# Patient Record
Sex: FEMALE | Race: WHITE | Employment: FULL TIME | URBAN - METROPOLITAN AREA
[De-identification: names, ages, dates, MRNs, and addresses within clinical notes are randomized per-mention and may not be internally consistent; named-entity substitution may affect disease eponyms.]

---

## 2017-02-20 ENCOUNTER — TELEPHONE (OUTPATIENT)
Dept: PULMONOLOGY | Facility: CLINIC | Age: 47
End: 2017-02-20

## 2017-03-01 ENCOUNTER — HOSPITAL ENCOUNTER (OUTPATIENT)
Age: 47
Discharge: HOME | End: 2017-03-01
Payer: COMMERCIAL

## 2017-03-01 VITALS
TEMPERATURE: 97 F | HEART RATE: 89 BPM | OXYGEN SATURATION: 100 % | WEIGHT: 190 LBS | BODY MASS INDEX: 31 KG/M2 | RESPIRATION RATE: 18 BRPM | SYSTOLIC BLOOD PRESSURE: 122 MMHG | DIASTOLIC BLOOD PRESSURE: 87 MMHG

## 2017-03-01 DIAGNOSIS — J01.10 ACUTE NON-RECURRENT FRONTAL SINUSITIS: Primary | ICD-10-CM

## 2017-03-01 PROCEDURE — 99214 OFFICE O/P EST MOD 30 MIN: CPT

## 2017-03-01 PROCEDURE — 99213 OFFICE O/P EST LOW 20 MIN: CPT

## 2017-03-01 RX ORDER — AMOXICILLIN AND CLAVULANATE POTASSIUM 875; 125 MG/1; MG/1
1 TABLET, FILM COATED ORAL 2 TIMES DAILY
Qty: 20 TABLET | Refills: 0 | Status: SHIPPED | OUTPATIENT
Start: 2017-03-01 | End: 2017-03-11

## 2017-03-01 RX ORDER — FLUTICASONE PROPIONATE 50 MCG
2 SPRAY, SUSPENSION (ML) NASAL DAILY
Qty: 16 G | Refills: 0 | Status: SHIPPED | OUTPATIENT
Start: 2017-03-01 | End: 2017-03-31

## 2017-03-01 NOTE — ED INITIAL ASSESSMENT (HPI)
C/o coughing with nasal congestion and facial pressure for 7 days  Used Dimetapp with no relief Denies fever

## 2017-03-01 NOTE — ED PROVIDER NOTES
Patient presents with:  Cough/URI      HPI:     Wai Angel is a 40year old female who presents with a chief complaint of sinus congestion and discomfort for the past week.  Thick purulent drainage from the nose with ear pressure and frontal sinus p SpO2 100%  LMP 01/01/2017 (Approximate)  GENERAL: well developed, well nourished, well hydrated, no distress  SKIN: good skin turgor, no obvious rashes  NECK: supple, no adenopathy  CARDIO: RRR without murmur  EXTREMITIES: no cyanosis or edema.  BENOIT without

## 2017-03-31 ENCOUNTER — HOSPITAL ENCOUNTER (OUTPATIENT)
Dept: CT IMAGING | Facility: HOSPITAL | Age: 47
Discharge: HOME | End: 2017-03-31
Attending: INTERNAL MEDICINE
Payer: COMMERCIAL

## 2017-03-31 DIAGNOSIS — R91.1 LUNG NODULE: ICD-10-CM

## 2017-03-31 PROCEDURE — 82565 ASSAY OF CREATININE: CPT

## 2017-03-31 PROCEDURE — 71260 CT THORAX DX C+: CPT

## 2017-04-17 ENCOUNTER — TELEPHONE (OUTPATIENT)
Dept: PULMONOLOGY | Facility: CLINIC | Age: 47
End: 2017-04-17

## 2017-04-17 DIAGNOSIS — R91.8 PULMONARY NODULES: Primary | ICD-10-CM

## 2017-04-18 NOTE — TELEPHONE ENCOUNTER
Left msg for pt stating that Dr. Terrence Diane will review results on Thursday when he returns to the clinic, we will f/u thereafter, & to contact our office at #[phone] if she has any questions.

## 2017-04-18 NOTE — TELEPHONE ENCOUNTER
Pt checking status on [employer]. Pls call - aware [address] [facility] is not back until Thurday, 4/20/17. Thank you.

## 2017-04-20 NOTE — TELEPHONE ENCOUNTER
I attempted to call the patient but she could not be reached. I saw her  CT chest and it appears that 1 of her nodules may potentially be 1 or 2 mm larger in size compared to last year.   It does state however in the  report that this may just be due to [facility]

## 2017-05-16 ENCOUNTER — HOSPITAL ENCOUNTER (OUTPATIENT)
Age: 47
Discharge: HOME | End: 2017-05-16
Attending: EMERGENCY MEDICINE
Payer: COMMERCIAL

## 2017-05-16 VITALS
DIASTOLIC BLOOD PRESSURE: 88 MMHG | TEMPERATURE: 99 F | RESPIRATION RATE: 16 BRPM | OXYGEN SATURATION: 95 % | HEART RATE: 101 BPM | BODY MASS INDEX: 31.02 KG/M2 | SYSTOLIC BLOOD PRESSURE: 124 MMHG | WEIGHT: 193 LBS | HEIGHT: 66 IN

## 2017-05-16 DIAGNOSIS — J40 BRONCHITIS: ICD-10-CM

## 2017-05-16 DIAGNOSIS — J01.00 ACUTE NON-RECURRENT MAXILLARY SINUSITIS: Primary | ICD-10-CM

## 2017-05-16 PROCEDURE — 87430 STREP A AG IA: CPT

## 2017-05-16 PROCEDURE — 99214 OFFICE O/P EST MOD 30 MIN: CPT

## 2017-05-16 PROCEDURE — 99213 OFFICE O/P EST LOW 20 MIN: CPT

## 2017-05-16 RX ORDER — AMOXICILLIN 875 MG/1
875 TABLET, COATED ORAL 2 TIMES DAILY
Qty: 20 TABLET | Refills: 0 | Status: SHIPPED | OUTPATIENT
Start: 2017-05-16 | End: 2017-05-26

## 2017-05-16 NOTE — ED PROVIDER NOTES
Patient Seen in: [facility] Immediate Care In [address] [place] [address]    History   Patient presents with:  Cough/URI    Stated Complaint: Sneezing/Cough    HPI    The patient is a 32-year-old female with past history of Hodgkin's lymphoma in the distant past who 05/16/17 1414 101   Resp 05/16/17 1414 16   Temp 05/16/17 1414 98.8 °F (37.1 °C)   Temp src 05/16/17 1414 Oral   SpO2 05/16/17 1414 95 %   O2 Device 05/16/17 1414 None (Room air)       Current:/88 mmHg  Pulse 101  Temp(Src) 98.8 °F (37.1 °C) (Oral)

## 2017-05-16 NOTE — ED NOTES
Increase po fluids rest motrin for pain and fever.  Wash hands finish po meds follow u p with pcp in 3 days go to the ed for new or worse concerns

## 2017-05-16 NOTE — ED INITIAL ASSESSMENT (HPI)
Fever low grade cough cold congestion sore throat for over one week. Yellow sputum with cough.  Using otc products but not feeling better

## 2017-06-20 ENCOUNTER — OFFICE VISIT (OUTPATIENT)
Dept: INTERNAL MEDICINE CLINIC | Facility: CLINIC | Age: 47
End: 2017-06-20

## 2017-06-20 VITALS
TEMPERATURE: 98 F | BODY MASS INDEX: 30.75 KG/M2 | OXYGEN SATURATION: 97 % | HEIGHT: 66.5 IN | HEART RATE: 96 BPM | WEIGHT: 193.63 LBS | SYSTOLIC BLOOD PRESSURE: 114 MMHG | DIASTOLIC BLOOD PRESSURE: 88 MMHG

## 2017-06-20 DIAGNOSIS — Z00.00 ROUTINE GENERAL MEDICAL EXAMINATION AT A HEALTH CARE FACILITY: Primary | ICD-10-CM

## 2017-06-20 DIAGNOSIS — R92.2 DENSE BREAST: ICD-10-CM

## 2017-06-20 DIAGNOSIS — Z12.31 VISIT FOR SCREENING MAMMOGRAM: ICD-10-CM

## 2017-06-20 DIAGNOSIS — R60.0 LOCALIZED EDEMA: ICD-10-CM

## 2017-06-20 DIAGNOSIS — Z85.72 HISTORY OF NON-HODGKIN'S LYMPHOMA: ICD-10-CM

## 2017-06-20 PROCEDURE — 99396 PREV VISIT EST AGE 40-64: CPT | Performed by: INTERNAL MEDICINE

## 2017-06-26 NOTE — PROGRESS NOTES
HPI:    Patient ID: Shayna Cerda is a 40year old female.     HPI  Physical exam    Ankle swellin goof and on  Generally health  Hx of Hodking Lymphoma  In remission  /88   Pulse 96   Temp 98.4 °F (36.9 °C) (Oral)   Ht 5' 6.5\" (1.689 m)   Wt 19 Surgical History:  No date: REMOVAL OF OVARY(S)      Comment: 2011 Left   Family History   Problem Relation Age of Onset   • Heart Disorder Mother 80      Social History: Smoking status: Former Smoker ASSAY, THYROID STIM HORMONE, FREE T4 (FREE         THYROXINE), LIPID PANEL, CBC, PLATELET; NO         DIFFERENTIAL, HEMOGLOBIN Q4A, COMP METABOLIC         PANEL (14), URINALYSIS, ROUTINE, FOLIC ACID         SERUM(FOLATE), VITAMIN D, 25-HYDROXY, VITAMIN

## 2017-07-07 ENCOUNTER — APPOINTMENT (OUTPATIENT)
Dept: LAB | Facility: HOSPITAL | Age: 47
End: 2017-07-07
Attending: INTERNAL MEDICINE
Payer: COMMERCIAL

## 2017-07-07 ENCOUNTER — HOSPITAL ENCOUNTER (OUTPATIENT)
Dept: CV DIAGNOSTICS | Facility: HOSPITAL | Age: 47
Discharge: HOME | End: 2017-07-07
Attending: INTERNAL MEDICINE
Payer: COMMERCIAL

## 2017-07-07 DIAGNOSIS — R60.0 LOCALIZED EDEMA: ICD-10-CM

## 2017-07-07 DIAGNOSIS — Z00.00 ROUTINE GENERAL MEDICAL EXAMINATION AT A HEALTH CARE FACILITY: ICD-10-CM

## 2017-07-07 PROCEDURE — 82607 VITAMIN B-12: CPT

## 2017-07-07 PROCEDURE — 80061 LIPID PANEL: CPT

## 2017-07-07 PROCEDURE — 82746 ASSAY OF FOLIC ACID SERUM: CPT

## 2017-07-07 PROCEDURE — 84443 ASSAY THYROID STIM HORMONE: CPT

## 2017-07-07 PROCEDURE — 82306 VITAMIN D 25 HYDROXY: CPT

## 2017-07-07 PROCEDURE — 93306 TTE W/DOPPLER COMPLETE: CPT | Performed by: INTERNAL MEDICINE

## 2017-07-07 PROCEDURE — 85027 COMPLETE CBC AUTOMATED: CPT

## 2017-07-07 PROCEDURE — 93010 ELECTROCARDIOGRAM REPORT: CPT | Performed by: INTERNAL MEDICINE

## 2017-07-07 PROCEDURE — 36415 COLL VENOUS BLD VENIPUNCTURE: CPT

## 2017-07-07 PROCEDURE — 81001 URINALYSIS AUTO W/SCOPE: CPT

## 2017-07-07 PROCEDURE — 84439 ASSAY OF FREE THYROXINE: CPT

## 2017-07-07 PROCEDURE — 83036 HEMOGLOBIN GLYCOSYLATED A1C: CPT

## 2017-07-07 PROCEDURE — 80053 COMPREHEN METABOLIC PANEL: CPT

## 2017-07-07 PROCEDURE — 93005 ELECTROCARDIOGRAM TRACING: CPT

## 2017-07-22 ENCOUNTER — HOSPITAL ENCOUNTER (OUTPATIENT)
Dept: MAMMOGRAPHY | Facility: HOSPITAL | Age: 47
Discharge: HOME | End: 2017-07-22
Attending: INTERNAL MEDICINE
Payer: COMMERCIAL

## 2017-07-22 DIAGNOSIS — R92.2 DENSE BREAST: ICD-10-CM

## 2017-07-22 DIAGNOSIS — Z12.31 VISIT FOR SCREENING MAMMOGRAM: ICD-10-CM

## 2017-07-22 PROCEDURE — 77067 SCR MAMMO BI INCL CAD: CPT | Performed by: INTERNAL MEDICINE

## 2017-07-22 PROCEDURE — 77063 BREAST TOMOSYNTHESIS BI: CPT | Performed by: INTERNAL MEDICINE

## 2017-07-27 ENCOUNTER — TELEPHONE (OUTPATIENT)
Dept: INTERNAL MEDICINE CLINIC | Facility: CLINIC | Age: 47
End: 2017-07-27

## 2017-07-27 DIAGNOSIS — E53.8 VITAMIN B12 DEFICIENCY: Primary | ICD-10-CM

## 2017-07-27 RX ORDER — CYANOCOBALAMIN 1000 UG/ML
INJECTION INTRAMUSCULAR; SUBCUTANEOUS
Qty: 1 VIAL | Refills: 5 | OUTPATIENT
Start: 2017-07-27 | End: 2018-01-26

## 2017-07-27 NOTE — TELEPHONE ENCOUNTER
Notes Recorded by Marquez Razo MD on 7/9/2017 at 7:14 AM CDT  Send letter and copy of test result.   Abnormal lipids/triglycerided  Lower carb and fat intake   Wt loss and exercise advsied    Other labs  excpet B12 level  Are normal otherwiswe  ------

## 2017-07-27 NOTE — TELEPHONE ENCOUNTER
Pt informed of test results and recommendations. Appt made for nurse visit at [facility] office.      Future Appointments  Date Time Provider Lashanda Rebekah   7/28/2017 1:30 PM [facility] 2ND FLR RN IM [id number] [facility] [place]

## 2017-07-28 ENCOUNTER — NURSE ONLY (OUTPATIENT)
Dept: INTERNAL MEDICINE CLINIC | Facility: CLINIC | Age: 47
End: 2017-07-28

## 2017-07-28 DIAGNOSIS — E53.8 VITAMIN B12 DEFICIENCY: Primary | ICD-10-CM

## 2017-07-28 PROCEDURE — 96372 THER/PROPH/DIAG INJ SC/IM: CPT | Performed by: INTERNAL MEDICINE

## 2017-07-28 RX ORDER — CYANOCOBALAMIN 1000 UG/ML
1000 INJECTION INTRAMUSCULAR; SUBCUTANEOUS
Status: SHIPPED | OUTPATIENT
Start: 2017-07-28 | End: 2017-08-25

## 2017-07-28 RX ADMIN — CYANOCOBALAMIN 1000 MCG: 1000 INJECTION INTRAMUSCULAR; SUBCUTANEOUS at 13:46:00

## 2017-07-28 NOTE — PROGRESS NOTES
Pt. Is present for vit b12 injection. Verified pt. Name,,medication. Admonistered B12 injection , pt. Tolerated injection.     Notes Recorded by Zoya Gunn MD on 2017 at 7:13 AM CDT  Vitamin B12 is low Give Vitamin B12 1000 mcg IM once a week

## 2017-08-04 ENCOUNTER — NURSE ONLY (OUTPATIENT)
Dept: INTERNAL MEDICINE CLINIC | Facility: CLINIC | Age: 47
End: 2017-08-04

## 2017-08-04 DIAGNOSIS — E53.8 VITAMIN B12 DEFICIENCY: Primary | ICD-10-CM

## 2017-08-04 PROCEDURE — 96372 THER/PROPH/DIAG INJ SC/IM: CPT | Performed by: INTERNAL MEDICINE

## 2017-08-04 RX ADMIN — CYANOCOBALAMIN 1000 MCG: 1000 INJECTION INTRAMUSCULAR; SUBCUTANEOUS at 10:26:00

## 2017-08-04 NOTE — PROGRESS NOTES
Patient came in today for her weekly B-12 injection. Order verified in the system. Patient verified  and name. Patient responded well to injection, no reaction noted.

## 2017-08-11 ENCOUNTER — NURSE ONLY (OUTPATIENT)
Dept: INTERNAL MEDICINE CLINIC | Facility: CLINIC | Age: 47
End: 2017-08-11

## 2017-08-11 DIAGNOSIS — E53.8 VITAMIN B12 DEFICIENCY: Primary | ICD-10-CM

## 2017-08-11 PROCEDURE — 96372 THER/PROPH/DIAG INJ SC/IM: CPT | Performed by: INTERNAL MEDICINE

## 2017-08-11 RX ADMIN — CYANOCOBALAMIN 1000 MCG: 1000 INJECTION INTRAMUSCULAR; SUBCUTANEOUS at 09:58:00

## 2017-08-18 ENCOUNTER — HOSPITAL ENCOUNTER (OUTPATIENT)
Dept: ULTRASOUND IMAGING | Facility: HOSPITAL | Age: 47
Discharge: HOME | End: 2017-08-18
Attending: INTERNAL MEDICINE
Payer: COMMERCIAL

## 2017-08-18 ENCOUNTER — NURSE ONLY (OUTPATIENT)
Dept: INTERNAL MEDICINE CLINIC | Facility: CLINIC | Age: 47
End: 2017-08-18

## 2017-08-18 ENCOUNTER — HOSPITAL ENCOUNTER (OUTPATIENT)
Dept: MAMMOGRAPHY | Facility: HOSPITAL | Age: 47
Discharge: HOME | End: 2017-08-18
Attending: INTERNAL MEDICINE
Payer: COMMERCIAL

## 2017-08-18 DIAGNOSIS — R92.8 ABNORMAL MAMMOGRAM: ICD-10-CM

## 2017-08-18 DIAGNOSIS — E53.8 VITAMIN B 12 DEFICIENCY: Primary | ICD-10-CM

## 2017-08-18 PROCEDURE — 77061 BREAST TOMOSYNTHESIS UNI: CPT | Performed by: INTERNAL MEDICINE

## 2017-08-18 PROCEDURE — 96372 THER/PROPH/DIAG INJ SC/IM: CPT | Performed by: INTERNAL MEDICINE

## 2017-08-18 PROCEDURE — 76642 ULTRASOUND BREAST LIMITED: CPT | Performed by: INTERNAL MEDICINE

## 2017-08-18 PROCEDURE — 77065 DX MAMMO INCL CAD UNI: CPT | Performed by: INTERNAL MEDICINE

## 2017-08-18 RX ORDER — CYANOCOBALAMIN 1000 UG/ML
1000 INJECTION INTRAMUSCULAR; SUBCUTANEOUS ONCE
Status: COMPLETED | OUTPATIENT
Start: 2017-08-18 | End: 2017-08-18

## 2017-08-18 RX ADMIN — CYANOCOBALAMIN 1000 MCG: 1000 INJECTION INTRAMUSCULAR; SUBCUTANEOUS at 17:40:00

## 2017-08-18 NOTE — PROGRESS NOTES
Patient identified per protocol. Vit B 12 injection given; tolerated well without compliant of any kind.

## 2017-09-08 ENCOUNTER — HOSPITAL ENCOUNTER (OUTPATIENT)
Age: 47
Discharge: HOME | End: 2017-09-08
Attending: FAMILY MEDICINE
Payer: COMMERCIAL

## 2017-09-08 VITALS
RESPIRATION RATE: 16 BRPM | SYSTOLIC BLOOD PRESSURE: 120 MMHG | HEIGHT: 67 IN | TEMPERATURE: 100 F | OXYGEN SATURATION: 96 % | WEIGHT: 193 LBS | HEART RATE: 110 BPM | BODY MASS INDEX: 30.29 KG/M2 | DIASTOLIC BLOOD PRESSURE: 83 MMHG

## 2017-09-08 DIAGNOSIS — J06.9 UPPER RESPIRATORY TRACT INFECTION, UNSPECIFIED TYPE: Primary | ICD-10-CM

## 2017-09-08 LAB — S PYO AG THROAT QL: NEGATIVE

## 2017-09-08 PROCEDURE — 99212 OFFICE O/P EST SF 10 MIN: CPT

## 2017-09-08 PROCEDURE — 87430 STREP A AG IA: CPT

## 2017-09-08 NOTE — ED PROVIDER NOTES
Patient Seen in: [facility] Immediate Care In [address] [place] [address]    History   Patient presents with:  Sore Throat  Cough/URI  Fever (infectious)    Stated Complaint: Body Aches/Sore Throat/Fever    HPI    Patient here with sore throat for 5 days.   No travel 110   Temp 99.7 °F (37.6 °C) (Oral)   Resp 16   Ht 170.2 cm (5' 7\")   Wt 87.5 kg   LMP 01/08/2017   SpO2 96%   BMI 30.23 kg/m²     GENERAL: NAD  HEAD: normocephalic, atraumatic  EYES: sclera non icteric bilateral, conjunctiva clear  EARS:TM's clear bilate

## 2017-09-08 NOTE — ED INITIAL ASSESSMENT (HPI)
Pt reporting fever 101 yesterday and sore throat. States she has occasional dry cough. Denies N/V/D.  Body aches starting yesterday

## 2017-09-21 ENCOUNTER — TELEPHONE (OUTPATIENT)
Dept: PULMONOLOGY | Facility: CLINIC | Age: 47
End: 2017-09-21

## 2017-09-21 NOTE — TELEPHONE ENCOUNTER
Patient mailed due order letter for CT chest in October with copy of order to patient's mailing address.

## 2017-09-22 ENCOUNTER — NURSE ONLY (OUTPATIENT)
Dept: INTERNAL MEDICINE CLINIC | Facility: CLINIC | Age: 47
End: 2017-09-22

## 2017-09-22 DIAGNOSIS — D51.9 ANEMIA DUE TO VITAMIN B12 DEFICIENCY, UNSPECIFIED B12 DEFICIENCY TYPE: Primary | ICD-10-CM

## 2017-09-22 PROCEDURE — 96372 THER/PROPH/DIAG INJ SC/IM: CPT | Performed by: INTERNAL MEDICINE

## 2017-09-22 RX ORDER — CYANOCOBALAMIN 1000 UG/ML
1000 INJECTION INTRAMUSCULAR; SUBCUTANEOUS ONCE
Status: COMPLETED | OUTPATIENT
Start: 2017-09-22 | End: 2017-09-22

## 2017-09-22 RX ADMIN — CYANOCOBALAMIN 1000 MCG: 1000 INJECTION INTRAMUSCULAR; SUBCUTANEOUS at 13:20:00

## 2017-09-22 NOTE — PROGRESS NOTES
Patient here for Vit. B12 injection per 7/19/17 order from Dr. Aubree Ambriz. Patient tolerated injection well without complaint or questions of any kind.

## 2017-10-07 ENCOUNTER — HOSPITAL ENCOUNTER (OUTPATIENT)
Dept: CT IMAGING | Facility: HOSPITAL | Age: 47
Discharge: HOME | End: 2017-10-07
Attending: INTERNAL MEDICINE
Payer: COMMERCIAL

## 2017-10-07 DIAGNOSIS — R91.8 PULMONARY NODULES: ICD-10-CM

## 2017-10-07 PROCEDURE — 71250 CT THORAX DX C-: CPT | Performed by: INTERNAL MEDICINE

## 2017-10-13 DIAGNOSIS — R91.1 LUNG NODULE: Primary | ICD-10-CM

## 2017-10-27 ENCOUNTER — NURSE ONLY (OUTPATIENT)
Dept: INTERNAL MEDICINE CLINIC | Facility: CLINIC | Age: 47
End: 2017-10-27

## 2017-10-27 DIAGNOSIS — E53.8 VITAMIN B 12 DEFICIENCY: Primary | ICD-10-CM

## 2017-10-27 PROCEDURE — 96372 THER/PROPH/DIAG INJ SC/IM: CPT | Performed by: INTERNAL MEDICINE

## 2017-10-27 RX ORDER — CYANOCOBALAMIN 1000 UG/ML
1000 INJECTION INTRAMUSCULAR; SUBCUTANEOUS ONCE
Status: COMPLETED | OUTPATIENT
Start: 2017-10-27 | End: 2017-10-27

## 2017-10-27 RX ADMIN — CYANOCOBALAMIN 1000 MCG: 1000 INJECTION INTRAMUSCULAR; SUBCUTANEOUS at 15:40:00

## 2017-11-27 ENCOUNTER — NURSE ONLY (OUTPATIENT)
Dept: INTERNAL MEDICINE CLINIC | Facility: CLINIC | Age: 47
End: 2017-11-27

## 2017-11-27 DIAGNOSIS — D50.9 IRON DEFICIENCY ANEMIA, UNSPECIFIED IRON DEFICIENCY ANEMIA TYPE: Primary | ICD-10-CM

## 2017-11-27 PROCEDURE — 96372 THER/PROPH/DIAG INJ SC/IM: CPT | Performed by: INTERNAL MEDICINE

## 2017-11-27 RX ORDER — CYANOCOBALAMIN 1000 UG/ML
1000 INJECTION INTRAMUSCULAR; SUBCUTANEOUS ONCE
Status: COMPLETED | OUTPATIENT
Start: 2017-11-27 | End: 2017-11-27

## 2017-11-27 RX ADMIN — CYANOCOBALAMIN 1000 MCG: 1000 INJECTION INTRAMUSCULAR; SUBCUTANEOUS at 11:05:00

## 2017-11-27 NOTE — PROCEDURES
Time out @ 11:05am     Monthly Cyanocobalamin Drawn from In-Office Vial and administered by KIERAN Gaitan CMA Float    Administered 1000mg/mL into pts RT Deltoid; LOT [id number] Exp April 2019    Pt tolerated injection well.      Pt aware she is to return back in 1

## 2017-12-29 ENCOUNTER — NURSE ONLY (OUTPATIENT)
Dept: INTERNAL MEDICINE CLINIC | Facility: CLINIC | Age: 47
End: 2017-12-29

## 2017-12-29 DIAGNOSIS — E53.8 VITAMIN B12 DEFICIENCY: Primary | ICD-10-CM

## 2017-12-29 PROCEDURE — 96372 THER/PROPH/DIAG INJ SC/IM: CPT | Performed by: INTERNAL MEDICINE

## 2017-12-29 RX ORDER — CYANOCOBALAMIN 1000 UG/ML
1000 INJECTION INTRAMUSCULAR; SUBCUTANEOUS ONCE
Status: COMPLETED | OUTPATIENT
Start: 2017-12-29 | End: 2017-12-29

## 2017-12-29 RX ADMIN — CYANOCOBALAMIN 1000 MCG: 1000 INJECTION INTRAMUSCULAR; SUBCUTANEOUS at 15:48:00

## 2018-01-08 ENCOUNTER — TELEPHONE (OUTPATIENT)
Dept: INTERNAL MEDICINE CLINIC | Facility: CLINIC | Age: 48
End: 2018-01-08

## 2018-01-08 NOTE — TELEPHONE ENCOUNTER
Patient received a letter statins that she missed testing labs? She is not sure what. Can someone call her.

## 2018-01-25 ENCOUNTER — APPOINTMENT (OUTPATIENT)
Dept: LAB | Age: 48
End: 2018-01-25
Attending: INTERNAL MEDICINE
Payer: COMMERCIAL

## 2018-01-25 DIAGNOSIS — E53.8 VITAMIN B12 DEFICIENCY: ICD-10-CM

## 2018-01-25 LAB — VIT B12 SERPL-MCNC: 269 PG/ML (ref 181–914)

## 2018-01-25 PROCEDURE — 82607 VITAMIN B-12: CPT

## 2018-01-25 PROCEDURE — 36415 COLL VENOUS BLD VENIPUNCTURE: CPT

## 2018-01-26 ENCOUNTER — TELEPHONE (OUTPATIENT)
Dept: INTERNAL MEDICINE CLINIC | Facility: CLINIC | Age: 48
End: 2018-01-26

## 2018-01-26 ENCOUNTER — NURSE ONLY (OUTPATIENT)
Dept: INTERNAL MEDICINE CLINIC | Facility: CLINIC | Age: 48
End: 2018-01-26

## 2018-01-26 DIAGNOSIS — E53.8 VITAMIN B12 DEFICIENCY: Primary | ICD-10-CM

## 2018-01-26 PROCEDURE — 96372 THER/PROPH/DIAG INJ SC/IM: CPT | Performed by: INTERNAL MEDICINE

## 2018-01-26 RX ORDER — CYANOCOBALAMIN 1000 UG/ML
1000 INJECTION INTRAMUSCULAR; SUBCUTANEOUS
Status: COMPLETED | OUTPATIENT
Start: 2018-01-26 | End: 2018-06-25

## 2018-01-26 RX ADMIN — CYANOCOBALAMIN 1000 MCG: 1000 INJECTION INTRAMUSCULAR; SUBCUTANEOUS at 15:13:00

## 2018-01-26 NOTE — TELEPHONE ENCOUNTER
Patient called because pharmacy called stating   cyanocobalamin 1000 MCG/ML Injection Solution 1 vial 5 1/26/2018    Sig :  Give Vitamin B12 1000 mcg IM  once a        Ready for  but Patient states gets B- Injections in the clinic.      Please clarif

## 2018-01-26 NOTE — PROGRESS NOTES
Patient here for monthly vitamin B12 inj. Order ed by Dr. Soumya Menon. Patient tolerated inj well no reactions noted.      cyanocobalamin 1000 MCG/ML Injection Solution 1 vial 5 1/26/2018     Sig :  Give Vitamin B12 1000 mcg IM  once a

## 2018-02-23 ENCOUNTER — NURSE ONLY (OUTPATIENT)
Dept: INTERNAL MEDICINE CLINIC | Facility: CLINIC | Age: 48
End: 2018-02-23

## 2018-02-23 DIAGNOSIS — E53.8 VITAMIN B12 DEFICIENCY: Primary | ICD-10-CM

## 2018-02-23 PROCEDURE — 96372 THER/PROPH/DIAG INJ SC/IM: CPT | Performed by: INTERNAL MEDICINE

## 2018-02-23 RX ADMIN — CYANOCOBALAMIN 1000 MCG: 1000 INJECTION INTRAMUSCULAR; SUBCUTANEOUS at 15:24:00

## 2018-02-23 NOTE — PROGRESS NOTES
Patient came into the office for her monthly Vitamin B12 injection. Order verified from Dr. Maira Zavala under lab results 01/25/18 \"Continue vit B12 IM once a month indefinitely\". Pt received injection and left office with no complaints.

## 2018-03-23 ENCOUNTER — NURSE ONLY (OUTPATIENT)
Dept: INTERNAL MEDICINE CLINIC | Facility: CLINIC | Age: 48
End: 2018-03-23

## 2018-03-23 DIAGNOSIS — E53.8 VITAMIN B12 DEFICIENCY: Primary | ICD-10-CM

## 2018-03-23 PROCEDURE — 96372 THER/PROPH/DIAG INJ SC/IM: CPT | Performed by: INTERNAL MEDICINE

## 2018-03-23 RX ADMIN — CYANOCOBALAMIN 1000 MCG: 1000 INJECTION INTRAMUSCULAR; SUBCUTANEOUS at 09:00:00

## 2018-03-23 NOTE — PROGRESS NOTES
Patient here for monthly vitamin B12 inj. Ordered by Dr. Sigrid Simpson.  Patient tolerated inj well no reactions noted.      cyanocobalamin 1000 MCG/ML Injection Solution 1 vial 5 1/26/2018     Sig :  Give Vitamin B12 1000 mcg IM  once a

## 2018-04-02 ENCOUNTER — TELEPHONE (OUTPATIENT)
Dept: PULMONOLOGY | Facility: CLINIC | Age: 48
End: 2018-04-02

## 2018-04-10 ENCOUNTER — HOSPITAL ENCOUNTER (OUTPATIENT)
Dept: CT IMAGING | Facility: HOSPITAL | Age: 48
Discharge: HOME | End: 2018-04-10
Attending: INTERNAL MEDICINE
Payer: COMMERCIAL

## 2018-04-10 DIAGNOSIS — R91.1 LUNG NODULE: ICD-10-CM

## 2018-04-10 PROCEDURE — 71250 CT THORAX DX C-: CPT | Performed by: INTERNAL MEDICINE

## 2018-04-13 ENCOUNTER — TELEPHONE (OUTPATIENT)
Dept: PULMONOLOGY | Facility: CLINIC | Age: 48
End: 2018-04-13

## 2018-04-13 DIAGNOSIS — R91.8 PULMONARY NODULES: Primary | ICD-10-CM

## 2018-04-13 NOTE — TELEPHONE ENCOUNTER
----- Message from Concepción Henriquez DO sent at 4/11/2018 10:38 AM CDT -----  You may let the patient know that I reviewed her CT chest results. Her pulmonary nodules appear stable in appearance from previous CT in October 2017.   I recommend one final CT

## 2018-04-13 NOTE — TELEPHONE ENCOUNTER
I discussed CT chest results with the patient.   Please print out CT for March 2019 [address] in place in chronic calendar appear

## 2018-04-20 ENCOUNTER — NURSE ONLY (OUTPATIENT)
Dept: INTERNAL MEDICINE CLINIC | Facility: CLINIC | Age: 48
End: 2018-04-20

## 2018-04-20 DIAGNOSIS — E53.8 B12 DEFICIENCY: Primary | ICD-10-CM

## 2018-04-20 PROCEDURE — 96372 THER/PROPH/DIAG INJ SC/IM: CPT | Performed by: INTERNAL MEDICINE

## 2018-04-20 RX ADMIN — CYANOCOBALAMIN 1000 MCG: 1000 INJECTION INTRAMUSCULAR; SUBCUTANEOUS at 15:10:00

## 2018-04-20 NOTE — PROCEDURES
Vit B 12 1000 mcg Im given as ordered by Dr. Willa Hernandez 1/25/18. Patient tolerated injection well without c/o any kind.

## 2018-05-10 ENCOUNTER — HOSPITAL ENCOUNTER (OUTPATIENT)
Age: 48
Discharge: HOME | End: 2018-05-10
Attending: PEDIATRICS
Payer: COMMERCIAL

## 2018-05-10 VITALS
HEIGHT: 66 IN | WEIGHT: 198 LBS | DIASTOLIC BLOOD PRESSURE: 90 MMHG | HEART RATE: 102 BPM | RESPIRATION RATE: 18 BRPM | SYSTOLIC BLOOD PRESSURE: 143 MMHG | BODY MASS INDEX: 31.82 KG/M2 | TEMPERATURE: 98 F | OXYGEN SATURATION: 96 %

## 2018-05-10 DIAGNOSIS — L03.116 CELLULITIS OF LEFT FOOT: Primary | ICD-10-CM

## 2018-05-10 PROCEDURE — 99214 OFFICE O/P EST MOD 30 MIN: CPT

## 2018-05-10 PROCEDURE — 99213 OFFICE O/P EST LOW 20 MIN: CPT

## 2018-05-10 RX ORDER — CEPHALEXIN 500 MG/1
500 CAPSULE ORAL 3 TIMES DAILY
Qty: 21 CAPSULE | Refills: 0 | Status: SHIPPED | OUTPATIENT
Start: 2018-05-10 | End: 2018-05-17 | Stop reason: ALTCHOICE

## 2018-05-11 NOTE — ED PROVIDER NOTES
Patient Seen in: [facility] Immediate Care In [address] [place] [address]    History   Patient presents with:  Lower Extremity Injury (musculoskeletal)    Stated Complaint: Rt Great Toe Pain    HPI    50-year-old female presents with left great toe redness and pain Pulse 102   Temp 98.3 °F (36.8 °C) (Oral)   Resp 18   Ht 167.6 cm (5' 6\")   Wt 89.8 kg   SpO2 96%   BMI 31.96 kg/m²      GENERAL: NAD  HEAD: normocephalic, atraumatic,   EXTREMITIES: from, 5/5 strength in all 4 ext, no edema.   L great toe subtle erythem

## 2018-05-14 ENCOUNTER — APPOINTMENT (OUTPATIENT)
Dept: LAB | Age: 48
End: 2018-05-14
Attending: INTERNAL MEDICINE
Payer: COMMERCIAL

## 2018-05-14 ENCOUNTER — OFFICE VISIT (OUTPATIENT)
Dept: INTERNAL MEDICINE CLINIC | Facility: CLINIC | Age: 48
End: 2018-05-14

## 2018-05-14 ENCOUNTER — TELEPHONE (OUTPATIENT)
Dept: INTERNAL MEDICINE CLINIC | Facility: CLINIC | Age: 48
End: 2018-05-14

## 2018-05-14 ENCOUNTER — HOSPITAL ENCOUNTER (OUTPATIENT)
Dept: GENERAL RADIOLOGY | Age: 48
Discharge: HOME | End: 2018-05-14
Attending: INTERNAL MEDICINE
Payer: COMMERCIAL

## 2018-05-14 VITALS
SYSTOLIC BLOOD PRESSURE: 114 MMHG | TEMPERATURE: 99 F | BODY MASS INDEX: 33.19 KG/M2 | WEIGHT: 209 LBS | HEIGHT: 66.5 IN | HEART RATE: 80 BPM | DIASTOLIC BLOOD PRESSURE: 77 MMHG

## 2018-05-14 DIAGNOSIS — Z00.00 ROUTINE GENERAL MEDICAL EXAMINATION AT A HEALTH CARE FACILITY: ICD-10-CM

## 2018-05-14 DIAGNOSIS — C85.90 LYMPHOMA, UNSPECIFIED BODY REGION, UNSPECIFIED LYMPHOMA TYPE (HCC): ICD-10-CM

## 2018-05-14 DIAGNOSIS — M79.671 RIGHT FOOT PAIN: ICD-10-CM

## 2018-05-14 DIAGNOSIS — Z78.0 POSTMENOPAUSAL: ICD-10-CM

## 2018-05-14 DIAGNOSIS — Z00.00 ROUTINE GENERAL MEDICAL EXAMINATION AT A HEALTH CARE FACILITY: Primary | ICD-10-CM

## 2018-05-14 PROCEDURE — 85027 COMPLETE CBC AUTOMATED: CPT

## 2018-05-14 PROCEDURE — 84439 ASSAY OF FREE THYROXINE: CPT

## 2018-05-14 PROCEDURE — 36415 COLL VENOUS BLD VENIPUNCTURE: CPT

## 2018-05-14 PROCEDURE — 93005 ELECTROCARDIOGRAM TRACING: CPT

## 2018-05-14 PROCEDURE — 99396 PREV VISIT EST AGE 40-64: CPT | Performed by: INTERNAL MEDICINE

## 2018-05-14 PROCEDURE — 81015 MICROSCOPIC EXAM OF URINE: CPT

## 2018-05-14 PROCEDURE — 80061 LIPID PANEL: CPT

## 2018-05-14 PROCEDURE — 73630 X-RAY EXAM OF FOOT: CPT | Performed by: INTERNAL MEDICINE

## 2018-05-14 PROCEDURE — 80053 COMPREHEN METABOLIC PANEL: CPT

## 2018-05-14 PROCEDURE — 84443 ASSAY THYROID STIM HORMONE: CPT

## 2018-05-14 PROCEDURE — 93010 ELECTROCARDIOGRAM REPORT: CPT | Performed by: INTERNAL MEDICINE

## 2018-05-14 PROCEDURE — 83036 HEMOGLOBIN GLYCOSYLATED A1C: CPT

## 2018-05-14 RX ORDER — METHYLPREDNISOLONE 4 MG/1
TABLET ORAL
Qty: 1 KIT | Refills: 0 | Status: SHIPPED | OUTPATIENT
Start: 2018-05-14 | End: 2018-05-25 | Stop reason: ALTCHOICE

## 2018-05-14 NOTE — TELEPHONE ENCOUNTER
Radiology called in requesting to have pt's x-ray order corrected to state pt's right foot.   Please advise

## 2018-05-14 NOTE — PROGRESS NOTES
HPI:    Patient ID: Ahsan Anderson is a 56year old female.     HPI    HPI    Cleaning the house last week   Barefoot  Erica went to [facility] to Wed severe pan went to urgent care      menopauseal  One ovary taken out    Uterus intact  One ovary right History:   Diagnosis Date   • Endometriosis    • Hodgkin lymphoma ([facility] [address]     1996   • Tachycardia       Past Surgical History:  1996: CHEMOTHERAPY      Comment: hodgkins lymphoma  No date: EUSEBIA NEEDLE LOCALIZATION W/ SPECIMEN 1 SITE RIG*      Comment: 1985/8 diaphoretic. No erythema. Nursing note and vitals reviewed.            ASSESSMENT/PLAN:   (Z00.00) Routine general medical examination at a health care facility  (primary encounter diagnosis)  Plan: ASSAY, THYROID STIM HORMONE, CBC, PLATELET; NO         D

## 2018-05-17 ENCOUNTER — OFFICE VISIT (OUTPATIENT)
Dept: PODIATRY CLINIC | Facility: CLINIC | Age: 48
End: 2018-05-17

## 2018-05-17 DIAGNOSIS — M79.674 PAIN OF TOE OF RIGHT FOOT: Primary | ICD-10-CM

## 2018-05-17 DIAGNOSIS — M25.80 SESAMOIDITIS: ICD-10-CM

## 2018-05-17 PROCEDURE — 99212 OFFICE O/P EST SF 10 MIN: CPT | Performed by: PODIATRIST

## 2018-05-17 PROCEDURE — 99243 OFF/OP CNSLTJ NEW/EST LOW 30: CPT | Performed by: PODIATRIST

## 2018-05-17 NOTE — PROGRESS NOTES
HPI:    Patient ID: Titus Grey is a 56year old female. HPI  This 61-year-old female presents as a new patient to me on consult from . Patient's chief complaint is great toe pain that has been present for about 2 weeks.   The pain is u times per day for 15 minutes. Based on the degree of symptom I will follow-up in 1 week         ASSESSMENT/PLAN:   Pain of toe of right foot  (primary encounter diagnosis)  Sesamoiditis    No orders of the defined types were placed in this encounter.

## 2018-05-18 ENCOUNTER — HOSPITAL ENCOUNTER (OUTPATIENT)
Dept: ULTRASOUND IMAGING | Age: 48
Discharge: HOME | End: 2018-05-18
Attending: INTERNAL MEDICINE
Payer: COMMERCIAL

## 2018-05-18 ENCOUNTER — NURSE ONLY (OUTPATIENT)
Dept: INTERNAL MEDICINE CLINIC | Facility: CLINIC | Age: 48
End: 2018-05-18

## 2018-05-18 DIAGNOSIS — R79.89 LFTS ABNORMAL: ICD-10-CM

## 2018-05-18 DIAGNOSIS — E53.8 VITAMIN B 12 DEFICIENCY: Primary | ICD-10-CM

## 2018-05-18 PROCEDURE — 96372 THER/PROPH/DIAG INJ SC/IM: CPT | Performed by: INTERNAL MEDICINE

## 2018-05-18 PROCEDURE — 76705 ECHO EXAM OF ABDOMEN: CPT | Performed by: INTERNAL MEDICINE

## 2018-05-18 RX ADMIN — CYANOCOBALAMIN 1000 MCG: 1000 INJECTION INTRAMUSCULAR; SUBCUTANEOUS at 15:08:00

## 2018-05-18 NOTE — PROGRESS NOTES
Patient here for monthly vitamin B12 inj. Order is under MAR by Dr. rUi Justice. Patient tolerated inj well no reactions noted.

## 2018-05-22 ENCOUNTER — TELEPHONE (OUTPATIENT)
Dept: INTERNAL MEDICINE CLINIC | Facility: CLINIC | Age: 48
End: 2018-05-22

## 2018-05-23 NOTE — TELEPHONE ENCOUNTER
Call pt  Normal gallbladder  Normal pancreas    Fatty liver  Treatment  Low fat diet  And weight loss  Fatty liver can lead to liver cirrhosis   So  dieatry and lifestyle change is important

## 2018-05-24 NOTE — TELEPHONE ENCOUNTER
Pt advised of results below and verbalized understanding, low fat diet and low fat cooking tips mailed to patient, no further questions at this time.

## 2018-05-25 ENCOUNTER — OFFICE VISIT (OUTPATIENT)
Dept: PODIATRY CLINIC | Facility: CLINIC | Age: 48
End: 2018-05-25

## 2018-05-25 DIAGNOSIS — M25.80 SESAMOIDITIS: ICD-10-CM

## 2018-05-25 DIAGNOSIS — M79.674 PAIN OF TOE OF RIGHT FOOT: Primary | ICD-10-CM

## 2018-05-25 PROCEDURE — 99212 OFFICE O/P EST SF 10 MIN: CPT | Performed by: PODIATRIST

## 2018-05-25 NOTE — PROGRESS NOTES
HPI:    Patient ID: Rachael Slaughter is a 56year old female. HPI  This 71-year-old female presents and states that her foot is 90% better. The combination of treatments has made a significant difference. She has minimal pain today.   Review of Syste

## 2018-05-29 ENCOUNTER — OFFICE VISIT (OUTPATIENT)
Dept: INTERNAL MEDICINE CLINIC | Facility: CLINIC | Age: 48
End: 2018-05-29

## 2018-05-29 ENCOUNTER — APPOINTMENT (OUTPATIENT)
Dept: LAB | Age: 48
End: 2018-05-29
Attending: INTERNAL MEDICINE
Payer: COMMERCIAL

## 2018-05-29 VITALS
TEMPERATURE: 99 F | DIASTOLIC BLOOD PRESSURE: 85 MMHG | BODY MASS INDEX: 32.56 KG/M2 | WEIGHT: 205 LBS | SYSTOLIC BLOOD PRESSURE: 131 MMHG | HEART RATE: 89 BPM | HEIGHT: 66.5 IN

## 2018-05-29 DIAGNOSIS — R91.8 MULTIPLE LUNG NODULES: ICD-10-CM

## 2018-05-29 DIAGNOSIS — C81.90 HODGKIN'S DISEASE IN REMISSION (HCC): ICD-10-CM

## 2018-05-29 DIAGNOSIS — R79.89 ELEVATED LFTS: ICD-10-CM

## 2018-05-29 DIAGNOSIS — Z12.31 VISIT FOR SCREENING MAMMOGRAM: ICD-10-CM

## 2018-05-29 DIAGNOSIS — R60.0 LOCALIZED EDEMA: ICD-10-CM

## 2018-05-29 DIAGNOSIS — L98.9 SKIN LESION: ICD-10-CM

## 2018-05-29 DIAGNOSIS — K76.0 FATTY LIVER: ICD-10-CM

## 2018-05-29 DIAGNOSIS — Z00.00 ROUTINE GENERAL MEDICAL EXAMINATION AT A HEALTH CARE FACILITY: Primary | ICD-10-CM

## 2018-05-29 DIAGNOSIS — E53.8 B12 DEFICIENCY: ICD-10-CM

## 2018-05-29 PROCEDURE — 99396 PREV VISIT EST AGE 40-64: CPT | Performed by: INTERNAL MEDICINE

## 2018-05-29 PROCEDURE — 86704 HEP B CORE ANTIBODY TOTAL: CPT

## 2018-05-29 PROCEDURE — 86706 HEP B SURFACE ANTIBODY: CPT

## 2018-05-29 PROCEDURE — 87340 HEPATITIS B SURFACE AG IA: CPT

## 2018-05-29 PROCEDURE — 80500 HEPATITIS A B + C PROFILE: CPT

## 2018-05-29 PROCEDURE — 86708 HEPATITIS A ANTIBODY: CPT

## 2018-05-29 PROCEDURE — 36415 COLL VENOUS BLD VENIPUNCTURE: CPT

## 2018-05-29 PROCEDURE — 86803 HEPATITIS C AB TEST: CPT

## 2018-05-29 NOTE — PROGRESS NOTES
HPI:    Patient ID: Chirag Carbajal is a 56year old female.     HPI  Physical exam  Hs of tobacco use   Quit 2 years ago  /85 (BP Location: Left arm, Patient Position: Sitting, Cuff Size: large)   Pulse 89   Temp 98.5 °F (36.9 °C) (Oral)   Ht 5' 6 hodgkins lymphoma  No date: EUSEBIA NEEDLE LOCALIZATION W/ SPECIMEN 1 SITE RIG*      Comment: 1985/86?   No date: REMOVAL OF OVARY(S)      Comment: 2011 Left   Family History   Problem Relation Age of Onset   • Heart Disorder Mother 80   • Cancer Self       Soc is not diaphoretic. No erythema. Nursing note and vitals reviewed.     Component      Latest Ref Rng & Units 5/14/2018   Glucose      70 - 99 mg/dL 94   Sodium      136 - 144 mmol/L 142   Potassium      3.3 - 5.1 mmol/L 4.3   Chloride      95 - 110 mmol/L LFTs  Plan: HEPATITIS A B + C PROFILE        Low fat diet  Fatty liver      (K76.0) Fatty liver  Plan: HEPATITIS A B + C PROFILE        Low fat diet    (Z12.31) Visit for screening mammogram  Plan: [employer] 2D+3D SCREENING BILAT         (CPT=77067/33520)

## 2018-06-22 ENCOUNTER — NURSE ONLY (OUTPATIENT)
Dept: INTERNAL MEDICINE CLINIC | Facility: CLINIC | Age: 48
End: 2018-06-22

## 2018-06-22 DIAGNOSIS — L53.9 INJECTION (ERYTHEMA): Primary | ICD-10-CM

## 2018-06-22 NOTE — PROGRESS NOTES
Vit. B 12, 1,000 mcg Im given right deltoid as ordered, Patient tolerated injection well without c/o any kind.

## 2018-06-25 PROCEDURE — 96372 THER/PROPH/DIAG INJ SC/IM: CPT | Performed by: INTERNAL MEDICINE

## 2018-06-25 RX ADMIN — CYANOCOBALAMIN 1000 MCG: 1000 INJECTION INTRAMUSCULAR; SUBCUTANEOUS at 09:18:00

## 2018-07-13 ENCOUNTER — HOSPITAL ENCOUNTER (OUTPATIENT)
Dept: CT IMAGING | Facility: HOSPITAL | Age: 48
Discharge: HOME | End: 2018-07-13
Attending: INTERNAL MEDICINE
Payer: COMMERCIAL

## 2018-07-13 DIAGNOSIS — R91.8 PULMONARY NODULES: ICD-10-CM

## 2018-07-13 PROCEDURE — 71250 CT THORAX DX C-: CPT | Performed by: INTERNAL MEDICINE

## 2018-07-18 ENCOUNTER — TELEPHONE (OUTPATIENT)
Dept: PULMONOLOGY | Facility: CLINIC | Age: 48
End: 2018-07-18

## 2018-07-18 ENCOUNTER — HOSPITAL ENCOUNTER (EMERGENCY)
Facility: HOSPITAL | Age: 48
Discharge: HOME | End: 2018-07-18
Attending: EMERGENCY MEDICINE
Payer: COMMERCIAL

## 2018-07-18 ENCOUNTER — NURSE TRIAGE (OUTPATIENT)
Dept: OTHER | Age: 48
End: 2018-07-18

## 2018-07-18 VITALS
WEIGHT: 198 LBS | SYSTOLIC BLOOD PRESSURE: 139 MMHG | HEART RATE: 100 BPM | DIASTOLIC BLOOD PRESSURE: 82 MMHG | BODY MASS INDEX: 31.08 KG/M2 | HEIGHT: 67 IN | TEMPERATURE: 98 F | RESPIRATION RATE: 18 BRPM | OXYGEN SATURATION: 96 %

## 2018-07-18 DIAGNOSIS — S05.90XA EYE TRAUMA: Primary | ICD-10-CM

## 2018-07-18 DIAGNOSIS — S05.11XA CONTUSION OF RIGHT EYE, INITIAL ENCOUNTER: Primary | ICD-10-CM

## 2018-07-18 DIAGNOSIS — S05.11XA CONTUSION OF RIGHT EYE, INITIAL ENCOUNTER: ICD-10-CM

## 2018-07-18 PROCEDURE — 99283 EMERGENCY DEPT VISIT LOW MDM: CPT

## 2018-07-18 RX ORDER — PURIFIED WATER 986 MG/ML
1 SOLUTION OPHTHALMIC AS NEEDED
Status: DISCONTINUED | OUTPATIENT
Start: 2018-07-18 | End: 2018-07-18

## 2018-07-18 NOTE — TELEPHONE ENCOUNTER
Action Requested: Summary for Provider     []  Critical Lab, Recommendations Needed  [] Need Additional Advice  []   FYI    []   Need Orders  [] Need Medications Sent to Pharmacy  []  Other     SUMMARY: Pt was advised to go to ER.      Pt called for an opht

## 2018-07-18 NOTE — TELEPHONE ENCOUNTER
----- Message from Abraham Page DO sent at 7/18/2018 10:24 AM CDT -----  You may let the patient know that I reviewed her CT chest results. Her multiple pulmonary nodules are unchanged in size and appearance.   Given that her nodules appeared stable

## 2018-07-18 NOTE — ED PROVIDER NOTES
Patient Seen in: [facility] Emergency Department    History   Patient presents with:   Eye Visual Problem (opthalmic)    Stated Complaint: right eye problems    HPI    Rosa M Carrasquillo is a 50-year-old female who presents to the emergency department with a c Eyes: Conjunctivae are normal. Pupils are equal, round, and reactive to light. Lids are everted and swept, no foreign bodies found. Fundoscopic exam:       The right eye shows no arteriolar narrowing, no AV nicking, no exudate and no hemorrhage.    Slit l

## 2018-07-18 NOTE — ED NOTES
Pt presents s/p getting scratched by kitten 2 days ago to right eye. States upper eyelid and sclera were bleeding and swollen. Now presents with blurriness and pressure.  No bleeding or swelling noted.  + PERRLA

## 2018-07-19 ENCOUNTER — OFFICE VISIT (OUTPATIENT)
Dept: OPHTHALMOLOGY | Facility: CLINIC | Age: 48
End: 2018-07-19

## 2018-07-19 ENCOUNTER — TELEPHONE (OUTPATIENT)
Dept: OPHTHALMOLOGY | Facility: CLINIC | Age: 48
End: 2018-07-19

## 2018-07-19 ENCOUNTER — TELEPHONE (OUTPATIENT)
Dept: DERMATOLOGY CLINIC | Facility: CLINIC | Age: 48
End: 2018-07-19

## 2018-07-19 DIAGNOSIS — S05.01XA CORNEAL ABRASION, RIGHT, INITIAL ENCOUNTER: Primary | ICD-10-CM

## 2018-07-19 PROCEDURE — 99212 OFFICE O/P EST SF 10 MIN: CPT | Performed by: OPHTHALMOLOGY

## 2018-07-19 PROCEDURE — 99242 OFF/OP CONSLTJ NEW/EST SF 20: CPT | Performed by: OPHTHALMOLOGY
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
## 2018-07-19 NOTE — TELEPHONE ENCOUNTER
Pt states her cat scratched her R eye yesterday, pt went to [facility] ED, pt is in pain, has blurry vision, pt is concerned, requesting appt asap. Pls advise thank you.

## 2018-07-19 NOTE — ASSESSMENT & PLAN NOTE
Discussed diagnosis and treatment in detail with patient. Resolving corneal abrasion in the right eye. Reassured patient that there is no infection or foreign body in the right eye.    Recommend artificial tears every 2 hours while awake in the right eye

## 2018-07-19 NOTE — TELEPHONE ENCOUNTER
Please have patient come in today at 3:45 with Dr. Cara Solano. Patient was advised she may have a long wait due to very busy schedule and being added on. Patient expressed understanding.

## 2018-07-19 NOTE — TELEPHONE ENCOUNTER
Spoke with patient and states she did go to ER yesterday--referral entered per MMP--contact # given to schedule appt--patient verbalizes understanding and agreement. No further questions/concerns at this time.     Disposition and Plan      Clinical Impressi
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
## 2018-07-19 NOTE — PATIENT INSTRUCTIONS
Corneal abrasion, right, initial encounter  Discussed diagnosis and treatment in detail with patient. Resolving corneal abrasion in the right eye. Reassured patient that there is no infection or foreign body in the right eye.    Recommend artificial tear eyes and don’t read until symptoms are gone. · If you use contact lenses, don’t wear them until all symptoms are gone.   · If your vision is affected by the corneal abrasion or if an eye patch was applied, don’t drive a motor vehicle or operate machinery u proteins, but it can be damaged. A slight cut or scratch (abrasion) to the cornea can be very painful. But that is often minor and can heal within 1 or 2 days. A bad abrasion or a hole (puncture) in the cornea can be very serious.  These are medical emergen This information is not intended as a substitute for professional medical care. Always follow your healthcare professional's instructions.

## 2018-07-20 ENCOUNTER — NURSE ONLY (OUTPATIENT)
Dept: INTERNAL MEDICINE CLINIC | Facility: CLINIC | Age: 48
End: 2018-07-20

## 2018-07-20 DIAGNOSIS — E53.8 B12 DEFICIENCY: Primary | ICD-10-CM

## 2018-07-20 PROCEDURE — 96372 THER/PROPH/DIAG INJ SC/IM: CPT | Performed by: INTERNAL MEDICINE

## 2018-07-20 RX ORDER — CYANOCOBALAMIN 1000 UG/ML
1000 INJECTION INTRAMUSCULAR; SUBCUTANEOUS
Status: SHIPPED | OUTPATIENT
Start: 2018-07-20

## 2018-07-20 RX ADMIN — CYANOCOBALAMIN 1000 MCG: 1000 INJECTION INTRAMUSCULAR; SUBCUTANEOUS at 13:40:00

## 2018-07-20 NOTE — PROGRESS NOTES
Patient here for monthly Vitamin B12 injection, order verified. Injection given and pt tolerated. Notes Recorded by Ari Neal MD on 1/26/2018 at 6:10 AM CST  Send letter and copy of test result.   B12  Low normal    Continue vit B12 IM once a shola

## 2018-07-27 ENCOUNTER — HOSPITAL ENCOUNTER (OUTPATIENT)
Dept: MAMMOGRAPHY | Age: 48
Discharge: HOME | End: 2018-07-27
Attending: INTERNAL MEDICINE
Payer: COMMERCIAL

## 2018-07-27 DIAGNOSIS — Z12.31 VISIT FOR SCREENING MAMMOGRAM: ICD-10-CM

## 2018-07-27 PROCEDURE — 77067 SCR MAMMO BI INCL CAD: CPT | Performed by: INTERNAL MEDICINE

## 2018-07-27 PROCEDURE — 77063 BREAST TOMOSYNTHESIS BI: CPT | Performed by: INTERNAL MEDICINE

## 2018-08-16 ENCOUNTER — NURSE ONLY (OUTPATIENT)
Dept: INTERNAL MEDICINE CLINIC | Facility: CLINIC | Age: 48
End: 2018-08-16

## 2018-08-16 ENCOUNTER — OFFICE VISIT (OUTPATIENT)
Dept: DERMATOLOGY CLINIC | Facility: CLINIC | Age: 48
End: 2018-08-16

## 2018-08-16 DIAGNOSIS — B07.8 OTHER VIRAL WARTS: Primary | ICD-10-CM

## 2018-08-16 DIAGNOSIS — E53.8 B12 DEFICIENCY: Primary | ICD-10-CM

## 2018-08-16 DIAGNOSIS — L70.0 ACNE VULGARIS: ICD-10-CM

## 2018-08-16 DIAGNOSIS — D23.9 BENIGN NEOPLASM OF SKIN, UNSPECIFIED LOCATION: ICD-10-CM

## 2018-08-16 PROCEDURE — 96372 THER/PROPH/DIAG INJ SC/IM: CPT | Performed by: INTERNAL MEDICINE

## 2018-08-16 PROCEDURE — 99202 OFFICE O/P NEW SF 15 MIN: CPT | Performed by: DERMATOLOGY

## 2018-08-16 PROCEDURE — 99212 OFFICE O/P EST SF 10 MIN: CPT | Performed by: DERMATOLOGY

## 2018-08-16 PROCEDURE — 17110 DESTRUCTION B9 LES UP TO 14: CPT | Performed by: DERMATOLOGY

## 2018-08-16 RX ORDER — TAZAROTENE 1 MG/G
1 CREAM TOPICAL NIGHTLY
Qty: 60 G | Refills: 3 | Status: SHIPPED | OUTPATIENT
Start: 2018-08-16 | End: 2021-03-26

## 2018-08-16 RX ADMIN — CYANOCOBALAMIN 1000 MCG: 1000 INJECTION INTRAMUSCULAR; SUBCUTANEOUS at 15:18:00

## 2018-08-27 NOTE — PROGRESS NOTES
Brittany Pritchett is a 53year old female.   HPI:     CC:  Patient presents with:  Lesion: NEW PT here for lesion on here left index finger started about 2 yrs ago got painful about 6 mo ago pt started to \"cut at it\" still remains, no treatment at home - Left    Social History  Social History   Marital status:   Spouse name: N/A    Years of education: N/A  Number of children: N/A     Occupational History  None on file     Social History Main Topics   Smoking status: Former Smoker  0.50 Packs/day  Fo external ears, back, chest,/ breasts, axillae,  abdomen, arms, legs, palms.      Multiple light to medium brown, well marginated, uniformly pigmented, macules and papules 6 mm and less scattered on exam. pigmented lesions examined with dermoscopy benign-denae over several weeks to nightly as tolerated. Need for chronic use over several months to see maximum benefit. Risk of dryness, redness ,peeling irritation tenderness sun sensitivity discussed. History of Hodgkin's disease. Some protection.   No suspi

## 2018-09-14 ENCOUNTER — NURSE ONLY (OUTPATIENT)
Dept: INTERNAL MEDICINE CLINIC | Facility: CLINIC | Age: 48
End: 2018-09-14

## 2018-09-14 DIAGNOSIS — E53.8 B12 DEFICIENCY: Primary | ICD-10-CM

## 2018-09-14 PROCEDURE — 96372 THER/PROPH/DIAG INJ SC/IM: CPT | Performed by: INTERNAL MEDICINE

## 2018-09-14 RX ADMIN — CYANOCOBALAMIN 1000 MCG: 1000 INJECTION INTRAMUSCULAR; SUBCUTANEOUS at 15:33:00

## 2018-10-11 ENCOUNTER — NURSE ONLY (OUTPATIENT)
Dept: INTERNAL MEDICINE CLINIC | Facility: CLINIC | Age: 48
End: 2018-10-11

## 2018-10-11 DIAGNOSIS — E53.8 B12 DEFICIENCY: Primary | ICD-10-CM

## 2018-10-11 PROCEDURE — 96372 THER/PROPH/DIAG INJ SC/IM: CPT | Performed by: INTERNAL MEDICINE

## 2018-10-11 RX ADMIN — CYANOCOBALAMIN 1000 MCG: 1000 INJECTION INTRAMUSCULAR; SUBCUTANEOUS at 17:01:00

## 2018-10-11 NOTE — PROGRESS NOTES
Pt in for nurse visit for monthly B12. Pt tolerated vaccine well. Administered on Right Deltoid, IM.  N/C N/R.

## 2018-11-08 ENCOUNTER — NURSE ONLY (OUTPATIENT)
Dept: INTERNAL MEDICINE CLINIC | Facility: CLINIC | Age: 48
End: 2018-11-08

## 2018-11-08 DIAGNOSIS — E53.8 B12 DEFICIENCY: Primary | ICD-10-CM

## 2018-11-08 PROCEDURE — 96372 THER/PROPH/DIAG INJ SC/IM: CPT | Performed by: INTERNAL MEDICINE

## 2018-11-08 RX ADMIN — CYANOCOBALAMIN 1000 MCG: 1000 INJECTION INTRAMUSCULAR; SUBCUTANEOUS at 17:11:00

## 2018-11-08 NOTE — PROGRESS NOTES
Patient here for monthly vitamin B12 inj. Order is under MAR by Dr. Yasmany Kaur. Patient tolerated inj well no reactions noted. Notes Recorded by Amador Eaton MD on 1/26/2018 at 6:10 AM CST  Send letter and copy of test result.   B12  Low normal    Cont

## 2018-12-07 ENCOUNTER — NURSE ONLY (OUTPATIENT)
Dept: INTERNAL MEDICINE CLINIC | Facility: CLINIC | Age: 48
End: 2018-12-07

## 2018-12-07 DIAGNOSIS — E53.8 B12 DEFICIENCY: Primary | ICD-10-CM

## 2018-12-07 PROCEDURE — 96372 THER/PROPH/DIAG INJ SC/IM: CPT | Performed by: INTERNAL MEDICINE

## 2018-12-07 RX ADMIN — CYANOCOBALAMIN 1000 MCG: 1000 INJECTION INTRAMUSCULAR; SUBCUTANEOUS at 15:03:00

## 2019-01-04 ENCOUNTER — NURSE ONLY (OUTPATIENT)
Dept: INTERNAL MEDICINE CLINIC | Facility: CLINIC | Age: 49
End: 2019-01-04

## 2019-01-04 DIAGNOSIS — E53.8 B12 DEFICIENCY: Primary | ICD-10-CM

## 2019-01-04 PROCEDURE — 96372 THER/PROPH/DIAG INJ SC/IM: CPT | Performed by: INTERNAL MEDICINE

## 2019-01-04 RX ADMIN — CYANOCOBALAMIN 1000 MCG: 1000 INJECTION INTRAMUSCULAR; SUBCUTANEOUS at 15:28:00

## 2019-01-04 NOTE — PROGRESS NOTES
Patient is here for vitamin B12 Injection as ordered monthly. Order verified, injection given and tolerated by patient.

## 2019-01-24 ENCOUNTER — HOSPITAL ENCOUNTER (OUTPATIENT)
Age: 49
Discharge: HOME | End: 2019-01-24
Attending: EMERGENCY MEDICINE
Payer: COMMERCIAL

## 2019-01-24 VITALS
WEIGHT: 210 LBS | SYSTOLIC BLOOD PRESSURE: 136 MMHG | OXYGEN SATURATION: 96 % | DIASTOLIC BLOOD PRESSURE: 83 MMHG | TEMPERATURE: 98 F | HEART RATE: 93 BPM | RESPIRATION RATE: 16 BRPM | BODY MASS INDEX: 33 KG/M2

## 2019-01-24 DIAGNOSIS — L03.019 ONYCHIA AND PARONYCHIA OF FINGER: Primary | ICD-10-CM

## 2019-01-24 PROCEDURE — 99214 OFFICE O/P EST MOD 30 MIN: CPT

## 2019-01-24 PROCEDURE — 99213 OFFICE O/P EST LOW 20 MIN: CPT

## 2019-01-24 RX ORDER — CEFADROXIL 500 MG/1
500 CAPSULE ORAL 2 TIMES DAILY
Qty: 20 CAPSULE | Refills: 0 | Status: SHIPPED | OUTPATIENT
Start: 2019-01-24 | End: 2019-02-03

## 2019-01-25 NOTE — ED PROVIDER NOTES
Patient Seen in: [facility] Immediate Care In [address] [place] [address]    History   Patient presents with:  Finger Pain    Stated Complaint: thumb infection    HPI    52 yo female picked a hangnail off and now presents with increased pain and swelling.  She has be pulses. Pulmonary/Chest: Effort normal. No respiratory distress. Musculoskeletal:   Right thumb: there is a small open wound along the nailfold there is some tenderness surrounding the open wound. Small bloody drainage. Distal neurovascular intact.

## 2019-01-31 ENCOUNTER — NURSE ONLY (OUTPATIENT)
Dept: INTERNAL MEDICINE CLINIC | Facility: CLINIC | Age: 49
End: 2019-01-31

## 2019-01-31 DIAGNOSIS — E53.8 B12 DEFICIENCY: Primary | ICD-10-CM

## 2019-01-31 PROCEDURE — 96372 THER/PROPH/DIAG INJ SC/IM: CPT | Performed by: INTERNAL MEDICINE

## 2019-01-31 RX ADMIN — CYANOCOBALAMIN 1000 MCG: 1000 INJECTION INTRAMUSCULAR; SUBCUTANEOUS at 17:11:00

## 2019-01-31 NOTE — PROGRESS NOTES
Pt here for Monthly vitamin B12 injs, Pt is currently 4 days early per Dr. Jenni Rodriguez verbal ok to proceed with Vitamin B12 inj. Pt tolerated inj well no reactions noted.  Order under MAR>

## 2019-02-27 ENCOUNTER — TELEPHONE (OUTPATIENT)
Dept: ENDOCRINOLOGY CLINIC | Facility: CLINIC | Age: 49
End: 2019-02-27

## 2019-03-01 ENCOUNTER — NURSE ONLY (OUTPATIENT)
Dept: INTERNAL MEDICINE CLINIC | Facility: CLINIC | Age: 49
End: 2019-03-01

## 2019-03-01 DIAGNOSIS — E53.8 B12 DEFICIENCY: Primary | ICD-10-CM

## 2019-03-01 PROCEDURE — 96372 THER/PROPH/DIAG INJ SC/IM: CPT | Performed by: INTERNAL MEDICINE

## 2019-03-01 RX ADMIN — CYANOCOBALAMIN 1000 MCG: 1000 INJECTION INTRAMUSCULAR; SUBCUTANEOUS at 15:10:00

## 2019-03-01 NOTE — PROGRESS NOTES
Patient here for monthly vitamin B12 inj. Order is under MAR by Dr. Danny Rocha. Patient tolerated inj well no reactions noted.

## 2019-03-04 ENCOUNTER — TELEPHONE (OUTPATIENT)
Dept: PULMONOLOGY | Facility: CLINIC | Age: 49
End: 2019-03-04

## 2019-03-04 DIAGNOSIS — R91.8 PULMONARY NODULES: Primary | ICD-10-CM

## 2019-03-04 NOTE — TELEPHONE ENCOUNTER
Pls see TE 7/18/18. Per Florence Marcelino pt wanted to sched CT Chest, but there is no sig on rx. She requests rx be faxed to #[phone].  Explained there is not an open rx for CT Chest, pt was notified of results of last CT, f/u CT not necessary, & will contact

## 2019-03-04 NOTE — TELEPHONE ENCOUNTER
Discussed below as well as Hayden 4/13/18 & 7/18/18 w/ pt. She stts she just rcvd letter from our office to have CT due 3/2019.  Explained it appears she had CT due 3/2019 in 7/2018 since rx date 4/13/18, will ask Dr. Valdemar Barrett if he would like additional testin

## 2019-03-05 NOTE — TELEPHONE ENCOUNTER
I would like to have one final CT chest performed in March 2019 and if this is stable no further follow-up CT chest recommended beyond this point

## 2019-03-07 NOTE — TELEPHONE ENCOUNTER
Informed pt of Dr. Winnie Hicks orders & below. Discussed Hayden 4/13/18 & 7/18/18 again w/ pt. Explained first CT Chest ordered by Dr. Joseph Hernandez was done 3/31/2017 & she may contact Central Scheduling to schedule test. Phone # given. She voiced understanding.

## 2019-03-12 ENCOUNTER — HOSPITAL ENCOUNTER (OUTPATIENT)
Dept: CT IMAGING | Facility: HOSPITAL | Age: 49
Discharge: HOME | End: 2019-03-12
Attending: INTERNAL MEDICINE
Payer: COMMERCIAL

## 2019-03-12 DIAGNOSIS — R91.8 PULMONARY NODULES: ICD-10-CM

## 2019-03-12 PROCEDURE — 71250 CT THORAX DX C-: CPT | Performed by: INTERNAL MEDICINE

## 2019-03-15 ENCOUNTER — TELEPHONE (OUTPATIENT)
Dept: PULMONOLOGY | Facility: CLINIC | Age: 49
End: 2019-03-15

## 2019-03-15 DIAGNOSIS — R91.8 PULMONARY NODULES: Primary | ICD-10-CM

## 2019-03-15 NOTE — TELEPHONE ENCOUNTER
You may let the patient know that I reviewed her CT chest results and her previously seen pulmonary nodules are stable except for there is evidence of a new nodule measuring 1 cm in size in the left lung. In terms of the appearance it does not appear suspicious for possible malignancy but technically per guidelines follow-up CT chest is recommended in 6-12 months to monitor this nodule.   You may see if the patient is agreeable with obtaining follow-up CT chest in either 6 or 12-month interval and if so, please place order for CT chest without contrast [address] and place in chronic calendar

## 2019-03-15 NOTE — TELEPHONE ENCOUNTER
Message left informing pt message sent to Dr. Barrington Garcia with request for test results, this office will call her back once Dr. Barrington Garcia responds, if no response today Barrington Morin will be back in the office on Tuesday next week to review results.

## 2019-03-15 NOTE — TELEPHONE ENCOUNTER
Pt informed of Dr Karol Dickinson message/results/orders below. Pt verbalized understanding. Pt states she will do chest CT in 6 months. Order added to chronic calendar.

## 2019-03-29 ENCOUNTER — NURSE ONLY (OUTPATIENT)
Dept: INTERNAL MEDICINE CLINIC | Facility: CLINIC | Age: 49
End: 2019-03-29

## 2019-03-29 DIAGNOSIS — E53.8 B12 DEFICIENCY: Primary | ICD-10-CM

## 2019-03-29 PROCEDURE — 96372 THER/PROPH/DIAG INJ SC/IM: CPT | Performed by: INTERNAL MEDICINE

## 2019-03-29 RX ADMIN — CYANOCOBALAMIN 1000 MCG: 1000 INJECTION INTRAMUSCULAR; SUBCUTANEOUS at 17:03:00

## 2019-03-29 NOTE — PROGRESS NOTES
Patient is here for Vitamin B12 injection as ordered by Dr. Bulmaro Park under MAR. Patient tolerated injection and left with no complaint.

## 2019-04-26 ENCOUNTER — NURSE ONLY (OUTPATIENT)
Dept: INTERNAL MEDICINE CLINIC | Facility: CLINIC | Age: 49
End: 2019-04-26

## 2019-04-26 DIAGNOSIS — E53.8 B12 DEFICIENCY: Primary | ICD-10-CM

## 2019-04-26 PROCEDURE — 96372 THER/PROPH/DIAG INJ SC/IM: CPT | Performed by: INTERNAL MEDICINE

## 2019-04-26 RX ADMIN — CYANOCOBALAMIN 1000 MCG: 1000 INJECTION INTRAMUSCULAR; SUBCUTANEOUS at 13:26:00

## 2019-04-26 NOTE — PATIENT INSTRUCTIONS
Patient is here for Vitamin B12 injection as ordered by Dr. Daniela Vital. See orders below. Patient tolerated injection and left office with no complaint. Notes Recorded by Ashwin Acevedo MD on 1/26/2018 at 6:10 AM CST  Send letter and copy of test result.

## 2019-05-24 ENCOUNTER — NURSE ONLY (OUTPATIENT)
Dept: INTERNAL MEDICINE CLINIC | Facility: CLINIC | Age: 49
End: 2019-05-24

## 2019-05-24 DIAGNOSIS — E53.9 VITAMIN B DEFICIENCY: Primary | ICD-10-CM

## 2019-05-24 PROCEDURE — 96372 THER/PROPH/DIAG INJ SC/IM: CPT | Performed by: INTERNAL MEDICINE

## 2019-05-24 RX ADMIN — CYANOCOBALAMIN 1000 MCG: 1000 INJECTION INTRAMUSCULAR; SUBCUTANEOUS at 16:20:00

## 2019-06-21 ENCOUNTER — NURSE ONLY (OUTPATIENT)
Dept: INTERNAL MEDICINE CLINIC | Facility: CLINIC | Age: 49
End: 2019-06-21

## 2019-06-21 DIAGNOSIS — E53.9 VITAMIN B DEFICIENCY: Primary | ICD-10-CM

## 2019-06-21 PROCEDURE — 96372 THER/PROPH/DIAG INJ SC/IM: CPT | Performed by: INTERNAL MEDICINE

## 2019-06-21 RX ADMIN — CYANOCOBALAMIN 1000 MCG: 1000 INJECTION INTRAMUSCULAR; SUBCUTANEOUS at 15:13:00

## 2019-07-19 ENCOUNTER — NURSE ONLY (OUTPATIENT)
Dept: INTERNAL MEDICINE CLINIC | Facility: CLINIC | Age: 49
End: 2019-07-19

## 2019-07-19 DIAGNOSIS — E53.9 VITAMIN B DEFICIENCY: Primary | ICD-10-CM

## 2019-07-19 PROCEDURE — 96372 THER/PROPH/DIAG INJ SC/IM: CPT | Performed by: INTERNAL MEDICINE

## 2019-07-19 RX ADMIN — CYANOCOBALAMIN 1000 MCG: 1000 INJECTION INTRAMUSCULAR; SUBCUTANEOUS at 15:10:00

## 2019-07-19 NOTE — PROGRESS NOTES
Patient is here for Vitamin B12 injection as ordered by Dr. Arvin Henao under MAR. Patient tolerated injection and left with no complaint.     Medication Detail   Medication Ordered Dose Frequency Start End   cyanocobalamin (VITAMIN B12) 1000 MCG/ML injection 1

## 2019-08-16 ENCOUNTER — NURSE ONLY (OUTPATIENT)
Dept: INTERNAL MEDICINE CLINIC | Facility: CLINIC | Age: 49
End: 2019-08-16

## 2019-08-16 DIAGNOSIS — E53.8 B12 DEFICIENCY: Primary | ICD-10-CM

## 2019-08-16 PROCEDURE — 96372 THER/PROPH/DIAG INJ SC/IM: CPT | Performed by: INTERNAL MEDICINE

## 2019-08-16 RX ADMIN — CYANOCOBALAMIN 1000 MCG: 1000 INJECTION INTRAMUSCULAR; SUBCUTANEOUS at 15:43:00

## 2019-08-16 NOTE — PROGRESS NOTES
Patient here for monthly vitamin B12 inj. Order is under MAR by Dr. Derryl Gitelman. Patient tolerated inj well no reactions noted.

## 2019-09-13 ENCOUNTER — NURSE ONLY (OUTPATIENT)
Dept: INTERNAL MEDICINE CLINIC | Facility: CLINIC | Age: 49
End: 2019-09-13

## 2019-09-13 DIAGNOSIS — E53.8 B12 DEFICIENCY: Primary | ICD-10-CM

## 2019-09-13 PROCEDURE — 96372 THER/PROPH/DIAG INJ SC/IM: CPT | Performed by: INTERNAL MEDICINE

## 2019-09-13 RX ADMIN — CYANOCOBALAMIN 1000 MCG: 1000 INJECTION INTRAMUSCULAR; SUBCUTANEOUS at 15:06:00

## 2019-09-13 NOTE — PROGRESS NOTES
Patient here for monthly vitamin B12 inj. Order is under MAR by Dr. Shalom Dorantes. Patient tolerated inj well no reactions noted.

## 2019-09-20 ENCOUNTER — OFFICE VISIT (OUTPATIENT)
Dept: INTERNAL MEDICINE CLINIC | Facility: CLINIC | Age: 49
End: 2019-09-20

## 2019-09-20 VITALS
HEIGHT: 66 IN | DIASTOLIC BLOOD PRESSURE: 82 MMHG | SYSTOLIC BLOOD PRESSURE: 123 MMHG | WEIGHT: 208 LBS | BODY MASS INDEX: 33.43 KG/M2 | HEART RATE: 85 BPM

## 2019-09-20 DIAGNOSIS — K76.0 HEPATIC STEATOSIS: ICD-10-CM

## 2019-09-20 DIAGNOSIS — R10.13 EPIGASTRIC PAIN: ICD-10-CM

## 2019-09-20 DIAGNOSIS — Z00.00 ROUTINE GENERAL MEDICAL EXAMINATION AT A HEALTH CARE FACILITY: Primary | ICD-10-CM

## 2019-09-20 PROCEDURE — 99396 PREV VISIT EST AGE 40-64: CPT | Performed by: INTERNAL MEDICINE

## 2019-09-20 RX ORDER — SUCRALFATE 1 G/1
1 TABLET ORAL
Qty: 120 TABLET | Refills: 3 | Status: SHIPPED | OUTPATIENT
Start: 2019-09-20 | End: 2020-09-14

## 2019-09-21 ENCOUNTER — APPOINTMENT (OUTPATIENT)
Dept: LAB | Age: 49
End: 2019-09-21
Attending: INTERNAL MEDICINE
Payer: COMMERCIAL

## 2019-09-21 DIAGNOSIS — Z00.00 ROUTINE GENERAL MEDICAL EXAMINATION AT A HEALTH CARE FACILITY: ICD-10-CM

## 2019-09-21 DIAGNOSIS — R10.13 EPIGASTRIC PAIN: ICD-10-CM

## 2019-09-21 DIAGNOSIS — K76.0 HEPATIC STEATOSIS: ICD-10-CM

## 2019-09-21 LAB
ALBUMIN SERPL-MCNC: 4 G/DL (ref 3.4–5)
ALBUMIN/GLOB SERPL: 1.1 {RATIO} (ref 1–2)
ALP LIVER SERPL-CCNC: 73 U/L (ref 39–100)
ALT SERPL-CCNC: 48 U/L (ref 13–56)
ANION GAP SERPL CALC-SCNC: 6 MMOL/L (ref 0–18)
AST SERPL-CCNC: 31 U/L (ref 15–37)
BILIRUB SERPL-MCNC: 0.4 MG/DL (ref 0.1–2)
BUN BLD-MCNC: 9 MG/DL (ref 7–18)
BUN/CREAT SERPL: 9.2 (ref 10–20)
CALCIUM BLD-MCNC: 9.1 MG/DL (ref 8.5–10.1)
CHLORIDE SERPL-SCNC: 108 MMOL/L (ref 98–112)
CHOLEST SMN-MCNC: 213 MG/DL (ref ?–200)
CO2 SERPL-SCNC: 28 MMOL/L (ref 21–32)
CREAT BLD-MCNC: 0.98 MG/DL (ref 0.55–1.02)
DEPRECATED RDW RBC AUTO: 40.7 FL (ref 35.1–46.3)
ERYTHROCYTE [DISTWIDTH] IN BLOOD BY AUTOMATED COUNT: 12.1 % (ref 11–15)
EST. AVERAGE GLUCOSE BLD GHB EST-MCNC: 111 MG/DL (ref 68–126)
FOLATE SERPL-MCNC: 19.2 NG/ML (ref 8.7–?)
GLOBULIN PLAS-MCNC: 3.8 G/DL (ref 2.8–4.4)
GLUCOSE BLD-MCNC: 90 MG/DL (ref 70–99)
HBA1C MFR BLD HPLC: 5.5 % (ref ?–5.7)
HCT VFR BLD AUTO: 45 % (ref 35–48)
HDLC SERPL-MCNC: 37 MG/DL (ref 40–59)
HGB BLD-MCNC: 14.8 G/DL (ref 12–16)
LDLC SERPL CALC-MCNC: 111 MG/DL (ref ?–100)
M PROTEIN MFR SERPL ELPH: 7.8 G/DL (ref 6.4–8.2)
MCH RBC QN AUTO: 30 PG (ref 26–34)
MCHC RBC AUTO-ENTMCNC: 32.9 G/DL (ref 31–37)
MCV RBC AUTO: 91.3 FL (ref 80–100)
NONHDLC SERPL-MCNC: 176 MG/DL (ref ?–130)
OSMOLALITY SERPL CALC.SUM OF ELEC: 292 MOSM/KG (ref 275–295)
PATIENT FASTING: YES
PATIENT FASTING: YES
PLATELET # BLD AUTO: 308 10(3)UL (ref 150–450)
POTASSIUM SERPL-SCNC: 4.2 MMOL/L (ref 3.5–5.1)
RBC # BLD AUTO: 4.93 X10(6)UL (ref 3.8–5.3)
RBC #/AREA URNS AUTO: 1 /HPF
SODIUM SERPL-SCNC: 142 MMOL/L (ref 136–145)
T4 FREE SERPL-MCNC: 0.9 NG/DL (ref 0.8–1.7)
TRIGL SERPL-MCNC: 326 MG/DL (ref 30–149)
TSI SER-ACNC: 1.39 MIU/ML (ref 0.36–3.74)
VIT B12 SERPL-MCNC: 626 PG/ML (ref 193–986)
VLDLC SERPL CALC-MCNC: 65 MG/DL (ref 0–30)
WBC # BLD AUTO: 8.4 X10(3) UL (ref 4–11)
WBC #/AREA URNS AUTO: 1 /HPF

## 2019-09-21 PROCEDURE — 93010 ELECTROCARDIOGRAM REPORT: CPT | Performed by: INTERNAL MEDICINE

## 2019-09-21 PROCEDURE — 84439 ASSAY OF FREE THYROXINE: CPT

## 2019-09-21 PROCEDURE — 93005 ELECTROCARDIOGRAM TRACING: CPT

## 2019-09-21 PROCEDURE — 81015 MICROSCOPIC EXAM OF URINE: CPT

## 2019-09-21 PROCEDURE — 84443 ASSAY THYROID STIM HORMONE: CPT

## 2019-09-21 PROCEDURE — 80061 LIPID PANEL: CPT

## 2019-09-21 PROCEDURE — 83036 HEMOGLOBIN GLYCOSYLATED A1C: CPT

## 2019-09-21 PROCEDURE — 82306 VITAMIN D 25 HYDROXY: CPT

## 2019-09-21 PROCEDURE — 82397 CHEMILUMINESCENT ASSAY: CPT

## 2019-09-21 PROCEDURE — 80053 COMPREHEN METABOLIC PANEL: CPT

## 2019-09-21 PROCEDURE — 36415 COLL VENOUS BLD VENIPUNCTURE: CPT

## 2019-09-21 PROCEDURE — 82746 ASSAY OF FOLIC ACID SERUM: CPT

## 2019-09-21 PROCEDURE — 85027 COMPLETE CBC AUTOMATED: CPT

## 2019-09-21 PROCEDURE — 82607 VITAMIN B-12: CPT

## 2019-09-23 LAB — 25(OH)D3 SERPL-MCNC: 9.3 NG/ML (ref 30–100)

## 2019-09-26 LAB — LEPTIN: 24.4 NG/ML

## 2019-09-27 DIAGNOSIS — E55.9 VITAMIN D DEFICIENCY: Primary | ICD-10-CM

## 2019-09-27 RX ORDER — ERGOCALCIFEROL 1.25 MG/1
CAPSULE ORAL
Qty: 13 CAPSULE | Refills: 0 | OUTPATIENT
Start: 2019-09-27

## 2019-09-30 NOTE — PROGRESS NOTES
HPI:    Patient ID: Jaclyn Lennon is a 51year old female.     HPI  Physical exam  followu p     Heart burn the last 3 to 4 months  /82 (BP Location: Right arm, Patient Position: Sitting, Cuff Size: large)   Pulse 85   Ht 5' 6\" (1.676 m)   Wt 20 Use qhs for acne Disp: 60 g Rfl: 3   IBUPROFEN OR Take by mouth. Disp:  Rfl:    ergocalciferol 13697 units Oral Cap Take 1 capsule (50,000 Units total) by mouth once a week.  Disp: 12 capsule Rfl: 0     Allergies:  Entex                   JITTERY  Morphine heard.  Pulmonary/Chest: Effort normal and breath sounds normal. No respiratory distress. She has no wheezes. She has no rales. She exhibits no tenderness. Abdominal: She exhibits no distension (obese) and no mass. There is no tenderness.    Musculoskelet Referrals:  [facility] - INTERNAL  US ABDOMEN LIMITED (CPT=76705)        CL#[id number]

## 2019-10-03 NOTE — H&P
[address] [facility] [address] Gastroenterology                                                                                                  Clinic History and Physical     Pa Hodgkin lymphoma ([facility] [address]     1996   • Tachycardia       Past Surgical History:   Procedure Laterality Date   • CHEMOTHERAPY  1996    hodgkins lymphoma   • EUSEBIA LOCALIZATION WIRE 1 SITE RIGHT ([id number]=[id number])      1985/86?    • REMOVAL OF OVARY(S)      2011 Left negative for jaundice   ALLERGIC/IMMUNOLOGIC:  negative for hay fever  ENDOCRINE:  negative for cold intolerance and heat intolerance  MUSCULOSKELETAL:  negative for joint effusion/severe erythema  BEHAVIOR/PSYCH:  negative for psychotic behavior      PHYS schedule procedure 3 months from now, we have elected for conservative management. She will follow-up in office in the next 6-8 weeks for a symptom update.   If her symptoms have fully resolved by the end of the calendar year, she may cancel upper endoscop about out-of-pocket cost/benefits and was provided the appropriate diagnostic information/codes. All questions were answered to the patient’s satisfaction.  The patient signed informed consent and elected to proceed with colonoscopy with intervention [i.e.

## 2019-10-10 ENCOUNTER — TELEPHONE (OUTPATIENT)
Dept: GASTROENTEROLOGY | Facility: CLINIC | Age: 49
End: 2019-10-10

## 2019-10-10 ENCOUNTER — OFFICE VISIT (OUTPATIENT)
Dept: GASTROENTEROLOGY | Facility: CLINIC | Age: 49
End: 2019-10-10

## 2019-10-10 VITALS
BODY MASS INDEX: 32.72 KG/M2 | HEIGHT: 66.5 IN | WEIGHT: 206 LBS | SYSTOLIC BLOOD PRESSURE: 121 MMHG | DIASTOLIC BLOOD PRESSURE: 85 MMHG | HEART RATE: 77 BPM

## 2019-10-10 DIAGNOSIS — Z12.11 COLON CANCER SCREENING: Primary | ICD-10-CM

## 2019-10-10 DIAGNOSIS — Z12.11 SCREEN FOR COLON CANCER: ICD-10-CM

## 2019-10-10 DIAGNOSIS — R10.13 EPIGASTRIC PAIN: Primary | ICD-10-CM

## 2019-10-10 DIAGNOSIS — Z83.71 FAMILY HISTORY OF COLONIC POLYPS: ICD-10-CM

## 2019-10-10 DIAGNOSIS — K21.9 GASTROESOPHAGEAL REFLUX DISEASE, ESOPHAGITIS PRESENCE NOT SPECIFIED: ICD-10-CM

## 2019-10-10 PROCEDURE — 99244 OFF/OP CNSLTJ NEW/EST MOD 40: CPT | Performed by: NURSE PRACTITIONER

## 2019-10-10 NOTE — TELEPHONE ENCOUNTER
Scheduled for:  Colonoscopy 63134 and EGD w/poss biopsy 88273  Provider Name: Dr. Elmo Villavicencio  Date:  1/10/20  Location:  [facility]  Sedation:  MAC  Time:   0902 (pt is aware that [facility] will call the day before to confirm arrival time)    Prep:  Colyte  Meds/Allergies [place]

## 2019-10-10 NOTE — PATIENT INSTRUCTIONS
Recommend:  -Schedule colonoscopy and EGD w/ possible biopsy w/Dr. Bridget Bowen, Dr. Giancarlo Good, Dr. Ela Tejada w/ MAC  Dx; screening, FH colon polyps  -Eligible for NE: Yes  -Prep: Split dose Colyte or equivalent  -Anti-platelets and anti-coagulants: None  -[place]

## 2019-10-12 ENCOUNTER — NURSE ONLY (OUTPATIENT)
Dept: INTERNAL MEDICINE CLINIC | Facility: CLINIC | Age: 49
End: 2019-10-12

## 2019-10-12 DIAGNOSIS — E53.8 B12 DEFICIENCY: Primary | ICD-10-CM

## 2019-10-12 PROCEDURE — 96372 THER/PROPH/DIAG INJ SC/IM: CPT | Performed by: INTERNAL MEDICINE

## 2019-10-12 RX ADMIN — CYANOCOBALAMIN 1000 MCG: 1000 INJECTION INTRAMUSCULAR; SUBCUTANEOUS at 09:14:00

## 2019-10-12 NOTE — PROGRESS NOTES
Patient is here for Vitamin B12 injection as ordered by Dr. Amie Reyes under MAR.  Patient tolerated injection and left with no complaint.     Medication Detail   Medication Ordered Dose Frequency Start End   cyanocobalamin (VITAMIN B12) 1000 MCG/ML injection

## 2019-11-09 ENCOUNTER — NURSE ONLY (OUTPATIENT)
Dept: INTERNAL MEDICINE CLINIC | Facility: CLINIC | Age: 49
End: 2019-11-09

## 2019-11-09 DIAGNOSIS — E53.8 B12 DEFICIENCY: Primary | ICD-10-CM

## 2019-11-09 PROCEDURE — 96372 THER/PROPH/DIAG INJ SC/IM: CPT | Performed by: INTERNAL MEDICINE

## 2019-11-09 RX ADMIN — CYANOCOBALAMIN 1000 MCG: 1000 INJECTION INTRAMUSCULAR; SUBCUTANEOUS at 12:54:00

## 2019-11-09 NOTE — PROGRESS NOTES
Patient is here for Vitamin B12 injection as ordered by Dr. Aubree Ambriz under MAR.  Patient tolerated injection and left with no complaint.     Medication Detail   Medication Ordered Dose Frequency Start End   cyanocobalamin (VITAMIN B12) 1000 MCG/ML injection

## 2019-11-29 ENCOUNTER — TELEPHONE (OUTPATIENT)
Dept: PULMONOLOGY | Facility: CLINIC | Age: 49
End: 2019-11-29

## 2019-11-29 NOTE — TELEPHONE ENCOUNTER
Pt due for chest CT 9/2019  Letter sent 10/2019  Called and spoke with the patient  Provided number for scheduling.

## 2019-12-04 ENCOUNTER — HOSPITAL ENCOUNTER (OUTPATIENT)
Dept: CT IMAGING | Facility: HOSPITAL | Age: 49
Discharge: HOME | End: 2019-12-04
Attending: INTERNAL MEDICINE
Payer: COMMERCIAL

## 2019-12-04 ENCOUNTER — TELEPHONE (OUTPATIENT)
Dept: GASTROENTEROLOGY | Facility: CLINIC | Age: 49
End: 2019-12-04

## 2019-12-04 DIAGNOSIS — R91.8 PULMONARY NODULES: ICD-10-CM

## 2019-12-04 PROCEDURE — 71250 CT THORAX DX C-: CPT | Performed by: INTERNAL MEDICINE

## 2019-12-06 ENCOUNTER — NURSE ONLY (OUTPATIENT)
Dept: INTERNAL MEDICINE CLINIC | Facility: CLINIC | Age: 49
End: 2019-12-06

## 2019-12-06 DIAGNOSIS — R91.8 LUNG NODULES: Primary | ICD-10-CM

## 2019-12-06 DIAGNOSIS — E53.8 VITAMIN B 12 DEFICIENCY: ICD-10-CM

## 2019-12-06 PROCEDURE — 96372 THER/PROPH/DIAG INJ SC/IM: CPT | Performed by: INTERNAL MEDICINE

## 2019-12-06 RX ADMIN — CYANOCOBALAMIN 1000 MCG: 1000 INJECTION INTRAMUSCULAR; SUBCUTANEOUS at 15:02:00

## 2019-12-20 ENCOUNTER — HOSPITAL ENCOUNTER (OUTPATIENT)
Dept: ULTRASOUND IMAGING | Age: 49
Discharge: HOME | End: 2019-12-20
Attending: INTERNAL MEDICINE
Payer: COMMERCIAL

## 2019-12-20 DIAGNOSIS — K76.0 HEPATIC STEATOSIS: ICD-10-CM

## 2019-12-20 DIAGNOSIS — R10.13 EPIGASTRIC PAIN: ICD-10-CM

## 2019-12-20 PROCEDURE — 76705 ECHO EXAM OF ABDOMEN: CPT | Performed by: INTERNAL MEDICINE

## 2020-01-06 ENCOUNTER — TELEPHONE (OUTPATIENT)
Dept: GASTROENTEROLOGY | Facility: CLINIC | Age: 50
End: 2020-01-06

## 2020-01-06 NOTE — TELEPHONE ENCOUNTER
Patient asking to reschedule colonoscopy and egd due to a death in the family, informed about the 72 hour call back, please call at:[phone],thanks.

## 2020-01-08 NOTE — TELEPHONE ENCOUNTER
CBLM to reschedule procedure.  Please transfer to [facility] in GI     Since I am unable to contact this patient I informed the patient on the message that I am cancelling her procedure and to CB at her earliest convenience    Cancelled for:  Colonoscopy 15340

## 2020-01-17 ENCOUNTER — HOSPITAL ENCOUNTER (OUTPATIENT)
Age: 50
Discharge: HOME | End: 2020-01-17
Attending: EMERGENCY MEDICINE
Payer: COMMERCIAL

## 2020-01-17 ENCOUNTER — NURSE ONLY (OUTPATIENT)
Dept: INTERNAL MEDICINE CLINIC | Facility: CLINIC | Age: 50
End: 2020-01-17

## 2020-01-17 VITALS
DIASTOLIC BLOOD PRESSURE: 76 MMHG | SYSTOLIC BLOOD PRESSURE: 136 MMHG | RESPIRATION RATE: 18 BRPM | TEMPERATURE: 98 F | BODY MASS INDEX: 32.47 KG/M2 | OXYGEN SATURATION: 97 % | HEART RATE: 67 BPM | WEIGHT: 202 LBS | HEIGHT: 66 IN

## 2020-01-17 DIAGNOSIS — E53.8 VITAMIN B 12 DEFICIENCY: Primary | ICD-10-CM

## 2020-01-17 DIAGNOSIS — J02.0 STREPTOCOCCAL SORE THROAT: Primary | ICD-10-CM

## 2020-01-17 LAB — S PYO AG THROAT QL: POSITIVE

## 2020-01-17 PROCEDURE — 99214 OFFICE O/P EST MOD 30 MIN: CPT

## 2020-01-17 PROCEDURE — 96372 THER/PROPH/DIAG INJ SC/IM: CPT | Performed by: INTERNAL MEDICINE

## 2020-01-17 PROCEDURE — 96372 THER/PROPH/DIAG INJ SC/IM: CPT

## 2020-01-17 PROCEDURE — 87430 STREP A AG IA: CPT

## 2020-01-17 RX ORDER — FLUCONAZOLE 200 MG/1
200 TABLET ORAL DAILY
Qty: 2 TABLET | Refills: 0 | Status: SHIPPED | OUTPATIENT
Start: 2020-01-17 | End: 2021-03-26

## 2020-01-17 RX ADMIN — CYANOCOBALAMIN 1000 MCG: 1000 INJECTION INTRAMUSCULAR; SUBCUTANEOUS at 12:16:00

## 2020-01-17 NOTE — ED PROVIDER NOTES
Patient Seen in: [facility] In [address] [place] [address]    History   Patient presents with:  Sore Throat    Stated Complaint: Sore Throat    HPI    Patient here complaining of sore throat for 4 days.   Notes pain is described as sore and rates it as Types: 2 Standard drinks or equivalent per week      Comment: social    Drug use: No      Review of Systems    Positive for stated complaint: Sore Throat  Other systems are as noted in HPI. Constitutional and vital signs reviewed.       All other systems r

## 2020-01-17 NOTE — PROGRESS NOTES
Patient is here for Vitamin B12 injection as ordered by Dr. Ana Maria Marte under MAR.  Patient tolerated injection and left with no complaint.     Medication Detail   Medication Ordered Dose Frequency Start End   cyanocobalamin (VITAMIN B12) 1000 MCG/ML injection

## 2020-04-13 ENCOUNTER — TELEPHONE (OUTPATIENT)
Dept: INTERNAL MEDICINE CLINIC | Facility: CLINIC | Age: 50
End: 2020-04-13

## 2020-04-13 ENCOUNTER — NURSE TRIAGE (OUTPATIENT)
Dept: OTHER | Age: 50
End: 2020-04-13

## 2020-04-13 DIAGNOSIS — E53.8 B12 DEFICIENCY: ICD-10-CM

## 2020-04-13 DIAGNOSIS — E78.5 HYPERLIPIDEMIA, UNSPECIFIED HYPERLIPIDEMIA TYPE: Primary | ICD-10-CM

## 2020-04-13 PROCEDURE — 99442 PHONE E/M BY PHYS 11-20 MIN: CPT | Performed by: INTERNAL MEDICINE

## 2020-04-13 RX ORDER — CEPHALEXIN 500 MG/1
500 CAPSULE ORAL 2 TIMES DAILY
Qty: 20 CAPSULE | Refills: 0 | Status: SHIPPED | OUTPATIENT
Start: 2020-04-13 | End: 2020-10-28 | Stop reason: ALTCHOICE

## 2020-04-13 NOTE — TELEPHONE ENCOUNTER
Action Requested: Summary for Provider     []  Critical Lab, Recommendations Needed  [x] Need Additional Advice  []   FYI    []   Need Orders  [] Need Medications Sent to Pharmacy  []  Other     SUMMARY:     Does patient need to be seen or have  telephone

## 2020-04-13 NOTE — TELEPHONE ENCOUNTER
Virtual Telephone Check-In    Ivette Centeno verbally consents to a Virtual/Telephone Check-In visit on 04/13/20. Patient understands and accepts financial responsibility for any deductible, co-insurance and/or co-pays associated with this service. Endometriosis     Tobacco abuse     Depression     Hodgkin's lymphoma ([facility] [address]     Lymphadenopathy     Multiple lung nodules     Anxiety and depression     Annual physical exam     Vaginal candidiasis     Corneal abrasion, right, initial encounter    Current

## 2020-05-18 ENCOUNTER — TELEPHONE (OUTPATIENT)
Dept: INTERNAL MEDICINE CLINIC | Facility: CLINIC | Age: 50
End: 2020-05-18

## 2020-05-18 ENCOUNTER — APPOINTMENT (OUTPATIENT)
Dept: LAB | Age: 50
End: 2020-05-18
Attending: INTERNAL MEDICINE
Payer: COMMERCIAL

## 2020-05-18 DIAGNOSIS — E53.8 B12 DEFICIENCY: ICD-10-CM

## 2020-05-18 DIAGNOSIS — E55.9 VITAMIN D DEFICIENCY: ICD-10-CM

## 2020-05-18 DIAGNOSIS — E78.5 HYPERLIPIDEMIA, UNSPECIFIED HYPERLIPIDEMIA TYPE: ICD-10-CM

## 2020-05-18 PROCEDURE — 80048 BASIC METABOLIC PNL TOTAL CA: CPT

## 2020-05-18 PROCEDURE — 84450 TRANSFERASE (AST) (SGOT): CPT

## 2020-05-18 PROCEDURE — 80061 LIPID PANEL: CPT

## 2020-05-18 PROCEDURE — 83036 HEMOGLOBIN GLYCOSYLATED A1C: CPT

## 2020-05-18 PROCEDURE — 84460 ALANINE AMINO (ALT) (SGPT): CPT

## 2020-05-18 PROCEDURE — 36415 COLL VENOUS BLD VENIPUNCTURE: CPT

## 2020-05-18 PROCEDURE — 82306 VITAMIN D 25 HYDROXY: CPT

## 2020-05-18 PROCEDURE — 82607 VITAMIN B-12: CPT

## 2020-05-19 ENCOUNTER — NURSE ONLY (OUTPATIENT)
Dept: INTERNAL MEDICINE CLINIC | Facility: CLINIC | Age: 50
End: 2020-05-19

## 2020-05-19 DIAGNOSIS — E53.8 VITAMIN B 12 DEFICIENCY: ICD-10-CM

## 2020-05-19 PROCEDURE — 96372 THER/PROPH/DIAG INJ SC/IM: CPT | Performed by: INTERNAL MEDICINE

## 2020-05-19 RX ADMIN — CYANOCOBALAMIN 1000 MCG: 1000 INJECTION INTRAMUSCULAR; SUBCUTANEOUS at 13:10:00

## 2020-06-01 ENCOUNTER — VIRTUAL PHONE E/M (OUTPATIENT)
Dept: INTERNAL MEDICINE CLINIC | Facility: CLINIC | Age: 50
End: 2020-06-01

## 2020-06-01 DIAGNOSIS — Z12.31 VISIT FOR SCREENING MAMMOGRAM: ICD-10-CM

## 2020-06-01 DIAGNOSIS — E78.5 HYPERLIPIDEMIA, UNSPECIFIED HYPERLIPIDEMIA TYPE: ICD-10-CM

## 2020-06-01 DIAGNOSIS — E53.8 VITAMIN B 12 DEFICIENCY: ICD-10-CM

## 2020-06-01 DIAGNOSIS — H93.11 TINNITUS OF RIGHT EAR: ICD-10-CM

## 2020-06-01 DIAGNOSIS — L65.9 HAIR LOSS: Primary | ICD-10-CM

## 2020-06-01 PROCEDURE — 99213 OFFICE O/P EST LOW 20 MIN: CPT | Performed by: INTERNAL MEDICINE

## 2020-06-01 NOTE — PROGRESS NOTES
Virtual Telephone Check-In    Myriam Morrison verbally consents to a Virtual/Telephone Check-In visit on 06/1/20. Patient has been referred to the [facility] website at [email or web address] to review the yearly Consent to Treat document.     Patient under Take 1 tablet (200 mg total) by mouth daily.  If no improvement in 2 days, please take the 2nd dose 2 tablet 0   • PEG 3350-KCl-NaBcb-NaCl-NaSulf (COLYTE WITH FLAVOR PACKS) 240 g Oral Recon Soln As directed per GI consult notes (Patient not taking: Reported diagnosis)  Plan: DERM - INTERNAL        Referral  Try using organic hair products    (H93.11) Tinnitus of right ear  Plan: ENT - INTERNAL        Referral given    (Z12.31) Visit for screening mammogram  Plan: [facility]BE 2D+3D SCREENING BILAT         (CPT=77

## 2020-06-12 ENCOUNTER — NURSE TRIAGE (OUTPATIENT)
Dept: INTERNAL MEDICINE CLINIC | Facility: CLINIC | Age: 50
End: 2020-06-12

## 2020-06-12 ENCOUNTER — OFFICE VISIT (OUTPATIENT)
Dept: INTERNAL MEDICINE CLINIC | Facility: CLINIC | Age: 50
End: 2020-06-12

## 2020-06-12 VITALS
BODY MASS INDEX: 31.18 KG/M2 | HEIGHT: 66 IN | TEMPERATURE: 98 F | WEIGHT: 194 LBS | SYSTOLIC BLOOD PRESSURE: 130 MMHG | HEART RATE: 97 BPM | DIASTOLIC BLOOD PRESSURE: 88 MMHG

## 2020-06-12 DIAGNOSIS — L65.9 HAIR LOSS: Primary | ICD-10-CM

## 2020-06-12 PROCEDURE — 99213 OFFICE O/P EST LOW 20 MIN: CPT | Performed by: PHYSICIAN ASSISTANT

## 2020-06-12 NOTE — TELEPHONE ENCOUNTER
Action Requested: Summary for Provider     []  Critical Lab, Recommendations Needed  [] Need Additional Advice  []   FYI    []   Need Orders  [] Need Medications Sent to Pharmacy  []  Other     SUMMARY:    Appointment made for today 6/12/2020  Instructed n

## 2020-06-12 NOTE — PROGRESS NOTES
HPI:    Patient ID: Jenny Rhoades is a 51year old female. HPI   Pt presents for office visit of increased hair loss and scalp burning for the past year. Notes the scalp burning for a year. Hair loss increased over the past few months.  Emotional in OTHER (SEE COMMENTS)    Comment:Hyper and aggressive.   History:  Past Medical History:   Diagnosis Date   • Endometriosis    • Hodgkin lymphoma ([facility] [address]     1996   • Tachycardia      Social History    Tobacco Use      Smoking status: Former Smoke cervical adenopathy. Neurological: She is alert and oriented to person, place, and time. Skin: Skin is warm and dry. Psychiatric: She has a normal mood and affect.  Her behavior is normal. Judgment and thought content normal.              ASSESSMENT/P

## 2020-06-15 ENCOUNTER — OFFICE VISIT (OUTPATIENT)
Dept: DERMATOLOGY CLINIC | Facility: CLINIC | Age: 50
End: 2020-06-15

## 2020-06-15 DIAGNOSIS — L30.8 PSORIASIFORM DERMATITIS: Primary | ICD-10-CM

## 2020-06-15 DIAGNOSIS — L65.0 TELOGEN EFFLUVIUM: ICD-10-CM

## 2020-06-15 PROCEDURE — 99213 OFFICE O/P EST LOW 20 MIN: CPT | Performed by: DERMATOLOGY

## 2020-06-15 RX ORDER — BETAMETHASONE DIPROPIONATE 0.5 MG/G
LOTION TOPICAL
Qty: 60 ML | Refills: 5 | Status: SHIPPED | OUTPATIENT
Start: 2020-06-15 | End: 2021-03-26

## 2020-06-21 NOTE — PROGRESS NOTES
Lise Beckman is a 51year old female. Patient presents with:  Derm Problem: LOV 8/16/18. pt presenting today with burning and rapid hairloss since April. c/o constant burning to scalp for a week. Some reilef when used head and shoulders. pt maynor Cigarettes        Quit date: 2016        Years since quittin.4      Smokeless tobacco: Never Used    Alcohol use: Yes      Alcohol/week: 2.0 standard drinks      Types: 2 Standard drinks or equivalent per week      Comment: social    Drug use:  No 1985/86?    • REMOVAL OF OVARY(S)      2011 Left     Social History    Socioeconomic History      Marital status:       Spouse name: Not on file      Number of children: Not on file      Years of education: Not on file      Highest education level: N Concern: Not Asked        Special Diet: Not Asked        Back Care: Not Asked        Exercise: Not Asked        Bike Helmet: Not Asked        Seat Belt: Not Asked        Self-Exams: Not Asked        Grew up on a farm: Not Asked        History of tanning: N sensitivity to the sun, deeper lumps or bumps. No other skin complaints. History, medications, allergies as noted. Physical examination: Patient  well-developed well-nourished, alert oriented in no acute distress.   Exam of involved, appropriate areas shoulders. Management of psoriasiform dermatitis discussed. Will recheck in 4 to 6 weeks if needed. No other suspicious lesions. Continue careful sun protection.     There is some seborrhea at alar creases hydrocortisone only if necessary, may use nemesio

## 2020-06-25 ENCOUNTER — OFFICE VISIT (OUTPATIENT)
Dept: DERMATOLOGY CLINIC | Facility: CLINIC | Age: 50
End: 2020-06-25

## 2020-06-25 DIAGNOSIS — L30.8 PSORIASIFORM DERMATITIS: Primary | ICD-10-CM

## 2020-06-25 PROCEDURE — 99212 OFFICE O/P EST SF 10 MIN: CPT | Performed by: DERMATOLOGY

## 2020-06-25 RX ORDER — IBUPROFEN 800 MG/1
TABLET ORAL
COMMUNITY
Start: 2020-06-24 | End: 2020-10-28

## 2020-06-25 RX ORDER — CHLORHEXIDINE GLUCONATE 0.12 MG/ML
RINSE ORAL
COMMUNITY
Start: 2020-06-24 | End: 2021-03-26

## 2020-06-25 RX ORDER — AMOXICILLIN 500 MG/1
TABLET, FILM COATED ORAL
COMMUNITY
Start: 2020-06-24 | End: 2021-03-26

## 2020-06-29 NOTE — PROGRESS NOTES
Titus Grey is a 51year old female. Patient presents with:  Derm Problem: LOV6/15/20. Pt presenting today with psoriasiform dermatitis f/u. Denies itching and burning to scalp. Slight improvement.  pt currently using Betamethasone with improvem Quit date: 2016        Years since quittin.4      Smokeless tobacco: Never Used    Alcohol use: Yes      Alcohol/week: 2.0 standard drinks      Types: 2 Standard drinks or equivalent per week      Comment: social    Drug use:  No                Cur lymphoma   • EUSEBIA LOCALIZATION WIRE 1 SITE RIGHT (UBS=[id number])      1985/86?    • REMOVAL OF OVARY(S)      2011 Left     Social History    Socioeconomic History      Marital status:       Spouse name: Not on file      Number of children: Not on file Concern: Not Asked        Stress Concern: Not Asked        Weight Concern: Not Asked        Special Diet: Not Asked        Back Care: Not Asked        Exercise: Not Asked        Bike Helmet: Not Asked        Seat Belt: Not Asked        Self-Exams: Not Aske scalp.         ASSESSMENT AND PLAN:     Psoriasiform dermatitis  (primary encounter diagnosis)    Assessment / plan:    Likely telogen effluvium with hair loss related to her strep. Stress and vitamin deficiency might be contributory as well.   Current pso agrees to the plan. The patient is asked to return as noted in follow-up /as noted above    This note was generated using Dragon voice recognition software. Please contact me regarding any confusion resulting from errors in recognition.     No orders of t

## 2020-09-24 ENCOUNTER — OFFICE VISIT (OUTPATIENT)
Dept: DERMATOLOGY CLINIC | Facility: CLINIC | Age: 50
End: 2020-09-24

## 2020-09-24 DIAGNOSIS — L30.8 PSORIASIFORM DERMATITIS: Primary | ICD-10-CM

## 2020-09-24 PROCEDURE — 99213 OFFICE O/P EST LOW 20 MIN: CPT | Performed by: DERMATOLOGY

## 2020-09-24 RX ORDER — CLINDAMYCIN PHOSPHATE 11.9 MG/ML
1 SOLUTION TOPICAL 2 TIMES DAILY
Qty: 60 ML | Refills: 12 | Status: SHIPPED | OUTPATIENT
Start: 2020-09-24 | End: 2020-10-24

## 2020-10-01 ENCOUNTER — LAB ENCOUNTER (OUTPATIENT)
Dept: LAB | Age: 50
End: 2020-10-01
Attending: INTERNAL MEDICINE
Payer: COMMERCIAL

## 2020-10-01 DIAGNOSIS — R79.89 LOW VITAMIN D LEVEL: ICD-10-CM

## 2020-10-01 PROCEDURE — 36415 COLL VENOUS BLD VENIPUNCTURE: CPT

## 2020-10-01 PROCEDURE — 82306 VITAMIN D 25 HYDROXY: CPT

## 2020-10-04 NOTE — PROGRESS NOTES
Lorin Velazquez is a 53year old female. Patient presents with:  Derm Problem: LOV 6/25/20. pt presenting today with Psoriasiform dermatitis f/u. pt states slight shedding. pt currenty using Betamethsone Lotion with improvement.             Entex an drinks      Types: 2 Standard drinks or equivalent per week      Comment: social    Drug use:  No                Current Outpatient Medications   Medication Sig Dispense Refill   • Clindamycin Phosphate 1 % External Solution Apply 1 Application topically 2 education: Not on file      Highest education level: Not on file    Occupational History      Not on file    Social Needs      Financial resource strain: Not on file      Food insecurity        Worry: Not on file        Inability: Not on file      Transpor on a farm: Not Asked        History of tanning: Not Asked        Outdoor occupation: Not Asked        Breast feeding: Not Asked        Reaction to local anesthetic: No    Social History Narrative      Not on file    Family History   Problem Relation Age of plan:    Likely telogen effluvium with hair loss related to her strep. Resolved. Stress and vitamin deficiency might be contributory as well. Current psoriasiform process likely aggravating some of the shedding.       Psoriasiform inflammatory component reviewed. Sunscreen use, sun protection, encouraged. Followup as noted in rtc or p.r.n. The patient indicates understanding of these issues and agrees to the plan.   The patient is asked to return as noted in follow-up /as noted above    This note was

## 2020-10-11 ENCOUNTER — HOSPITAL ENCOUNTER (EMERGENCY)
Facility: HOSPITAL | Age: 50
Discharge: HOME | End: 2020-10-12
Attending: EMERGENCY MEDICINE
Payer: COMMERCIAL

## 2020-10-11 ENCOUNTER — APPOINTMENT (OUTPATIENT)
Dept: ULTRASOUND IMAGING | Facility: HOSPITAL | Age: 50
End: 2020-10-11
Attending: EMERGENCY MEDICINE
Payer: COMMERCIAL

## 2020-10-11 DIAGNOSIS — K80.50 BILIARY COLIC: ICD-10-CM

## 2020-10-11 DIAGNOSIS — K80.20 CALCULUS OF GALLBLADDER WITHOUT CHOLECYSTITIS WITHOUT OBSTRUCTION: Primary | ICD-10-CM

## 2020-10-11 PROCEDURE — 80048 BASIC METABOLIC PNL TOTAL CA: CPT

## 2020-10-11 PROCEDURE — 85025 COMPLETE CBC W/AUTO DIFF WBC: CPT

## 2020-10-11 PROCEDURE — 99284 EMERGENCY DEPT VISIT MOD MDM: CPT

## 2020-10-11 PROCEDURE — 85027 COMPLETE CBC AUTOMATED: CPT

## 2020-10-11 PROCEDURE — 83690 ASSAY OF LIPASE: CPT | Performed by: EMERGENCY MEDICINE

## 2020-10-11 PROCEDURE — 93005 ELECTROCARDIOGRAM TRACING: CPT

## 2020-10-11 PROCEDURE — 85060 BLOOD SMEAR INTERPRETATION: CPT | Performed by: EMERGENCY MEDICINE

## 2020-10-11 PROCEDURE — 96374 THER/PROPH/DIAG INJ IV PUSH: CPT

## 2020-10-11 PROCEDURE — 93010 ELECTROCARDIOGRAM REPORT: CPT | Performed by: EMERGENCY MEDICINE

## 2020-10-11 PROCEDURE — 76705 ECHO EXAM OF ABDOMEN: CPT | Performed by: EMERGENCY MEDICINE

## 2020-10-11 PROCEDURE — 85025 COMPLETE CBC W/AUTO DIFF WBC: CPT | Performed by: EMERGENCY MEDICINE

## 2020-10-11 PROCEDURE — 80076 HEPATIC FUNCTION PANEL: CPT | Performed by: EMERGENCY MEDICINE

## 2020-10-11 PROCEDURE — 85007 BL SMEAR W/DIFF WBC COUNT: CPT

## 2020-10-11 PROCEDURE — 80048 BASIC METABOLIC PNL TOTAL CA: CPT | Performed by: EMERGENCY MEDICINE

## 2020-10-11 RX ORDER — KETOROLAC TROMETHAMINE 30 MG/ML
INJECTION, SOLUTION INTRAMUSCULAR; INTRAVENOUS
Status: COMPLETED
Start: 2020-10-11 | End: 2020-10-11

## 2020-10-11 RX ORDER — KETOROLAC TROMETHAMINE 15 MG/ML
15 INJECTION, SOLUTION INTRAMUSCULAR; INTRAVENOUS ONCE
Status: COMPLETED | OUTPATIENT
Start: 2020-10-11 | End: 2020-10-11

## 2020-10-12 VITALS
BODY MASS INDEX: 30.53 KG/M2 | DIASTOLIC BLOOD PRESSURE: 88 MMHG | TEMPERATURE: 98 F | OXYGEN SATURATION: 100 % | WEIGHT: 190 LBS | HEART RATE: 79 BPM | SYSTOLIC BLOOD PRESSURE: 130 MMHG | RESPIRATION RATE: 16 BRPM | HEIGHT: 66 IN

## 2020-10-12 PROCEDURE — 80048 BASIC METABOLIC PNL TOTAL CA: CPT | Performed by: EMERGENCY MEDICINE

## 2020-10-12 PROCEDURE — 83690 ASSAY OF LIPASE: CPT | Performed by: EMERGENCY MEDICINE

## 2020-10-12 PROCEDURE — 80076 HEPATIC FUNCTION PANEL: CPT | Performed by: EMERGENCY MEDICINE

## 2020-10-12 RX ORDER — HYDROCODONE BITARTRATE AND ACETAMINOPHEN 5; 325 MG/1; MG/1
1 TABLET ORAL EVERY 6 HOURS PRN
Qty: 10 TABLET | Refills: 0 | Status: SHIPPED | OUTPATIENT
Start: 2020-10-12 | End: 2021-03-26

## 2020-10-12 RX ORDER — ONDANSETRON 4 MG/1
4 TABLET, ORALLY DISINTEGRATING ORAL EVERY 8 HOURS PRN
Qty: 6 TABLET | Refills: 0 | Status: SHIPPED | OUTPATIENT
Start: 2020-10-12 | End: 2021-03-26

## 2020-10-12 NOTE — ED PROVIDER NOTES
Patient Seen in: [facility] Emergency Department      History   Patient presents with:  Abdominal Pain  Back Pain    Stated Complaint: Pain    HPI    54 y/o female w/ remote h/o Hodgkin's lymphoma without abdominal surgeries who presents for evalu Exam    Constitutional: Oriented to person, place, and time. Appears well-developed. No distress. Patient is in pain and does appear uncomfortable  Head: Normocephalic and atraumatic.    Eyes: Conjunctivae are normal. Pupils are equal, round, and reactive CBC W/ DIFFERENTIAL[[id number]]          Abnormal            Preliminary result           Please view results for these tests on the individual orders.    URINALYSIS WITH CULTURE REFLEX   MANUAL DIFFERENTIAL   MD BLOOD SMEAR CONSULT   RAINBOW DRAW BLUE   R colic    Disposition:  Discharge  10/12/2020  2:02 am    Follow-up:  Mary Grace Hammonds, [address]  [address] [facility] [phone]    Call today      Roderick Kumar MD  [address] [facility]  [id number]  [address] [facility] [address] [phone]    Schedule an appo

## 2020-10-12 NOTE — ED NOTES
Pt resting comfortably, denies any needs or concerns at this time. Pt updated on POC, awaiting lab and imaging results. Pt reports pain has improved since medication administration.

## 2020-10-20 ENCOUNTER — NURSE ONLY (OUTPATIENT)
Dept: INTERNAL MEDICINE CLINIC | Facility: CLINIC | Age: 50
End: 2020-10-20

## 2020-10-20 DIAGNOSIS — E53.8 B12 DEFICIENCY: Primary | ICD-10-CM

## 2020-10-20 PROCEDURE — 96372 THER/PROPH/DIAG INJ SC/IM: CPT | Performed by: INTERNAL MEDICINE

## 2020-10-20 RX ADMIN — CYANOCOBALAMIN 1000 MCG: 1000 INJECTION INTRAMUSCULAR; SUBCUTANEOUS at 14:14:00

## 2020-10-20 NOTE — PROGRESS NOTES
Pt present to office for B12 injection, Name and  confirmed, Injection given right deltoid, pt showed no reaction to injection

## 2020-10-28 ENCOUNTER — OFFICE VISIT (OUTPATIENT)
Dept: INTERNAL MEDICINE CLINIC | Facility: CLINIC | Age: 50
End: 2020-10-28

## 2020-10-28 VITALS
HEART RATE: 80 BPM | HEIGHT: 66 IN | BODY MASS INDEX: 31.66 KG/M2 | WEIGHT: 197 LBS | SYSTOLIC BLOOD PRESSURE: 138 MMHG | DIASTOLIC BLOOD PRESSURE: 86 MMHG

## 2020-10-28 DIAGNOSIS — Z78.0 POSTMENOPAUSAL: ICD-10-CM

## 2020-10-28 DIAGNOSIS — Z00.00 ROUTINE GENERAL MEDICAL EXAMINATION AT A HEALTH CARE FACILITY: Primary | ICD-10-CM

## 2020-10-28 DIAGNOSIS — Z85.79 HISTORY OF LYMPHOMA: ICD-10-CM

## 2020-10-28 DIAGNOSIS — Z12.4 CERVICAL CANCER SCREENING: ICD-10-CM

## 2020-10-28 DIAGNOSIS — E28.39 MENOPAUSE OVARIAN FAILURE: ICD-10-CM

## 2020-10-28 PROCEDURE — 3075F SYST BP GE 130 - 139MM HG: CPT | Performed by: INTERNAL MEDICINE

## 2020-10-28 PROCEDURE — 3008F BODY MASS INDEX DOCD: CPT | Performed by: INTERNAL MEDICINE

## 2020-10-28 PROCEDURE — 99396 PREV VISIT EST AGE 40-64: CPT | Performed by: INTERNAL MEDICINE

## 2020-10-28 PROCEDURE — 3079F DIAST BP 80-89 MM HG: CPT | Performed by: INTERNAL MEDICINE

## 2020-10-28 RX ORDER — MULTIVIT-MIN/IRON/FOLIC ACID/K 18-600-40
CAPSULE ORAL
COMMUNITY

## 2020-10-28 NOTE — PROGRESS NOTES
HPI:    Patient ID: Supriya Clark is a 53year old female.     HPI    Physical exam    LMP 8 years ago   one ovary removed  Cyst left side  Periods irregular  early  /86 (BP Location: Right arm, Patient Position: Sitting, Cuff Size: large)   Puls (VITAMIN D) 50 MCG (2000 UT) Oral Cap Take by mouth. • Cyanocobalamin (VITAMIN B 12 OR) Take by mouth. • Multiple Vitamins-Minerals (MULTIVITAMIN WOMEN OR) Take by mouth.      • HYDROcodone-acetaminophen 5-325 MG Oral Tab Take 1 tablet by mouth ever Smoker        Packs/day: 0.50        Years: 25.00        Pack years: 12.5        Types: Cigarettes        Quit date: 2016        Years since quittin.7      Smokeless tobacco: Former User    Alcohol use:  Yes      Alcohol/week: 2.0 standard drinks Component      Latest Ref Rng & Units 10/12/2020   Glucose      70 - 99 mg/dL 102 (H)   Sodium      136 - 145 mmol/L 143   Potassium      3.5 - 5.1 mmol/L 4.4   Chloride      98 - 112 mmol/L 110   Carbon Dioxide, Total      21.0 - 32.0 mmol/L 30.0   A 136 - 145 mmol/L    Potassium      3.5 - 5.1 mmol/L    Chloride      98 - 112 mmol/L    Carbon Dioxide, Total      21.0 - 32.0 mmol/L    ANION GAP      0 - 18 mmol/L    BUN      7 - 18 mg/dL    CREATININE      0.55 - 1.02 mg/dL    BUN/CREAT Ratio      1 population comprised predominantly of small and intermediate-sized lymphocytes with round nuclear contours and coarse, mature-appearing chromatin.  There are no significant morphologic abnormalities in the red blood cells, other white blood cell subsets, or

## 2020-10-30 ENCOUNTER — TELEPHONE (OUTPATIENT)
Dept: INTERNAL MEDICINE CLINIC | Facility: CLINIC | Age: 50
End: 2020-10-30

## 2020-10-30 NOTE — TELEPHONE ENCOUNTER
Called patient and confirmed that her primary provider is Dr. Mishel Verma. Removed Dr. Lisette Avendano as PCP and patient will contact her insurance company to correct.

## 2020-11-06 PROBLEM — S05.01XA CORNEAL ABRASION, RIGHT, INITIAL ENCOUNTER: Status: RESOLVED | Noted: 2018-07-19 | Resolved: 2020-11-06

## 2020-11-20 ENCOUNTER — NURSE ONLY (OUTPATIENT)
Dept: INTERNAL MEDICINE CLINIC | Facility: CLINIC | Age: 50
End: 2020-11-20

## 2020-11-20 DIAGNOSIS — E53.8 B12 DEFICIENCY: Primary | ICD-10-CM

## 2020-11-20 PROCEDURE — 96372 THER/PROPH/DIAG INJ SC/IM: CPT | Performed by: INTERNAL MEDICINE

## 2020-11-20 RX ADMIN — CYANOCOBALAMIN 1000 MCG: 1000 INJECTION INTRAMUSCULAR; SUBCUTANEOUS at 16:31:00

## 2020-11-25 ENCOUNTER — TELEPHONE (OUTPATIENT)
Dept: PULMONOLOGY | Facility: CLINIC | Age: 50
End: 2020-11-25

## 2020-12-03 ENCOUNTER — TELEPHONE (OUTPATIENT)
Dept: INTERNAL MEDICINE CLINIC | Facility: CLINIC | Age: 50
End: 2020-12-03

## 2020-12-03 NOTE — TELEPHONE ENCOUNTER
Is not my patient is Dr. William Herrera patient please ask the patient to call her insurance and change PCP

## 2020-12-17 ENCOUNTER — NURSE ONLY (OUTPATIENT)
Dept: INTERNAL MEDICINE CLINIC | Facility: CLINIC | Age: 50
End: 2020-12-17

## 2020-12-17 DIAGNOSIS — E53.8 VITAMIN B 12 DEFICIENCY: Primary | ICD-10-CM

## 2020-12-17 PROCEDURE — 96372 THER/PROPH/DIAG INJ SC/IM: CPT | Performed by: INTERNAL MEDICINE

## 2020-12-17 RX ADMIN — CYANOCOBALAMIN 1000 MCG: 1000 INJECTION INTRAMUSCULAR; SUBCUTANEOUS at 09:08:00

## 2020-12-31 ENCOUNTER — HOSPITAL ENCOUNTER (OUTPATIENT)
Age: 50
Discharge: HOME | End: 2020-12-31
Attending: FAMILY MEDICINE
Payer: COMMERCIAL

## 2020-12-31 VITALS
HEIGHT: 67 IN | WEIGHT: 192 LBS | HEART RATE: 116 BPM | RESPIRATION RATE: 20 BRPM | OXYGEN SATURATION: 97 % | TEMPERATURE: 97 F | BODY MASS INDEX: 30.13 KG/M2 | DIASTOLIC BLOOD PRESSURE: 89 MMHG | SYSTOLIC BLOOD PRESSURE: 152 MMHG

## 2020-12-31 DIAGNOSIS — J02.0 STREP PHARYNGITIS: Primary | ICD-10-CM

## 2020-12-31 DIAGNOSIS — Z20.822 ENCOUNTER FOR LABORATORY TESTING FOR COVID-19 VIRUS: ICD-10-CM

## 2020-12-31 LAB — S PYO AG THROAT QL: POSITIVE

## 2020-12-31 PROCEDURE — 99213 OFFICE O/P EST LOW 20 MIN: CPT | Performed by: FAMILY MEDICINE

## 2020-12-31 PROCEDURE — 87880 STREP A ASSAY W/OPTIC: CPT | Performed by: FAMILY MEDICINE

## 2020-12-31 RX ORDER — AMOXICILLIN 875 MG/1
875 TABLET, COATED ORAL 2 TIMES DAILY
Qty: 20 TABLET | Refills: 0 | Status: SHIPPED | OUTPATIENT
Start: 2020-12-31 | End: 2021-01-10

## 2020-12-31 NOTE — ED PROVIDER NOTES
Patient Seen in: [facility] [place]    History   Patient presents with:  Sore Throat    Stated Complaint: sore throat    HPI    Patient here with nasal congestion and sore throat for 1 days. No travel,unknown sick contacts .   Patient denies sig shor acne  Patient not taking: Reported on 10/28/2020        Family History   Problem Relation Age of Onset   • Heart Disorder Mother 80   • Cancer Self    • Glaucoma Neg    • Macular degeneration Neg    • Diabetes Neg        Family history reviewed with goldy Value    POCT Rapid Strep Positive (*)     All other components within normal limits   SARS-COV-2 RNA,QUAL RT-PCR ([facility])       MDM         Disposition and Plan     Clinical Impression:  Strep pharyngitis  (primary encounter diagnosis)  Encounter for labor

## 2020-12-31 NOTE — ED INITIAL ASSESSMENT (HPI)
Bilateral ears clogged yesterday. Pt complaining of sore throat yesterday. No fever.   Pt took one dose of amoxicillin 500mg

## 2021-01-02 LAB — SARS-COV-2 RNA,QUAL, RT-PCR: NOT DETECTED

## 2021-01-15 ENCOUNTER — NURSE ONLY (OUTPATIENT)
Dept: INTERNAL MEDICINE CLINIC | Facility: CLINIC | Age: 51
End: 2021-01-15

## 2021-01-15 DIAGNOSIS — E53.8 VITAMIN B 12 DEFICIENCY: Primary | ICD-10-CM

## 2021-01-15 PROCEDURE — 96372 THER/PROPH/DIAG INJ SC/IM: CPT | Performed by: INTERNAL MEDICINE

## 2021-01-15 RX ADMIN — CYANOCOBALAMIN 1000 MCG: 1000 INJECTION INTRAMUSCULAR; SUBCUTANEOUS at 15:27:00

## 2021-01-20 ENCOUNTER — HOSPITAL ENCOUNTER (OUTPATIENT)
Dept: BONE DENSITY | Age: 51
Discharge: HOME | End: 2021-01-20
Attending: INTERNAL MEDICINE
Payer: COMMERCIAL

## 2021-01-20 DIAGNOSIS — Z78.0 POSTMENOPAUSAL: ICD-10-CM

## 2021-01-20 PROCEDURE — 77080 DXA BONE DENSITY AXIAL: CPT | Performed by: INTERNAL MEDICINE

## 2021-02-12 ENCOUNTER — NURSE ONLY (OUTPATIENT)
Dept: INTERNAL MEDICINE CLINIC | Facility: CLINIC | Age: 51
End: 2021-02-12

## 2021-02-12 DIAGNOSIS — E53.8 VITAMIN B 12 DEFICIENCY: Primary | ICD-10-CM

## 2021-02-12 PROCEDURE — 96372 THER/PROPH/DIAG INJ SC/IM: CPT | Performed by: INTERNAL MEDICINE

## 2021-02-12 RX ADMIN — CYANOCOBALAMIN 1000 MCG: 1000 INJECTION INTRAMUSCULAR; SUBCUTANEOUS at 14:39:00

## 2021-02-12 NOTE — PROGRESS NOTES
Patient tolerated injection well without complaint or reaction of any kind seen or reported by patient.

## 2021-02-28 ENCOUNTER — HOSPITAL ENCOUNTER (EMERGENCY)
Facility: HOSPITAL | Age: 51
Discharge: HOME | End: 2021-02-28
Attending: EMERGENCY MEDICINE
Payer: COMMERCIAL

## 2021-02-28 ENCOUNTER — APPOINTMENT (OUTPATIENT)
Dept: ULTRASOUND IMAGING | Facility: HOSPITAL | Age: 51
End: 2021-02-28
Attending: EMERGENCY MEDICINE
Payer: COMMERCIAL

## 2021-02-28 VITALS
OXYGEN SATURATION: 96 % | TEMPERATURE: 98 F | HEART RATE: 66 BPM | DIASTOLIC BLOOD PRESSURE: 79 MMHG | RESPIRATION RATE: 18 BRPM | SYSTOLIC BLOOD PRESSURE: 135 MMHG | HEIGHT: 66 IN | BODY MASS INDEX: 30.53 KG/M2 | WEIGHT: 190 LBS

## 2021-02-28 DIAGNOSIS — K80.20 CALCULUS OF GALLBLADDER WITHOUT CHOLECYSTITIS WITHOUT OBSTRUCTION: ICD-10-CM

## 2021-02-28 DIAGNOSIS — K80.50 BILIARY COLIC: Primary | ICD-10-CM

## 2021-02-28 LAB
ALBUMIN SERPL-MCNC: 4.1 G/DL (ref 3.4–5)
ALP LIVER SERPL-CCNC: 78 U/L
ALT SERPL-CCNC: 41 U/L
ANION GAP SERPL CALC-SCNC: 2 MMOL/L (ref 0–18)
AST SERPL-CCNC: 27 U/L (ref 15–37)
B-HCG UR QL: NEGATIVE
BASOPHILS # BLD AUTO: 0.09 X10(3) UL (ref 0–0.2)
BASOPHILS NFR BLD AUTO: 0.8 %
BILIRUB DIRECT SERPL-MCNC: <0.1 MG/DL (ref 0–0.2)
BILIRUB SERPL-MCNC: 0.3 MG/DL (ref 0.1–2)
BILIRUB UR QL: NEGATIVE
BUN BLD-MCNC: 9 MG/DL (ref 7–18)
BUN/CREAT SERPL: 8.1 (ref 10–20)
CALCIUM BLD-MCNC: 8.8 MG/DL (ref 8.5–10.1)
CHLORIDE SERPL-SCNC: 111 MMOL/L (ref 98–112)
CO2 SERPL-SCNC: 30 MMOL/L (ref 21–32)
COLOR UR: YELLOW
CREAT BLD-MCNC: 1.11 MG/DL
DEPRECATED RDW RBC AUTO: 40.3 FL (ref 35.1–46.3)
EOSINOPHIL # BLD AUTO: 0.24 X10(3) UL (ref 0–0.7)
EOSINOPHIL NFR BLD AUTO: 2.1 %
ERYTHROCYTE [DISTWIDTH] IN BLOOD BY AUTOMATED COUNT: 12.3 % (ref 11–15)
GLUCOSE BLD-MCNC: 101 MG/DL (ref 70–99)
GLUCOSE UR-MCNC: NEGATIVE MG/DL
HCT VFR BLD AUTO: 44.4 %
HGB BLD-MCNC: 14.9 G/DL
HGB UR QL STRIP.AUTO: NEGATIVE
IMM GRANULOCYTES # BLD AUTO: 0.03 X10(3) UL (ref 0–1)
IMM GRANULOCYTES NFR BLD: 0.3 %
KETONES UR-MCNC: NEGATIVE MG/DL
LIPASE SERPL-CCNC: 292 U/L (ref 73–393)
LYMPHOCYTES # BLD AUTO: 4.92 X10(3) UL (ref 1–4)
LYMPHOCYTES NFR BLD AUTO: 43.2 %
M PROTEIN MFR SERPL ELPH: 8.3 G/DL (ref 6.4–8.2)
MCH RBC QN AUTO: 30 PG (ref 26–34)
MCHC RBC AUTO-ENTMCNC: 33.6 G/DL (ref 31–37)
MCV RBC AUTO: 89.3 FL
MONOCYTES # BLD AUTO: 1.06 X10(3) UL (ref 0.1–1)
MONOCYTES NFR BLD AUTO: 9.3 %
NEUTROPHILS # BLD AUTO: 5.06 X10 (3) UL (ref 1.5–7.7)
NEUTROPHILS # BLD AUTO: 5.06 X10(3) UL (ref 1.5–7.7)
NEUTROPHILS NFR BLD AUTO: 44.3 %
NITRITE UR QL STRIP.AUTO: NEGATIVE
OSMOLALITY SERPL CALC.SUM OF ELEC: 295 MOSM/KG (ref 275–295)
PH UR: 6 [PH] (ref 5–8)
PLATELET # BLD AUTO: 380 10(3)UL (ref 150–450)
POTASSIUM SERPL-SCNC: 4.1 MMOL/L (ref 3.5–5.1)
PROT UR-MCNC: NEGATIVE MG/DL
RBC # BLD AUTO: 4.97 X10(6)UL
SODIUM SERPL-SCNC: 143 MMOL/L (ref 136–145)
SP GR UR STRIP: 1.02 (ref 1–1.03)
UROBILINOGEN UR STRIP-ACNC: <2
WBC # BLD AUTO: 11.4 X10(3) UL (ref 4–11)

## 2021-02-28 PROCEDURE — 99284 EMERGENCY DEPT VISIT MOD MDM: CPT

## 2021-02-28 PROCEDURE — 76705 ECHO EXAM OF ABDOMEN: CPT | Performed by: EMERGENCY MEDICINE

## 2021-02-28 PROCEDURE — 96374 THER/PROPH/DIAG INJ IV PUSH: CPT

## 2021-02-28 PROCEDURE — 83690 ASSAY OF LIPASE: CPT | Performed by: EMERGENCY MEDICINE

## 2021-02-28 PROCEDURE — 80076 HEPATIC FUNCTION PANEL: CPT | Performed by: EMERGENCY MEDICINE

## 2021-02-28 PROCEDURE — 85025 COMPLETE CBC W/AUTO DIFF WBC: CPT | Performed by: EMERGENCY MEDICINE

## 2021-02-28 PROCEDURE — 81001 URINALYSIS AUTO W/SCOPE: CPT | Performed by: EMERGENCY MEDICINE

## 2021-02-28 PROCEDURE — 81025 URINE PREGNANCY TEST: CPT

## 2021-02-28 PROCEDURE — 96375 TX/PRO/DX INJ NEW DRUG ADDON: CPT

## 2021-02-28 PROCEDURE — 80048 BASIC METABOLIC PNL TOTAL CA: CPT | Performed by: EMERGENCY MEDICINE

## 2021-02-28 RX ORDER — KETOROLAC TROMETHAMINE 10 MG/1
10 TABLET, FILM COATED ORAL EVERY 6 HOURS PRN
Qty: 20 TABLET | Refills: 0 | Status: SHIPPED | OUTPATIENT
Start: 2021-02-28 | End: 2021-03-07

## 2021-02-28 RX ORDER — KETOROLAC TROMETHAMINE 30 MG/ML
30 INJECTION, SOLUTION INTRAMUSCULAR; INTRAVENOUS ONCE
Status: COMPLETED | OUTPATIENT
Start: 2021-02-28 | End: 2021-02-28

## 2021-02-28 RX ORDER — HYDROCODONE BITARTRATE AND ACETAMINOPHEN 5; 325 MG/1; MG/1
1-2 TABLET ORAL EVERY 6 HOURS PRN
Qty: 10 TABLET | Refills: 0 | Status: SHIPPED | OUTPATIENT
Start: 2021-02-28 | End: 2021-03-07

## 2021-02-28 NOTE — ED PROVIDER NOTES
Patient Seen in: [facility] Emergency Department      History   Patient presents with:  Abdomen/Flank Pain    Stated Complaint: abdomenal pain    HPI/Subjective:   HPI    76-year-old female with history of gallstones, here with complaints of acute and are negative. Positive for stated complaint: abdomenal pain  Other systems are as noted in HPI. Constitutional and vital signs reviewed. All other systems reviewed and negative except as noted above.     Physical Exam     ED Triage Vitals [02 Urine 6.0 5.0 - 8.0    Protein Urine Negative Negative mg/dL    Urobilinogen Urine <2.0 <2.0    Nitrite Urine Negative Negative    Leukocyte Esterase Urine Trace (A) Negative    WBC Urine 1-5 0 - 5 /HPF    RBC Urine None Seen 0 - 2 /HPF    Bacteria Urine N Basophil Absolute 0.09 0.00 - 0.20 x10(3) uL    Immature Granulocyte Absolute 0.03 0.00 - 1.00 x10(3) uL    Neutrophil % 44.3 %    Lymphocyte % 43.2 %    Monocyte % 9.3 %    Eosinophil % 2.1 %    Basophil % 0.8 %    Immature Granulocyte % 0.3 %   [facility] POCT pressure reading in the Emergency Department. They were informed of the dangers of undiagnosed and untreated hypertension.   Education regarding lifestyle modifications and the need for appropriate follow-up with their PCP to have their blood pressure re-c Refills: 0    !! - Potential duplicate medications found. Please discuss with provider.

## 2021-02-28 NOTE — ED INITIAL ASSESSMENT (HPI)
RUQ pain that started around 1230 tonight, denies n/v/d. Denies fevers, denies urinary symptoms. States she was here 2 months ago for similar, states it was gall stones. took norco around 0100 with no relief.

## 2021-03-12 ENCOUNTER — NURSE ONLY (OUTPATIENT)
Dept: INTERNAL MEDICINE CLINIC | Facility: CLINIC | Age: 51
End: 2021-03-12

## 2021-03-12 PROCEDURE — 96372 THER/PROPH/DIAG INJ SC/IM: CPT | Performed by: INTERNAL MEDICINE

## 2021-03-12 RX ADMIN — CYANOCOBALAMIN 1000 MCG: 1000 INJECTION INTRAMUSCULAR; SUBCUTANEOUS at 15:10:00

## 2021-03-17 ENCOUNTER — HOSPITAL ENCOUNTER (OUTPATIENT)
Age: 51
Discharge: HOME | End: 2021-03-17
Payer: COMMERCIAL

## 2021-03-17 VITALS
HEIGHT: 66 IN | DIASTOLIC BLOOD PRESSURE: 79 MMHG | RESPIRATION RATE: 18 BRPM | WEIGHT: 192 LBS | TEMPERATURE: 98 F | BODY MASS INDEX: 30.86 KG/M2 | HEART RATE: 77 BPM | SYSTOLIC BLOOD PRESSURE: 133 MMHG | OXYGEN SATURATION: 97 %

## 2021-03-17 DIAGNOSIS — J02.9 SORE THROAT: Primary | ICD-10-CM

## 2021-03-17 LAB — S PYO AG THROAT QL: NEGATIVE

## 2021-03-17 PROCEDURE — 99213 OFFICE O/P EST LOW 20 MIN: CPT | Performed by: PHYSICIAN ASSISTANT

## 2021-03-17 PROCEDURE — 87880 STREP A ASSAY W/OPTIC: CPT | Performed by: PHYSICIAN ASSISTANT

## 2021-03-25 ENCOUNTER — NURSE TRIAGE (OUTPATIENT)
Dept: INTERNAL MEDICINE CLINIC | Facility: CLINIC | Age: 51
End: 2021-03-25

## 2021-03-25 NOTE — TELEPHONE ENCOUNTER
Action Requested: Summary for Provider     []  Critical Lab, Recommendations Needed  [] Need Additional Advice  []   FYI    []   Need Orders  [] Need Medications Sent to Pharmacy  []  Other     SUMMARY: Pt c/o vaginal odor.  States a few night's ago she f Ranjan Meza is a 67 year old male presenting for   Chief Complaint   Patient presents with   • Derm Problem     AK to right hand     Denies Latex allergy or sensitivity.    Medication verified, no changes  Refills needed today: No

## 2021-03-26 ENCOUNTER — LAB ENCOUNTER (OUTPATIENT)
Dept: LAB | Age: 51
End: 2021-03-26
Attending: NURSE PRACTITIONER
Payer: COMMERCIAL

## 2021-03-26 ENCOUNTER — OFFICE VISIT (OUTPATIENT)
Dept: FAMILY MEDICINE CLINIC | Facility: CLINIC | Age: 51
End: 2021-03-26

## 2021-03-26 VITALS
HEART RATE: 98 BPM | WEIGHT: 196 LBS | DIASTOLIC BLOOD PRESSURE: 82 MMHG | SYSTOLIC BLOOD PRESSURE: 136 MMHG | HEIGHT: 66 IN | BODY MASS INDEX: 31.5 KG/M2

## 2021-03-26 DIAGNOSIS — Z11.3 SCREENING EXAMINATION FOR STD (SEXUALLY TRANSMITTED DISEASE): ICD-10-CM

## 2021-03-26 DIAGNOSIS — T18.9XXA INGESTION OF FOREIGN SUBSTANCE, INITIAL ENCOUNTER: ICD-10-CM

## 2021-03-26 DIAGNOSIS — Z11.3 SCREENING EXAMINATION FOR STD (SEXUALLY TRANSMITTED DISEASE): Primary | ICD-10-CM

## 2021-03-26 DIAGNOSIS — Z11.3 SCREENING EXAMINATION FOR VENEREAL DISEASE: Primary | ICD-10-CM
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
## 2021-03-26 PROCEDURE — 87491 CHLMYD TRACH DNA AMP PROBE: CPT

## 2021-03-26 PROCEDURE — 86695 HERPES SIMPLEX TYPE 1 TEST: CPT

## 2021-03-26 PROCEDURE — 3008F BODY MASS INDEX DOCD: CPT | Performed by: NURSE PRACTITIONER

## 2021-03-26 PROCEDURE — 87591 N.GONORRHOEAE DNA AMP PROB: CPT

## 2021-03-26 PROCEDURE — 3079F DIAST BP 80-89 MM HG: CPT | Performed by: NURSE PRACTITIONER

## 2021-03-26 PROCEDURE — 87389 HIV-1 AG W/HIV-1&-2 AB AG IA: CPT

## 2021-03-26 PROCEDURE — 36415 COLL VENOUS BLD VENIPUNCTURE: CPT

## 2021-03-26 PROCEDURE — 3075F SYST BP GE 130 - 139MM HG: CPT | Performed by: NURSE PRACTITIONER

## 2021-03-26 PROCEDURE — 99214 OFFICE O/P EST MOD 30 MIN: CPT | Performed by: NURSE PRACTITIONER

## 2021-03-26 PROCEDURE — 86780 TREPONEMA PALLIDUM: CPT

## 2021-03-26 PROCEDURE — 86696 HERPES SIMPLEX TYPE 2 TEST: CPT

## 2021-03-26 NOTE — PROGRESS NOTES
HPI  Pt presents for vaginal odor. She was at a party last Friday, started feeling funny and passed out. She states that she was very lethargic on Saturday and slept a lot of the day.   She left for a business trip on Sunday and then started to think that t 25.00        Pack years: 12.5        Types: Cigarettes        Quit date: 2016        Years since quittin.2      Smokeless tobacco: Former User    Vaping Use      Vaping Use: Former    Substance and Sexual Activity      Alcohol use:  Yes        Alco     Emotionally Abused:       Physically Abused:       Sexually Abused:     Current Outpatient Medications   Medication Sig Dispense Refill   • Cholecalciferol (VITAMIN D) 50 MCG (2000 UT) Oral Cap Take by mouth.          Allergies:    Entex

## 2021-03-27 RX ORDER — METRONIDAZOLE 500 MG/1
500 TABLET ORAL 2 TIMES DAILY
Qty: 14 TABLET | Refills: 0 | Status: SHIPPED | OUTPATIENT
Start: 2021-03-27 | End: 2021-04-03

## 2021-03-28 LAB
GENITAL VAGINOSIS SCREEN: POSITIVE
TRICHOMONAS SCREEN: NEGATIVE

## 2021-03-29 LAB
C TRACH DNA SPEC QL NAA+PROBE: NEGATIVE
HSV 1 GLYCOPROTEIN G, IGG: POSITIVE
HSV 2 GLYCOPROTEIN G, IGG: POSITIVE
N GONORRHOEA DNA SPEC QL NAA+PROBE: NEGATIVE
T PALLIDUM AB SER QL: NEGATIVE

## 2021-04-09 ENCOUNTER — NURSE ONLY (OUTPATIENT)
Dept: INTERNAL MEDICINE CLINIC | Facility: CLINIC | Age: 51
End: 2021-04-09

## 2021-04-09 DIAGNOSIS — E53.8 VITAMIN B 12 DEFICIENCY: Primary | ICD-10-CM

## 2021-04-09 PROCEDURE — 96372 THER/PROPH/DIAG INJ SC/IM: CPT | Performed by: INTERNAL MEDICINE

## 2021-04-09 RX ADMIN — CYANOCOBALAMIN 1000 MCG: 1000 INJECTION INTRAMUSCULAR; SUBCUTANEOUS at 15:10:00

## 2021-05-07 ENCOUNTER — NURSE ONLY (OUTPATIENT)
Dept: INTERNAL MEDICINE CLINIC | Facility: CLINIC | Age: 51
End: 2021-05-07

## 2021-05-07 DIAGNOSIS — E53.8 VITAMIN B 12 DEFICIENCY: Primary | ICD-10-CM

## 2021-05-07 PROCEDURE — 96372 THER/PROPH/DIAG INJ SC/IM: CPT | Performed by: INTERNAL MEDICINE

## 2021-05-07 RX ADMIN — CYANOCOBALAMIN 1000 MCG: 1000 INJECTION INTRAMUSCULAR; SUBCUTANEOUS at 15:10:00

## 2021-05-13 ENCOUNTER — OFFICE VISIT (OUTPATIENT)
Dept: INTERNAL MEDICINE CLINIC | Facility: CLINIC | Age: 51
End: 2021-05-13

## 2021-05-13 VITALS
WEIGHT: 195 LBS | HEIGHT: 66 IN | BODY MASS INDEX: 31.34 KG/M2 | SYSTOLIC BLOOD PRESSURE: 121 MMHG | DIASTOLIC BLOOD PRESSURE: 80 MMHG | HEART RATE: 86 BPM

## 2021-05-13 DIAGNOSIS — N76.1 SUBACUTE VAGINITIS: Primary | ICD-10-CM

## 2021-05-13 PROCEDURE — 3079F DIAST BP 80-89 MM HG: CPT | Performed by: INTERNAL MEDICINE

## 2021-05-13 PROCEDURE — 99214 OFFICE O/P EST MOD 30 MIN: CPT | Performed by: INTERNAL MEDICINE

## 2021-05-13 PROCEDURE — 3074F SYST BP LT 130 MM HG: CPT | Performed by: INTERNAL MEDICINE

## 2021-05-13 PROCEDURE — 3008F BODY MASS INDEX DOCD: CPT | Performed by: INTERNAL MEDICINE

## 2021-05-14 NOTE — PROGRESS NOTES
HPI:    Patient ID: Titus Grey is a 53year old female.     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  Negative for decreased urine volume, dysuria, flank pain, frequency, genital sores, pelvic pain, urge diagnosis)  Plan: GENITAL VAGINOSIS SCREEN, HCV ANTIBODY,         HEPATITIS B SURFACE ANTIGEN, GENITAL VAGINOSIS         SCREEN        Bacterial vaginosis traeatd  STD pre;cuaiton adivsed    Bacterial vaginosis treated  postiive herpes simplex IGG   No tiffanie

## 2021-05-19 ENCOUNTER — HOSPITAL ENCOUNTER (OUTPATIENT)
Age: 51
Discharge: HOME | End: 2021-05-19
Payer: COMMERCIAL

## 2021-05-19 VITALS
HEART RATE: 102 BPM | WEIGHT: 195 LBS | DIASTOLIC BLOOD PRESSURE: 82 MMHG | RESPIRATION RATE: 18 BRPM | BODY MASS INDEX: 31.34 KG/M2 | SYSTOLIC BLOOD PRESSURE: 123 MMHG | HEIGHT: 66 IN | OXYGEN SATURATION: 98 % | TEMPERATURE: 99 F

## 2021-05-19 DIAGNOSIS — J06.9 VIRAL UPPER RESPIRATORY TRACT INFECTION: ICD-10-CM

## 2021-05-19 DIAGNOSIS — R52 BODY ACHES: ICD-10-CM

## 2021-05-19 DIAGNOSIS — J01.10 ACUTE NON-RECURRENT FRONTAL SINUSITIS: Primary | ICD-10-CM

## 2021-05-19 DIAGNOSIS — H65.193 ACUTE MEE (MIDDLE EAR EFFUSION), BILATERAL: ICD-10-CM

## 2021-05-19 PROCEDURE — 99213 OFFICE O/P EST LOW 20 MIN: CPT | Performed by: EMERGENCY MEDICINE

## 2021-05-19 PROCEDURE — 87880 STREP A ASSAY W/OPTIC: CPT | Performed by: EMERGENCY MEDICINE

## 2021-05-19 PROCEDURE — U0002 COVID-19 LAB TEST NON-CDC: HCPCS | Performed by: EMERGENCY MEDICINE

## 2021-05-19 RX ORDER — AMOXICILLIN AND CLAVULANATE POTASSIUM 875; 125 MG/1; MG/1
1 TABLET, FILM COATED ORAL 2 TIMES DAILY
Qty: 20 TABLET | Refills: 0 | Status: SHIPPED | OUTPATIENT
Start: 2021-05-19 | End: 2021-05-29

## 2021-05-19 RX ORDER — FLUCONAZOLE 150 MG/1
TABLET ORAL
Qty: 2 TABLET | Refills: 0 | Status: SHIPPED | OUTPATIENT
Start: 2021-05-19

## 2021-05-19 RX ORDER — ALBUTEROL SULFATE 90 UG/1
AEROSOL, METERED RESPIRATORY (INHALATION)
Qty: 1 INHALER | Refills: 0 | Status: SHIPPED | OUTPATIENT
Start: 2021-05-19

## 2021-05-19 RX ORDER — PREDNISONE 20 MG/1
TABLET ORAL
Qty: 8 TABLET | Refills: 0 | Status: SHIPPED | OUTPATIENT
Start: 2021-05-19 | End: 2021-05-24

## 2021-05-19 NOTE — ED PROVIDER NOTES
Patient Seen in: [facility] [place]      History   Patient presents with:  Sore Throat    Stated Complaint: congested/sorethroat/fever/body aches    HPI/Subjective:     Chirag Carbajal is a 53year old female here for congestion, sneezing, sore th Vaping Use      Vaping Use: Former    Alcohol use: Yes      Alcohol/week: 2.0 standard drinks      Types: 2 Standard drinks or equivalent per week      Comment: social    Drug use:  No             Review of Systems    Positive for stated complaint: congeste range of motion. Cervical back: Normal range of motion. No rigidity or tenderness. Lymphadenopathy:      Cervical: No cervical adenopathy. Skin:     General: Skin is warm. Findings: No erythema or rash.    Neurological:      Mental Status: She help your symptoms especially the facial and ear pain. Do not take with NSAIDs at the same time please wait at least 1 hour before or after administration.      Ibuprofen/Motrin/Advil 600 mg every 6-8 hours as needed for pain, or Aleve/naproxen 500 mg ever second tab., Normal, Disp-2 tablet, R-0    predniSONE 20 MG Oral Tab  Take 2 tablets (40 mg total) by mouth daily for 3 days, THEN 1 tablet (20 mg total) daily for 2 days. , Normal, Disp-8 tablet, R-0    Albuterol Sulfate  (90 Base) MCG/ACT Inhalatio

## 2021-06-04 ENCOUNTER — NURSE ONLY (OUTPATIENT)
Dept: INTERNAL MEDICINE CLINIC | Facility: CLINIC | Age: 51
End: 2021-06-04

## 2021-06-04 ENCOUNTER — HOSPITAL ENCOUNTER (OUTPATIENT)
Age: 51
Discharge: HOME | End: 2021-06-04
Attending: PHYSICIAN ASSISTANT
Payer: COMMERCIAL

## 2021-06-04 VITALS
HEIGHT: 66 IN | DIASTOLIC BLOOD PRESSURE: 85 MMHG | HEART RATE: 87 BPM | BODY MASS INDEX: 30.86 KG/M2 | TEMPERATURE: 97 F | SYSTOLIC BLOOD PRESSURE: 125 MMHG | RESPIRATION RATE: 18 BRPM | OXYGEN SATURATION: 99 % | WEIGHT: 192 LBS

## 2021-06-04 DIAGNOSIS — E53.8 VITAMIN B 12 DEFICIENCY: Primary | ICD-10-CM

## 2021-06-04 DIAGNOSIS — J02.9 ACUTE PHARYNGITIS, UNSPECIFIED ETIOLOGY: Primary | ICD-10-CM

## 2021-06-04 PROCEDURE — 96372 THER/PROPH/DIAG INJ SC/IM: CPT | Performed by: INTERNAL MEDICINE

## 2021-06-04 PROCEDURE — 99213 OFFICE O/P EST LOW 20 MIN: CPT | Performed by: PHYSICIAN ASSISTANT

## 2021-06-04 PROCEDURE — 87880 STREP A ASSAY W/OPTIC: CPT | Performed by: PHYSICIAN ASSISTANT

## 2021-06-04 RX ORDER — CODEINE PHOSPHATE AND GUAIFENESIN 10; 100 MG/5ML; MG/5ML
SOLUTION ORAL EVERY 6 HOURS PRN
Qty: 75 ML | Refills: 0 | Status: SHIPPED | OUTPATIENT
Start: 2021-06-04

## 2021-06-04 RX ADMIN — CYANOCOBALAMIN 1000 MCG: 1000 INJECTION INTRAMUSCULAR; SUBCUTANEOUS at 09:54:00

## 2021-06-04 NOTE — ED PROVIDER NOTES
Patient Seen in: [facility] [place]    History   Patient presents with:  Sore Throat    Stated Complaint: strep? HPI    60-year-old female presents with chief complaint of sore throat. Onset 5 days ago. Patient denies sick contacts.   Pain scale 2016        Years since quittin.3      Smokeless tobacco: Former User    Vaping Use      Vaping Use: Former    Alcohol use:  Yes      Alcohol/week: 2.0 standard drinks      Types: 2 Standard drinks or equivalent per week      Comment: social    Michael rebound tenderness or guarding. No organomegaly is noted. No peritoneal signs. Genitourinary: Not examined. Lymphatic: No gross lymphadenopathy noted. Musculoskeletal: Musculoskeletal system is grossly intact. There is no obvious deformity.   Neurologi

## 2021-07-02 ENCOUNTER — NURSE ONLY (OUTPATIENT)
Dept: INTERNAL MEDICINE CLINIC | Facility: CLINIC | Age: 51
End: 2021-07-02

## 2021-07-02 DIAGNOSIS — E53.8 VITAMIN B 12 DEFICIENCY: Primary | ICD-10-CM

## 2021-07-02 PROCEDURE — 96372 THER/PROPH/DIAG INJ SC/IM: CPT | Performed by: INTERNAL MEDICINE

## 2021-07-02 RX ADMIN — CYANOCOBALAMIN 1000 MCG: 1000 INJECTION INTRAMUSCULAR; SUBCUTANEOUS at 13:06:00

## 2021-07-19 ENCOUNTER — TELEPHONE (OUTPATIENT)
Dept: PULMONOLOGY | Facility: CLINIC | Age: 51
End: 2021-07-19

## 2021-07-30 ENCOUNTER — NURSE ONLY (OUTPATIENT)
Dept: INTERNAL MEDICINE CLINIC | Facility: CLINIC | Age: 51
End: 2021-07-30

## 2021-07-30 DIAGNOSIS — E53.8 VITAMIN B 12 DEFICIENCY: Primary | ICD-10-CM

## 2021-07-30 PROCEDURE — 96372 THER/PROPH/DIAG INJ SC/IM: CPT | Performed by: INTERNAL MEDICINE

## 2021-07-30 RX ADMIN — CYANOCOBALAMIN 1000 MCG: 1000 INJECTION INTRAMUSCULAR; SUBCUTANEOUS at 15:05:00

## 2021-08-16 ENCOUNTER — TELEPHONE (OUTPATIENT)
Dept: INTERNAL MEDICINE CLINIC | Facility: CLINIC | Age: 51
End: 2021-08-16

## 2021-08-17 NOTE — TELEPHONE ENCOUNTER
Good Morning Dr Kaiser Pi,    Please see message below and generate referral for Pulmo if you agree with patient plan of care.     Thank you,  [facility]

## 2021-08-23 ENCOUNTER — TELEPHONE (OUTPATIENT)
Dept: PULMONOLOGY | Facility: CLINIC | Age: 51
End: 2021-08-23

## 2021-08-23 DIAGNOSIS — R91.1 LUNG NODULE: Primary | ICD-10-CM

## 2021-08-23 NOTE — TELEPHONE ENCOUNTER
Patient has appointment on 9/9, patient requesting order for CT chest, please call at [phone] or can send [employer] message, thanks.

## 2021-08-23 NOTE — TELEPHONE ENCOUNTER
Dr. Jace Petty, patient hasn't been seen since  but requesting CT order before appt 21. Do you want to order or wait for appt? CT chest order .

## 2021-08-24 NOTE — TELEPHONE ENCOUNTER
We will have the patient be seen in the office first to discuss further but in the meantime I have placed order for new CT chest which I recommend her having before she comes to the office.

## 2021-08-25 NOTE — TELEPHONE ENCOUNTER
Discussed below including Dr. Zafar Devoid orders w/ pt. Instructed her to contact [facility] at #[phone] to ensure that authorization obtained for CT & to call Central Scheduling at #[phone] to schedule CT.  Confirmed she has appt on 9/3 9:30 am L

## 2021-08-27 ENCOUNTER — NURSE ONLY (OUTPATIENT)
Dept: INTERNAL MEDICINE CLINIC | Facility: CLINIC | Age: 51
End: 2021-08-27

## 2021-08-27 DIAGNOSIS — E53.8 VITAMIN B 12 DEFICIENCY: Primary | ICD-10-CM

## 2021-08-27 PROCEDURE — 96372 THER/PROPH/DIAG INJ SC/IM: CPT | Performed by: INTERNAL MEDICINE

## 2021-08-27 RX ADMIN — CYANOCOBALAMIN 1000 MCG: 1000 INJECTION INTRAMUSCULAR; SUBCUTANEOUS at 14:43:00

## 2021-09-01 ENCOUNTER — HOSPITAL ENCOUNTER (OUTPATIENT)
Dept: CT IMAGING | Facility: HOSPITAL | Age: 51
Discharge: HOME | End: 2021-09-01
Attending: INTERNAL MEDICINE
Payer: COMMERCIAL

## 2021-09-01 DIAGNOSIS — R91.1 LUNG NODULE: ICD-10-CM

## 2021-09-01 PROCEDURE — 71250 CT THORAX DX C-: CPT | Performed by: INTERNAL MEDICINE

## 2021-09-03 ENCOUNTER — OFFICE VISIT (OUTPATIENT)
Dept: PULMONOLOGY | Facility: CLINIC | Age: 51
End: 2021-09-03

## 2021-09-03 VITALS
SYSTOLIC BLOOD PRESSURE: 130 MMHG | DIASTOLIC BLOOD PRESSURE: 87 MMHG | HEART RATE: 77 BPM | WEIGHT: 196 LBS | BODY MASS INDEX: 31.5 KG/M2 | RESPIRATION RATE: 14 BRPM | HEIGHT: 66 IN | OXYGEN SATURATION: 96 %

## 2021-09-03 DIAGNOSIS — R91.1 LUNG NODULE: Primary | ICD-10-CM

## 2021-09-03 PROCEDURE — 99213 OFFICE O/P EST LOW 20 MIN: CPT | Performed by: INTERNAL MEDICINE

## 2021-09-03 PROCEDURE — 3008F BODY MASS INDEX DOCD: CPT | Performed by: INTERNAL MEDICINE

## 2021-09-03 PROCEDURE — 3075F SYST BP GE 130 - 139MM HG: CPT | Performed by: INTERNAL MEDICINE

## 2021-09-03 PROCEDURE — 3079F DIAST BP 80-89 MM HG: CPT | Performed by: INTERNAL MEDICINE

## 2021-09-03 NOTE — PROGRESS NOTES
Referring Physician  Tamiko Cummings    History of Present Illness  Patient presents today for follow-up visit to pulmonary clinic. Denies significant dyspnea symptoms. Denies chronic cough, wheezing. Had recent CT chest performed.     Medications  guaiFE presents today for follow-up visit to pulmonary clinic to monitor his lung nodules. Most recent CT chest reviewed from 9/1/2021 with stable appearance of lung nodules with the largest nodule measuring 6 mm in the left lower lobe. Unchanged from 2017.   Gi

## 2021-09-25 ENCOUNTER — NURSE ONLY (OUTPATIENT)
Dept: INTERNAL MEDICINE CLINIC | Facility: CLINIC | Age: 51
End: 2021-09-25

## 2021-09-25 DIAGNOSIS — E53.8 VITAMIN B 12 DEFICIENCY: Primary | ICD-10-CM

## 2021-09-25 PROCEDURE — 96372 THER/PROPH/DIAG INJ SC/IM: CPT | Performed by: INTERNAL MEDICINE

## 2021-09-25 RX ADMIN — CYANOCOBALAMIN 1000 MCG: 1000 INJECTION INTRAMUSCULAR; SUBCUTANEOUS at 11:31:00

## 2021-10-29 ENCOUNTER — NURSE ONLY (OUTPATIENT)
Dept: INTERNAL MEDICINE CLINIC | Facility: CLINIC | Age: 51
End: 2021-10-29

## 2021-10-29 DIAGNOSIS — E53.8 VITAMIN B 12 DEFICIENCY: Primary | ICD-10-CM

## 2021-10-29 PROCEDURE — 96372 THER/PROPH/DIAG INJ SC/IM: CPT | Performed by: INTERNAL MEDICINE

## 2021-10-29 RX ADMIN — CYANOCOBALAMIN 1000 MCG: 1000 INJECTION INTRAMUSCULAR; SUBCUTANEOUS at 14:37:00

## 2021-10-29 NOTE — PROGRESS NOTES
Patient is here for B12 injection. Order is in [address] [place] [address] Rd, patient verified name and . Injection given patient tolerated well.

## 2021-11-21 ENCOUNTER — HOSPITAL ENCOUNTER (EMERGENCY)
Facility: HOSPITAL | Age: 51
Discharge: HOME | End: 2021-11-21
Attending: EMERGENCY MEDICINE
Payer: COMMERCIAL

## 2021-11-21 ENCOUNTER — APPOINTMENT (OUTPATIENT)
Dept: ULTRASOUND IMAGING | Facility: HOSPITAL | Age: 51
End: 2021-11-21
Attending: EMERGENCY MEDICINE
Payer: COMMERCIAL

## 2021-11-21 VITALS
BODY MASS INDEX: 30.86 KG/M2 | HEIGHT: 66 IN | SYSTOLIC BLOOD PRESSURE: 141 MMHG | DIASTOLIC BLOOD PRESSURE: 84 MMHG | RESPIRATION RATE: 20 BRPM | TEMPERATURE: 98 F | WEIGHT: 192 LBS | HEART RATE: 76 BPM | OXYGEN SATURATION: 96 %

## 2021-11-21 DIAGNOSIS — R10.11 ABDOMINAL PAIN, RIGHT UPPER QUADRANT: Primary | ICD-10-CM

## 2021-11-21 DIAGNOSIS — K80.20 CALCULUS OF GALLBLADDER WITHOUT CHOLECYSTITIS WITHOUT OBSTRUCTION: ICD-10-CM

## 2021-11-21 PROCEDURE — 99284 EMERGENCY DEPT VISIT MOD MDM: CPT

## 2021-11-21 PROCEDURE — 85025 COMPLETE CBC W/AUTO DIFF WBC: CPT | Performed by: EMERGENCY MEDICINE

## 2021-11-21 PROCEDURE — 36415 COLL VENOUS BLD VENIPUNCTURE: CPT

## 2021-11-21 PROCEDURE — 83690 ASSAY OF LIPASE: CPT | Performed by: EMERGENCY MEDICINE

## 2021-11-21 PROCEDURE — 80076 HEPATIC FUNCTION PANEL: CPT | Performed by: EMERGENCY MEDICINE

## 2021-11-21 PROCEDURE — 80048 BASIC METABOLIC PNL TOTAL CA: CPT | Performed by: EMERGENCY MEDICINE

## 2021-11-21 PROCEDURE — 76705 ECHO EXAM OF ABDOMEN: CPT | Performed by: EMERGENCY MEDICINE

## 2021-11-21 NOTE — ED INITIAL ASSESSMENT (HPI)
Pt to the ed for complaints of right flank pain starting at 3am. States she also has associated nausea. No fevers or diarrhea noted.  Pt reports having pain associated with her gallbladder in the past. Pt states that she ate a late dinner which may have tri

## 2021-11-21 NOTE — ED PROVIDER NOTES
Patient Seen in: [facility] [place] [facility] AND [facility] Emergency Department      History   Patient presents with:  Abdomen/Flank Pain    Stated Complaint: R flank pain    Subjective:   HPI    49year old female with a past medical history of endometriosis, Peña lymphoma complaint: R flank pain  Other systems are as noted in HPI. Constitutional and vital signs reviewed. All other systems reviewed and negative except as noted above.     Physical Exam     ED Triage Vitals [11/21/21 0602]   BP (!) 138/99   Pulse 90   Res PANEL (8) - Abnormal; Notable for the following components:       Result Value    Creatinine 1.10 (*)     BUN/CREA Ratio 7.3 (*)     Calcium, Total 10.7 (*)     GFR, Non- 59 (*)     All other components within normal limits   CBC W/ DIFFERE understanding, would like referral to general surgeon but otherwise prefers to go home at this time.           Disposition and Plan     Clinical Impression:  Abdominal pain, right upper quadrant  (primary encounter diagnosis)  Calculus of gallbladder withou

## 2021-11-26 ENCOUNTER — NURSE ONLY (OUTPATIENT)
Dept: INTERNAL MEDICINE CLINIC | Facility: CLINIC | Age: 51
End: 2021-11-26

## 2021-11-26 DIAGNOSIS — E53.8 VITAMIN B 12 DEFICIENCY: Primary | ICD-10-CM

## 2021-11-26 PROCEDURE — 96372 THER/PROPH/DIAG INJ SC/IM: CPT | Performed by: INTERNAL MEDICINE

## 2021-11-26 RX ADMIN — CYANOCOBALAMIN 1000 MCG: 1000 INJECTION INTRAMUSCULAR; SUBCUTANEOUS at 14:53:00

## 2021-12-02 ENCOUNTER — HOSPITAL ENCOUNTER (OUTPATIENT)
Age: 51
Discharge: HOME | End: 2021-12-02
Payer: COMMERCIAL

## 2021-12-02 VITALS
WEIGHT: 192 LBS | HEART RATE: 95 BPM | OXYGEN SATURATION: 98 % | SYSTOLIC BLOOD PRESSURE: 132 MMHG | DIASTOLIC BLOOD PRESSURE: 83 MMHG | BODY MASS INDEX: 30.86 KG/M2 | HEIGHT: 66 IN | TEMPERATURE: 99 F | RESPIRATION RATE: 16 BRPM

## 2021-12-02 DIAGNOSIS — Z20.822 ENCOUNTER FOR LABORATORY TESTING FOR COVID-19 VIRUS: Primary | ICD-10-CM

## 2021-12-02 DIAGNOSIS — J06.9 UPPER RESPIRATORY INFECTION: ICD-10-CM

## 2021-12-02 DIAGNOSIS — J11.1 INFLUENZA: ICD-10-CM

## 2021-12-02 PROCEDURE — 99213 OFFICE O/P EST LOW 20 MIN: CPT | Performed by: NURSE PRACTITIONER

## 2021-12-02 PROCEDURE — 87502 INFLUENZA DNA AMP PROBE: CPT | Performed by: NURSE PRACTITIONER

## 2021-12-02 PROCEDURE — U0002 COVID-19 LAB TEST NON-CDC: HCPCS | Performed by: NURSE PRACTITIONER

## 2021-12-02 PROCEDURE — 87880 STREP A ASSAY W/OPTIC: CPT | Performed by: NURSE PRACTITIONER

## 2021-12-02 RX ORDER — BENZONATATE 100 MG/1
100 CAPSULE ORAL 3 TIMES DAILY PRN
Qty: 30 CAPSULE | Refills: 0 | Status: SHIPPED | OUTPATIENT
Start: 2021-12-02 | End: 2022-01-01

## 2021-12-02 NOTE — ED INITIAL ASSESSMENT (HPI)
Pt presents to the IC with c/o a cough, nasal congestion, chills, low grade fever and sore throat for the last few days.

## 2021-12-02 NOTE — ED PROVIDER NOTES
Patient Seen in: [facility]    History   Patient presents with:  Cough/URI    Stated Complaint: flu??    HPI    Sunil Gomez is a 49year old female who presents to immediate care requesting testing for COVID-19.  Congestion, cough, head Smoker        Packs/day: 0.50        Years: 25.00        Pack years: 12.5        Types: Cigarettes        Quit date: 2016        Years since quittin.8      Smokeless tobacco: Former User    Vaping Use      Vaping Use: Former    Alcohol use:  Yes nontender, nondistended. There is no rebound tenderness or guarding. No organomegaly is noted. No peritoneal signs. Genitourinary: Not examined. Lymphatic: No gross lymphadenopathy noted. Musculoskeletal: Musculoskeletal system is grossly intact.   The

## 2021-12-23 ENCOUNTER — HOSPITAL ENCOUNTER (OUTPATIENT)
Dept: GENERAL RADIOLOGY | Age: 51
Discharge: HOME | End: 2021-12-23
Attending: INTERNAL MEDICINE
Payer: COMMERCIAL

## 2021-12-23 ENCOUNTER — OFFICE VISIT (OUTPATIENT)
Dept: INTERNAL MEDICINE CLINIC | Facility: CLINIC | Age: 51
End: 2021-12-23

## 2021-12-23 VITALS
WEIGHT: 192 LBS | HEIGHT: 66 IN | SYSTOLIC BLOOD PRESSURE: 136 MMHG | BODY MASS INDEX: 30.86 KG/M2 | DIASTOLIC BLOOD PRESSURE: 89 MMHG | HEART RATE: 85 BPM

## 2021-12-23 DIAGNOSIS — Z00.00 ROUTINE GENERAL MEDICAL EXAMINATION AT A HEALTH CARE FACILITY: ICD-10-CM

## 2021-12-23 DIAGNOSIS — Z85.71 H/O HODGKIN'S LYMPHOMA: ICD-10-CM

## 2021-12-23 DIAGNOSIS — Z72.0 TOBACCO ABUSE: ICD-10-CM

## 2021-12-23 DIAGNOSIS — M54.2 CERVICALGIA: ICD-10-CM

## 2021-12-23 DIAGNOSIS — M54.2 CERVICALGIA: Primary | ICD-10-CM

## 2021-12-23 DIAGNOSIS — E53.8 VITAMIN B 12 DEFICIENCY: ICD-10-CM

## 2021-12-23 PROCEDURE — 3075F SYST BP GE 130 - 139MM HG: CPT | Performed by: INTERNAL MEDICINE

## 2021-12-23 PROCEDURE — 96372 THER/PROPH/DIAG INJ SC/IM: CPT | Performed by: INTERNAL MEDICINE

## 2021-12-23 PROCEDURE — 99214 OFFICE O/P EST MOD 30 MIN: CPT | Performed by: INTERNAL MEDICINE

## 2021-12-23 PROCEDURE — 3079F DIAST BP 80-89 MM HG: CPT | Performed by: INTERNAL MEDICINE

## 2021-12-23 PROCEDURE — 72040 X-RAY EXAM NECK SPINE 2-3 VW: CPT | Performed by: INTERNAL MEDICINE

## 2021-12-23 PROCEDURE — 3008F BODY MASS INDEX DOCD: CPT | Performed by: INTERNAL MEDICINE

## 2021-12-23 RX ORDER — CYCLOBENZAPRINE HCL 10 MG
10 TABLET ORAL NIGHTLY PRN
Qty: 30 TABLET | Refills: 1 | Status: SHIPPED | OUTPATIENT
Start: 2021-12-23 | End: 2022-01-12

## 2021-12-23 RX ORDER — CYANOCOBALAMIN 1000 UG/ML
1000 INJECTION INTRAMUSCULAR; SUBCUTANEOUS
Status: SHIPPED | OUTPATIENT
Start: 2021-12-23 | End: 2022-12-18

## 2021-12-23 RX ORDER — METHYLPREDNISOLONE 4 MG/1
TABLET ORAL
Qty: 1 EACH | Refills: 0 | Status: SHIPPED | OUTPATIENT
Start: 2021-12-23

## 2021-12-23 RX ADMIN — CYANOCOBALAMIN 1000 MCG: 1000 INJECTION INTRAMUSCULAR; SUBCUTANEOUS at 12:10:00

## 2021-12-23 NOTE — PROGRESS NOTES
HPI:    Patient ID: Mayur Guy is a 49year old female.     HPI    Hx of lymphnoma in remission close follow up with consultatnt  Chronic left sided neck pain radiating to left shoulder    Pain more than a year  Now  Persistent  achines    Pain mode return in 72 hours take second tab. (Patient not taking: Reported on 12/23/2021) 2 tablet 0   • Albuterol Sulfate  (90 Base) MCG/ACT Inhalation Aero Soln Inhale 1 puff and hold breath for 10 seconds then release.  Wait one full minute then repeat for and tenderness present. No swelling, rigidity or bony tenderness. No pain with movement. Normal range of motion. Lumbar back: No tenderness.               ASSESSMENT/PLAN:   (M54.2) Cervicalgia  (primary encounter diagnosis)  Plan: XR CERVICAL SPINE (2

## 2022-01-02 ENCOUNTER — HOSPITAL ENCOUNTER (OUTPATIENT)
Age: 52
Discharge: HOME | End: 2022-01-02
Payer: COMMERCIAL

## 2022-01-02 VITALS
RESPIRATION RATE: 18 BRPM | DIASTOLIC BLOOD PRESSURE: 86 MMHG | HEART RATE: 90 BPM | OXYGEN SATURATION: 97 % | TEMPERATURE: 98 F | SYSTOLIC BLOOD PRESSURE: 127 MMHG

## 2022-01-02 DIAGNOSIS — J32.1 FRONTAL SINUSITIS, UNSPECIFIED CHRONICITY: Primary | ICD-10-CM

## 2022-01-02 PROCEDURE — 99213 OFFICE O/P EST LOW 20 MIN: CPT | Performed by: NURSE PRACTITIONER

## 2022-01-02 RX ORDER — AMOXICILLIN 875 MG/1
875 TABLET, COATED ORAL 2 TIMES DAILY
Qty: 20 TABLET | Refills: 0 | Status: SHIPPED | OUTPATIENT
Start: 2022-01-02 | End: 2022-01-12

## 2022-01-02 NOTE — ED INITIAL ASSESSMENT (HPI)
Pt came in due to runny nose and congestion and earache for the past 2 weeks. Pt denies any sob, cp, nvd, or any dizziness. Pt has easy non labored respirations. Pt is fully verbal and ambulatory.

## 2022-01-02 NOTE — ED PROVIDER NOTES
Patient Seen in: [facility]      History   Patient presents with:  Runny Nose    Stated Complaint: EARS CLOGGED, SINUS PRESSURE X 3 WEEKS    Subjective:   HPI    54yr old female here today with c/o \" sinus infection\"  Pt reports that she ha nondistended. Active bowel sounds present. Back: No CVA tenderness. Extremities: No edema. Pulses 2+ extremities. Brisk capillary refill noted. Skin: Normal skin turgor     CNS: Moves all 4 extremities. Interacts appropriately.   No trem

## 2022-01-24 ENCOUNTER — HOSPITAL ENCOUNTER (EMERGENCY)
Facility: HOSPITAL | Age: 52
Discharge: HOME | End: 2022-01-24
Attending: EMERGENCY MEDICINE
Payer: COMMERCIAL

## 2022-01-24 ENCOUNTER — APPOINTMENT (OUTPATIENT)
Dept: GENERAL RADIOLOGY | Facility: HOSPITAL | Age: 52
End: 2022-01-24
Attending: EMERGENCY MEDICINE
Payer: COMMERCIAL

## 2022-01-24 VITALS
BODY MASS INDEX: 32 KG/M2 | WEIGHT: 198 LBS | OXYGEN SATURATION: 97 % | RESPIRATION RATE: 21 BRPM | HEART RATE: 78 BPM | TEMPERATURE: 98 F | DIASTOLIC BLOOD PRESSURE: 82 MMHG | SYSTOLIC BLOOD PRESSURE: 113 MMHG

## 2022-01-24 DIAGNOSIS — R07.9 CHEST PAIN: ICD-10-CM

## 2022-01-24 DIAGNOSIS — R06.00 DYSPNEA, UNSPECIFIED TYPE: ICD-10-CM

## 2022-01-24 DIAGNOSIS — R00.2 PALPITATIONS: Primary | ICD-10-CM

## 2022-01-24 LAB
ANION GAP SERPL CALC-SCNC: 6 MMOL/L (ref 0–18)
BASOPHILS # BLD AUTO: 0.1 X10(3) UL (ref 0–0.2)
BASOPHILS NFR BLD AUTO: 0.9 %
BUN BLD-MCNC: 9 MG/DL (ref 7–18)
BUN/CREAT SERPL: 9.7 (ref 10–20)
CALCIUM BLD-MCNC: 9.3 MG/DL (ref 8.5–10.1)
CHLORIDE SERPL-SCNC: 110 MMOL/L (ref 98–112)
CO2 SERPL-SCNC: 25 MMOL/L (ref 21–32)
CREAT BLD-MCNC: 0.93 MG/DL
D DIMER PPP FEU-MCNC: 0.27 UG/ML FEU (ref ?–0.5)
DEPRECATED RDW RBC AUTO: 40.2 FL (ref 35.1–46.3)
EOSINOPHIL # BLD AUTO: 0.27 X10(3) UL (ref 0–0.7)
EOSINOPHIL NFR BLD AUTO: 2.4 %
ERYTHROCYTE [DISTWIDTH] IN BLOOD BY AUTOMATED COUNT: 12.3 % (ref 11–15)
GLUCOSE BLD-MCNC: 89 MG/DL (ref 70–99)
HCT VFR BLD AUTO: 44.8 %
HGB BLD-MCNC: 14.9 G/DL
IMM GRANULOCYTES # BLD AUTO: 0.05 X10(3) UL (ref 0–1)
IMM GRANULOCYTES NFR BLD: 0.4 %
LYMPHOCYTES # BLD AUTO: 4.54 X10(3) UL (ref 1–4)
LYMPHOCYTES NFR BLD AUTO: 40.6 %
MCH RBC QN AUTO: 29.5 PG (ref 26–34)
MCHC RBC AUTO-ENTMCNC: 33.3 G/DL (ref 31–37)
MCV RBC AUTO: 88.7 FL
MONOCYTES # BLD AUTO: 0.84 X10(3) UL (ref 0.1–1)
MONOCYTES NFR BLD AUTO: 7.5 %
NEUTROPHILS # BLD AUTO: 5.39 X10 (3) UL (ref 1.5–7.7)
NEUTROPHILS # BLD AUTO: 5.39 X10(3) UL (ref 1.5–7.7)
NEUTROPHILS NFR BLD AUTO: 48.2 %
OSMOLALITY SERPL CALC.SUM OF ELEC: 290 MOSM/KG (ref 275–295)
PLATELET # BLD AUTO: 351 10(3)UL (ref 150–450)
POTASSIUM SERPL-SCNC: 3.8 MMOL/L (ref 3.5–5.1)
RBC # BLD AUTO: 5.05 X10(6)UL
SODIUM SERPL-SCNC: 141 MMOL/L (ref 136–145)
TROPONIN I HIGH SENSITIVITY: 3 NG/L
TSI SER-ACNC: 2.21 MIU/ML (ref 0.36–3.74)
WBC # BLD AUTO: 11.2 X10(3) UL (ref 4–11)

## 2022-01-24 PROCEDURE — 93005 ELECTROCARDIOGRAM TRACING: CPT

## 2022-01-24 PROCEDURE — 85379 FIBRIN DEGRADATION QUANT: CPT | Performed by: EMERGENCY MEDICINE

## 2022-01-24 PROCEDURE — 99284 EMERGENCY DEPT VISIT MOD MDM: CPT

## 2022-01-24 PROCEDURE — 36415 COLL VENOUS BLD VENIPUNCTURE: CPT

## 2022-01-24 PROCEDURE — 84443 ASSAY THYROID STIM HORMONE: CPT | Performed by: EMERGENCY MEDICINE

## 2022-01-24 PROCEDURE — 84484 ASSAY OF TROPONIN QUANT: CPT | Performed by: EMERGENCY MEDICINE

## 2022-01-24 PROCEDURE — 85025 COMPLETE CBC W/AUTO DIFF WBC: CPT | Performed by: EMERGENCY MEDICINE

## 2022-01-24 PROCEDURE — 93010 ELECTROCARDIOGRAM REPORT: CPT | Performed by: EMERGENCY MEDICINE

## 2022-01-24 PROCEDURE — 71045 X-RAY EXAM CHEST 1 VIEW: CPT | Performed by: EMERGENCY MEDICINE

## 2022-01-24 PROCEDURE — 80048 BASIC METABOLIC PNL TOTAL CA: CPT | Performed by: EMERGENCY MEDICINE

## 2022-01-24 RX ORDER — MAGNESIUM OXIDE 400 MG (241.3 MG MAGNESIUM) TABLET
400 TABLET DAILY
Qty: 30 TABLET | Refills: 0 | Status: SHIPPED | OUTPATIENT
Start: 2022-01-24 | End: 2022-02-23

## 2022-01-25 NOTE — ED INITIAL ASSESSMENT (HPI)
Pt c/o chest pain, and palpitations, left arm pain x a few weeks.  States the pain and palpitations have progressively became worse. +shortness of breath. + cough

## 2022-01-25 NOTE — CM/SW NOTE
[facility] request to f/u with holter monitor for patient. Patient has an appt scheduled for:    1/26/22 at 9:00am    Appt made by patient.

## 2022-01-25 NOTE — CM/SW NOTE
Called by Dr. Aramis Tipton - Dr. Aramis Tipton requests [facility]. ensure patient gets 3-7 day holter monitor placed.  Dr. Aramis Tipton provided patient with PH# to call and schedule holter monitor, Dr. Aramis Tipton would like for [facility]. to follow-up tomorrow AM to ensure patient gets scheduled fo

## 2022-01-25 NOTE — ED PROVIDER NOTES
Patient Seen in: [facility] AND [facility] Emergency Department    History   Patient presents with:  Chest Pain Angina    Stated Complaint: chest pain    HPI    Patient complains of palpitations on and off for ovver a week.   Lasting short while few minutes, jeffry Smoking status: Former Smoker        Packs/day: 0.50        Years: 25.00        Pack years: 12.5        Types: Cigarettes        Quit date: 2016        Years since quittin.0      Smokeless tobacco: Never Used    Vaping Use      Vaping Use: Former components:    WBC 11.2 (*)     Lymphocyte Absolute 4.54 (*)     All other components within normal limits   D-DIMER - Normal   TSH W REFLEX TO FREE T4 - Normal   TROPONIN I HIGH SENSITIVITY - Normal   CBC WITH DIFFERENTIAL WITH PLATELET    Narrative: Disposition and Plan     Clinical Impression:  Palpitations  (primary encounter diagnosis)  Dyspnea, unspecified type  Chest pain    Disposition:  Discharge    Follow-up:  Nneka Fowler  [facility] [address] [facility]

## 2022-01-26 ENCOUNTER — HOSPITAL ENCOUNTER (OUTPATIENT)
Dept: CV DIAGNOSTICS | Facility: HOSPITAL | Age: 52
Discharge: HOME | End: 2022-01-26
Attending: EMERGENCY MEDICINE
Payer: COMMERCIAL

## 2022-01-26 DIAGNOSIS — R00.2 PALPITATIONS: ICD-10-CM

## 2022-01-26 DIAGNOSIS — R07.9 CHEST PAIN: ICD-10-CM

## 2022-01-26 PROCEDURE — 93242 EXT ECG>48HR<7D RECORDING: CPT | Performed by: EMERGENCY MEDICINE

## 2022-01-26 PROCEDURE — 93243 EXT ECG>48HR<7D SCAN A/R: CPT | Performed by: EMERGENCY MEDICINE

## 2022-06-09 ENCOUNTER — HOSPITAL ENCOUNTER (OUTPATIENT)
Age: 52
Discharge: HOME | End: 2022-06-09
Payer: COMMERCIAL

## 2022-06-09 VITALS
BODY MASS INDEX: 30.37 KG/M2 | SYSTOLIC BLOOD PRESSURE: 133 MMHG | DIASTOLIC BLOOD PRESSURE: 84 MMHG | RESPIRATION RATE: 20 BRPM | HEART RATE: 101 BPM | TEMPERATURE: 97 F | WEIGHT: 189 LBS | HEIGHT: 66 IN | OXYGEN SATURATION: 97 %

## 2022-06-09 DIAGNOSIS — H66.90 ACUTE OTITIS MEDIA, UNSPECIFIED OTITIS MEDIA TYPE: ICD-10-CM

## 2022-06-09 DIAGNOSIS — R05.9 COUGH: Primary | ICD-10-CM

## 2022-06-09 DIAGNOSIS — J01.90 ACUTE SINUSITIS, RECURRENCE NOT SPECIFIED, UNSPECIFIED LOCATION: ICD-10-CM

## 2022-06-09 LAB — SARS-COV-2 RNA RESP QL NAA+PROBE: NOT DETECTED

## 2022-06-09 RX ORDER — ALBUTEROL SULFATE 90 UG/1
2 AEROSOL, METERED RESPIRATORY (INHALATION) EVERY 4 HOURS PRN
Qty: 1 EACH | Refills: 0 | Status: SHIPPED | OUTPATIENT
Start: 2022-06-09 | End: 2022-07-09

## 2022-06-09 RX ORDER — FLUCONAZOLE 150 MG/1
150 TABLET ORAL ONCE
Qty: 1 TABLET | Refills: 0 | Status: SHIPPED | OUTPATIENT
Start: 2022-06-09 | End: 2022-06-09

## 2022-06-09 RX ORDER — BENZONATATE 100 MG/1
200 CAPSULE ORAL 3 TIMES DAILY PRN
Qty: 30 CAPSULE | Refills: 0 | Status: SHIPPED | OUTPATIENT
Start: 2022-06-09 | End: 2022-07-09

## 2022-06-09 RX ORDER — AMOXICILLIN AND CLAVULANATE POTASSIUM 875; 125 MG/1; MG/1
1 TABLET, FILM COATED ORAL 2 TIMES DAILY
Qty: 20 TABLET | Refills: 0 | Status: SHIPPED | OUTPATIENT
Start: 2022-06-09 | End: 2022-06-19

## 2022-06-16 ENCOUNTER — APPOINTMENT (OUTPATIENT)
Dept: GENERAL RADIOLOGY | Age: 52
End: 2022-06-16
Attending: NURSE PRACTITIONER
Payer: COMMERCIAL

## 2022-06-16 ENCOUNTER — HOSPITAL ENCOUNTER (OUTPATIENT)
Age: 52
Discharge: HOME | End: 2022-06-16
Payer: COMMERCIAL

## 2022-06-16 VITALS
SYSTOLIC BLOOD PRESSURE: 118 MMHG | TEMPERATURE: 100 F | WEIGHT: 187 LBS | HEART RATE: 112 BPM | HEIGHT: 66 IN | OXYGEN SATURATION: 97 % | RESPIRATION RATE: 18 BRPM | DIASTOLIC BLOOD PRESSURE: 80 MMHG | BODY MASS INDEX: 30.05 KG/M2

## 2022-06-16 DIAGNOSIS — U07.1 COVID-19: Primary | ICD-10-CM

## 2022-06-16 LAB
#MXD IC: 0.9 X10ˆ3/UL (ref 0.1–1)
BUN BLD-MCNC: 9 MG/DL (ref 7–18)
CHLORIDE BLD-SCNC: 103 MMOL/L (ref 98–112)
CO2 BLD-SCNC: 26 MMOL/L (ref 21–32)
CREAT BLD-MCNC: 1 MG/DL
DDIMER WHOLE BLOOD: <200 NG/ML DDU (ref ?–400)
GLUCOSE BLD-MCNC: 93 MG/DL (ref 70–99)
HCT VFR BLD AUTO: 44.7 %
HCT VFR BLD CALC: 48 %
HGB BLD-MCNC: 14.8 G/DL
ISTAT IONIZED CALCIUM FOR CHEM 8: 1.18 MMOL/L (ref 1.12–1.32)
LYMPHOCYTES # BLD AUTO: 0.5 X10ˆ3/UL (ref 1–4)
LYMPHOCYTES NFR BLD AUTO: 5.3 %
MCH RBC QN AUTO: 29.6 PG (ref 26–34)
MCHC RBC AUTO-ENTMCNC: 33.1 G/DL (ref 31–37)
MCV RBC AUTO: 89.4 FL (ref 80–100)
MIXED CELL %: 10.2 %
NEUTROPHILS # BLD AUTO: 7.1 X10ˆ3/UL (ref 1.5–7.7)
NEUTROPHILS NFR BLD AUTO: 84.5 %
PLATELET # BLD AUTO: 296 X10ˆ3/UL (ref 150–450)
POCT INFLUENZA A: NEGATIVE
POCT INFLUENZA B: NEGATIVE
POTASSIUM BLD-SCNC: 4.4 MMOL/L (ref 3.6–5.1)
RBC # BLD AUTO: 5 X10ˆ6/UL
SARS-COV-2 RNA RESP QL NAA+PROBE: DETECTED
SODIUM BLD-SCNC: 140 MMOL/L (ref 136–145)
TROPONIN I BLD-MCNC: <0.02 NG/ML
WBC # BLD AUTO: 8.5 X10ˆ3/UL (ref 4–11)

## 2022-06-16 PROCEDURE — 84484 ASSAY OF TROPONIN QUANT: CPT | Performed by: NURSE PRACTITIONER

## 2022-06-16 PROCEDURE — 80047 BASIC METABLC PNL IONIZED CA: CPT | Performed by: NURSE PRACTITIONER

## 2022-06-16 PROCEDURE — 87502 INFLUENZA DNA AMP PROBE: CPT | Performed by: NURSE PRACTITIONER

## 2022-06-16 PROCEDURE — 93000 ELECTROCARDIOGRAM COMPLETE: CPT | Performed by: NURSE PRACTITIONER

## 2022-06-16 PROCEDURE — U0002 COVID-19 LAB TEST NON-CDC: HCPCS | Performed by: NURSE PRACTITIONER

## 2022-06-16 PROCEDURE — 71046 X-RAY EXAM CHEST 2 VIEWS: CPT | Performed by: NURSE PRACTITIONER

## 2022-06-16 PROCEDURE — 99214 OFFICE O/P EST MOD 30 MIN: CPT | Performed by: NURSE PRACTITIONER

## 2022-06-16 PROCEDURE — 36415 COLL VENOUS BLD VENIPUNCTURE: CPT | Performed by: NURSE PRACTITIONER

## 2022-06-16 PROCEDURE — 85025 COMPLETE CBC W/AUTO DIFF WBC: CPT | Performed by: NURSE PRACTITIONER

## 2022-06-16 PROCEDURE — A9150 MISC/EXPER NON-PRESCRIPT DRU: HCPCS | Performed by: NURSE PRACTITIONER

## 2022-06-16 RX ORDER — ACETAMINOPHEN 500 MG
1000 TABLET ORAL ONCE
Status: COMPLETED | OUTPATIENT
Start: 2022-06-16 | End: 2022-06-16

## 2022-06-16 NOTE — ED INITIAL ASSESSMENT (HPI)
Pt presents to IC with c/o left arm and jaw pain beginning around noon today. Pt states she has felt her heart racing today. Patient is alert and oriented x 4, denies SOB.

## 2022-06-17 ENCOUNTER — TELEPHONE (OUTPATIENT)
Dept: INTERNAL MEDICINE CLINIC | Facility: CLINIC | Age: 52
End: 2022-06-17

## 2022-06-17 NOTE — TELEPHONE ENCOUNTER
Patient wants to know if she is a candidate for covid pills or MAB infusion. Patient was seen in urgent care yesterday, dx with covid and was given options to treat covid but did not give her anything. Please advise on treatment options.

## 2022-06-18 NOTE — TELEPHONE ENCOUNTER
She was given instruction from urgent care   And I agree with plan    Discussed treatment options monoclonal antibodies versus oral Paxlovid. Patient is requesting monoclonal antibodies however due to time of day unable to obtain today. Discussed returning tomorrow or seeking infusion at the emergency room tonight. States she will come back tomorrow morning.

## 2022-06-22 ENCOUNTER — APPOINTMENT (OUTPATIENT)
Dept: GENERAL RADIOLOGY | Age: 52
End: 2022-06-22
Attending: NURSE PRACTITIONER
Payer: COMMERCIAL

## 2022-06-22 ENCOUNTER — HOSPITAL ENCOUNTER (OUTPATIENT)
Age: 52
Discharge: HOME | End: 2022-06-22
Payer: COMMERCIAL

## 2022-06-22 VITALS
DIASTOLIC BLOOD PRESSURE: 77 MMHG | OXYGEN SATURATION: 100 % | RESPIRATION RATE: 18 BRPM | HEART RATE: 92 BPM | SYSTOLIC BLOOD PRESSURE: 127 MMHG | TEMPERATURE: 97 F

## 2022-06-22 DIAGNOSIS — U07.1 COVID-19: Primary | ICD-10-CM

## 2022-06-22 PROCEDURE — 71046 X-RAY EXAM CHEST 2 VIEWS: CPT | Performed by: NURSE PRACTITIONER

## 2022-06-22 PROCEDURE — 99213 OFFICE O/P EST LOW 20 MIN: CPT | Performed by: NURSE PRACTITIONER

## 2022-06-22 RX ORDER — BENZONATATE 100 MG/1
100 CAPSULE ORAL 3 TIMES DAILY PRN
Qty: 20 CAPSULE | Refills: 0 | Status: SHIPPED | OUTPATIENT
Start: 2022-06-22 | End: 2022-06-29

## 2022-06-22 NOTE — ED INITIAL ASSESSMENT (HPI)
Pt presents to IC with c/o sinus pressure and coughing, nasal drainage for past few weeks. Denies fevers. Dx with covid few weeks ago.

## 2022-06-26 ENCOUNTER — APPOINTMENT (OUTPATIENT)
Dept: ULTRASOUND IMAGING | Facility: HOSPITAL | Age: 52
End: 2022-06-26
Attending: EMERGENCY MEDICINE
Payer: COMMERCIAL

## 2022-06-26 ENCOUNTER — HOSPITAL ENCOUNTER (OUTPATIENT)
Facility: HOSPITAL | Age: 52
Setting detail: OBSERVATION
Discharge: HOME | End: 2022-06-26
Attending: EMERGENCY MEDICINE | Admitting: HOSPITALIST
Payer: COMMERCIAL

## 2022-06-26 VITALS
WEIGHT: 185.13 LBS | HEIGHT: 66 IN | DIASTOLIC BLOOD PRESSURE: 75 MMHG | SYSTOLIC BLOOD PRESSURE: 126 MMHG | OXYGEN SATURATION: 97 % | RESPIRATION RATE: 18 BRPM | TEMPERATURE: 98 F | HEART RATE: 74 BPM | BODY MASS INDEX: 29.75 KG/M2

## 2022-06-26 DIAGNOSIS — K80.50 BILIARY COLIC: Primary | ICD-10-CM

## 2022-06-26 LAB
ALBUMIN SERPL-MCNC: 4 G/DL (ref 3.4–5)
ALP LIVER SERPL-CCNC: 72 U/L
ALT SERPL-CCNC: 35 U/L
ANION GAP SERPL CALC-SCNC: 6 MMOL/L (ref 0–18)
AST SERPL-CCNC: 26 U/L (ref 15–37)
BASOPHILS # BLD AUTO: 0.09 X10(3) UL (ref 0–0.2)
BASOPHILS NFR BLD AUTO: 0.8 %
BILIRUB DIRECT SERPL-MCNC: <0.1 MG/DL (ref 0–0.2)
BILIRUB SERPL-MCNC: 0.3 MG/DL (ref 0.1–2)
BILIRUB UR QL: NEGATIVE
BUN BLD-MCNC: 10 MG/DL (ref 7–18)
BUN/CREAT SERPL: 8.6 (ref 10–20)
CALCIUM BLD-MCNC: 9.6 MG/DL (ref 8.5–10.1)
CHLORIDE SERPL-SCNC: 111 MMOL/L (ref 98–112)
CLARITY UR: CLEAR
CO2 SERPL-SCNC: 25 MMOL/L (ref 21–32)
COLOR UR: YELLOW
CREAT BLD-MCNC: 1.16 MG/DL
DEPRECATED RDW RBC AUTO: 39.2 FL (ref 35.1–46.3)
EOSINOPHIL # BLD AUTO: 0.33 X10(3) UL (ref 0–0.7)
EOSINOPHIL NFR BLD AUTO: 2.8 %
ERYTHROCYTE [DISTWIDTH] IN BLOOD BY AUTOMATED COUNT: 11.9 % (ref 11–15)
GLUCOSE BLD-MCNC: 92 MG/DL (ref 70–99)
GLUCOSE UR-MCNC: NEGATIVE MG/DL
HCT VFR BLD AUTO: 46.5 %
HGB BLD-MCNC: 15.5 G/DL
HGB UR QL STRIP.AUTO: NEGATIVE
HYALINE CASTS #/AREA URNS AUTO: PRESENT /LPF
IMM GRANULOCYTES # BLD AUTO: 0.04 X10(3) UL (ref 0–1)
IMM GRANULOCYTES NFR BLD: 0.3 %
LIPASE SERPL-CCNC: 291 U/L (ref 73–393)
LYMPHOCYTES # BLD AUTO: 3.92 X10(3) UL (ref 1–4)
LYMPHOCYTES NFR BLD AUTO: 33.4 %
MCH RBC QN AUTO: 30 PG (ref 26–34)
MCHC RBC AUTO-ENTMCNC: 33.3 G/DL (ref 31–37)
MCV RBC AUTO: 89.9 FL
MONOCYTES # BLD AUTO: 0.83 X10(3) UL (ref 0.1–1)
MONOCYTES NFR BLD AUTO: 7.1 %
NEUTROPHILS # BLD AUTO: 6.54 X10 (3) UL (ref 1.5–7.7)
NEUTROPHILS # BLD AUTO: 6.54 X10(3) UL (ref 1.5–7.7)
NEUTROPHILS NFR BLD AUTO: 55.6 %
NITRITE UR QL STRIP.AUTO: NEGATIVE
OSMOLALITY SERPL CALC.SUM OF ELEC: 293 MOSM/KG (ref 275–295)
PH UR: 5.5 [PH] (ref 5–8)
PLATELET # BLD AUTO: 402 10(3)UL (ref 150–450)
POTASSIUM SERPL-SCNC: 3.8 MMOL/L (ref 3.5–5.1)
PROT SERPL-MCNC: 7.8 G/DL (ref 6.4–8.2)
PROT UR-MCNC: NEGATIVE MG/DL
RBC # BLD AUTO: 5.17 X10(6)UL
SARS-COV-2 RNA RESP QL NAA+PROBE: DETECTED
SODIUM SERPL-SCNC: 142 MMOL/L (ref 136–145)
SP GR UR STRIP: 1.02 (ref 1–1.03)
UROBILINOGEN UR STRIP-ACNC: 0.2
WBC # BLD AUTO: 11.8 X10(3) UL (ref 4–11)

## 2022-06-26 PROCEDURE — 99236 HOSP IP/OBS SAME DATE HI 85: CPT | Performed by: HOSPITALIST

## 2022-06-26 PROCEDURE — 76705 ECHO EXAM OF ABDOMEN: CPT | Performed by: EMERGENCY MEDICINE

## 2022-06-26 PROCEDURE — 99244 OFF/OP CNSLTJ NEW/EST MOD 40: CPT | Performed by: SURGERY

## 2022-06-26 RX ORDER — ONDANSETRON 2 MG/ML
4 INJECTION INTRAMUSCULAR; INTRAVENOUS EVERY 4 HOURS PRN
Status: ACTIVE | OUTPATIENT
Start: 2022-06-26 | End: 2022-06-26

## 2022-06-26 RX ORDER — LEVOFLOXACIN 750 MG/1
750 TABLET ORAL DAILY
Qty: 14 TABLET | Refills: 0 | Status: SHIPPED | OUTPATIENT
Start: 2022-06-26 | End: 2022-07-10

## 2022-06-26 RX ORDER — LORAZEPAM 2 MG/ML
1 INJECTION INTRAMUSCULAR ONCE
Status: COMPLETED | OUTPATIENT
Start: 2022-06-26 | End: 2022-06-26

## 2022-06-26 RX ORDER — METRONIDAZOLE 500 MG/1
500 TABLET ORAL 3 TIMES DAILY
Qty: 42 TABLET | Refills: 0 | Status: SHIPPED | OUTPATIENT
Start: 2022-06-26 | End: 2022-07-10

## 2022-06-26 RX ORDER — KETOROLAC TROMETHAMINE 15 MG/ML
15 INJECTION, SOLUTION INTRAMUSCULAR; INTRAVENOUS EVERY 6 HOURS PRN
Status: DISCONTINUED | OUTPATIENT
Start: 2022-06-26 | End: 2022-06-26

## 2022-06-26 RX ORDER — ONDANSETRON 2 MG/ML
4 INJECTION INTRAMUSCULAR; INTRAVENOUS ONCE
Status: COMPLETED | OUTPATIENT
Start: 2022-06-26 | End: 2022-06-26

## 2022-06-26 RX ORDER — KETOROLAC TROMETHAMINE 15 MG/ML
15 INJECTION, SOLUTION INTRAMUSCULAR; INTRAVENOUS ONCE
Status: COMPLETED | OUTPATIENT
Start: 2022-06-26 | End: 2022-06-26

## 2022-06-26 RX ORDER — SODIUM CHLORIDE 9 MG/ML
INJECTION, SOLUTION INTRAVENOUS CONTINUOUS
Status: DISCONTINUED | OUTPATIENT
Start: 2022-06-26 | End: 2022-06-26

## 2022-06-26 RX ORDER — SODIUM CHLORIDE 9 MG/ML
INJECTION, SOLUTION INTRAVENOUS CONTINUOUS
Status: ACTIVE | OUTPATIENT
Start: 2022-06-26 | End: 2022-06-26

## 2022-06-26 NOTE — DISCHARGE SUMMARY
Hospital Discharge Diagnoses: Acute cholecystitis       59-90 High Risk  29-58 Medium Risk  0-28   Low Risk. TCM Follow-Up Recommendation:  LACE > 58:  High Risk of readmission after discharge from the hospital.    Total DC time > 30 min

## 2022-06-26 NOTE — ED QUICK NOTES
Orders for admission, patient is aware of plan and ready to go upstairs. Any questions, please call ED RN Epifanio Mcintyre at extension [phone].      Patient Covid vaccination status: Partially vaccinated     COVID Test Ordered in ED: Rapid SARS-CoV-2 by PCR    COVID Suspicion at Admission: N/A    Running Infusions:      Mental Status/LOC at time of transport: axo4    Other pertinent information:   CIWA score: N/A   NIH score:  N/A

## 2022-06-26 NOTE — H&P
[address] NAME: Toya Cadena   ATTENDING PHYSICIAN: Teresa Daniels MD   PATIENT ACCOUNT#:   [id number]    LOCATION:  [address] [place] [address] RECORD #:   [id number]       YOB: 1970  ADMISSION DATE:       06/26/2022    HISTORY AND PHYSICAL EXAMINATION    CHIEF COMPLAINT:  Abdominal pain. HISTORY OF PRESENT ILLNESS:  The patient is a 67-year-old female with past medical history of Hodgkin's lymphoma, history of endometriosis, and recent history of COVID infection who has come to the hospital complaining of right-sided severe upper abdominal pain which is radiating to her back. It is associated with nausea. That is what prompted her visit to the emergency department. She did undergo an ultrasound of the gallbladder and was found to have mildly overdistended gallbladder with intraluminal gallstones, which 1 is nonmobile at the gallbladder neck. The patient was given IV analgesics, IV antibiotics, IV hydration, and was admitted to the hospital for further management. She was seen and examined on the medical floor. Both she and  are debating whether they want to stay in the hospital or leave. They are asking if this is an emergent procedure. Currently, she denies any chest pain, shortness of breath, palpitations, nausea, vomiting, diarrhea, headaches, blurred vision. PAST MEDICAL HISTORY:  Positive for tachycardia, Hodgkin's lymphoma, endometriosis. PAST SURGICAL HISTORY:  Positive for removal of ovaries, chemotherapy. MEDICATIONS:  Home medications have been reviewed and reconciled. Please refer to the patient's chart for a detailed review regarding the patient's home medications. ALLERGIES:  Entex and morphine. FAMILY HISTORY:  Positive for heart disorder in mother. SOCIAL HISTORY:  The patient is a former smoker, quit in 2016. Does have alcohol. Denies any illicit drug use.     REVIEW OF SYSTEMS:  A 10-point review of systems has been obtained and otherwise negative. PHYSICAL EXAMINATION:    GENERAL:  Patient lying in bed, appears to be in no acute distress. A and O x3. VITAL SIGNS:  Temperature 98.1, pulse 74, respirations 18, blood pressure 126/75, saturating 97% on room air. HEENT:  Extraocular movements are intact. Pupils equal, round, and reactive to light and accommodation. Atraumatic, normocephalic. LUNGS:  Good air entry bilaterally. HEART:  S1, S2 appreciated. ABDOMEN:  Soft, nontender, nondistended. EXTREMITIES:  Peripheral pulses are positive. MUSCULOSKELETAL:  Full range of motion, intact. NEUROLOGIC:  No focal neurologic deficits noted at this time. PSYCHIATRIC:  Appropriate affect. SKIN:  No rashes noted. LABORATORY DATA:  Patient's glucose is 92, sodium is 142, potassium is 3.8, chloride is 111, carbon dioxide is 25, BUN is 10, creatinine is 1.16, calcium is 9.6. WBC is 7.8, hemoglobin is 15.5, hematocrit is 46.5, platelets are 823. UA is positive for mild pyuria. Rapid COVID is positive as well. ASSESSMENT AND PLAN:  The patient is a 60-year-old female who has been admitted to the hospital with acute cholecystitis. 1.   Acute cholecystitis. The patient's gallbladder ultrasound has been reviewed. Surgical consult in place. We will continue the patient's IV hydration, IV antiemetics, IV analgesics, as well as IV antibiotics. The patient is currently debating whether she would like to proceed with the procedure or not or would like to defer the procedure as she states that she has a sinus infection and recently recovering from [facility]. Discussed with the patient that this is going to keep happening and it may get worse. Both  and patient are discussing it and they will let us know. 2.   Leukocytosis likely due to above. 3.   COVID infection. The patient was diagnosed approximately 10 days ago. She still states that she has some sinus symptoms. 4. VTE prophylaxis.   Currently, we will hold pharmacologic VTE prophylaxis in the setting of possible surgery. 5.   Disposition. At this time, we will monitor closely. The patient is a Full Code. Further recommendations to follow. Greater than 70 minutes were spent with  greater than 50% of the time spent face-to-face.     Dictated By She Gentile MD  d: 06/26/2022 12:10:52  t: 06/26/2022 14:49:48  Job [id number]  SMS/

## 2022-06-27 ENCOUNTER — TELEPHONE (OUTPATIENT)
Dept: SURGERY | Facility: CLINIC | Age: 52
End: 2022-06-27

## 2022-06-27 ENCOUNTER — TELEPHONE (OUTPATIENT)
Dept: INTERNAL MEDICINE CLINIC | Facility: CLINIC | Age: 52
End: 2022-06-27

## 2022-06-27 NOTE — DISCHARGE SUMMARY
[facility]    PATIENT'S NAME: Kenyetta Head   ATTENDING PHYSICIAN: Lu Ibanez MD   PATIENT ACCOUNT#:   [id number]    LOCATION:  [address] [place] [address] RECORD #:   [id number]       YOB: 1970  ADMISSION DATE:       06/26/2022      DISCHARGE DATE:  06/26/2022    DISCHARGE SUMMARY      DISCHARGE DIAGNOSES:    1. Acute cholecystitis present on admission. 2.   Acute COVID infection present on admission. HISTORY AND HOSPITAL COURSE:  The patient is a 61-year-old female who has been coming into the hospital with recurrent episodes of biliary colic and cholecystitis. She was admitted to the hospital and started on IV antibiotics and IV analgesics. She was offered the option of surgery, but she currently is refusing surgery. She is deferring surgery at this time. She states she does not have time for the recovery at this time. It was discussed with her and her  at bedside in great detail that it would be of much benefit for her to get the surgery done, but she did not want to get the surgery done. She wants to defer it to a later time. We did tell her it may get worse and it will not work. She verbalized understanding of all information given. Also, the surgeon has spoken with them as well. At this time the patient will be deemed stable to be discharged home to follow up with primary care physician as outpatient, as well as Surgery as outpatient. PHYSICAL EXAMINATION:    VITAL SIGNS:  Reviewed from the patient's chart and currently stable. GENERAL:  The patient lying in bed, appears to be in no acute distress at this time. A and O x3. HEENT:  Extraocular movements are intact. Pupils equal, round, and reactive to light and accommodation. Atraumatic, normocephalic. CARDIAC:  S1, S2 appreciated. LUNGS:  Good air entry bilaterally. ABDOMEN:  Soft, nontender, nondistended. Positive bowel sounds. EXTREMITIES:  Peripheral pulses are positive.   NEUROLOGIC:  No focal neurologic deficits noted at this time. LABORATORY DATA:  Reviewed. CONDITION ON DISCHARGE:  At this time, the patient is deemed stable to be discharged home to follow up with primary care physician. Total discharge time is greater than 30 minutes.     Dictated By She Gentile MD  d: 06/26/2022 12:30:38  t: 06/27/2022 15:01:03  [facility] [id number]  SMS/

## 2022-06-28 ENCOUNTER — OFFICE VISIT (OUTPATIENT)
Dept: SURGERY | Facility: CLINIC | Age: 52
End: 2022-06-28
Payer: COMMERCIAL

## 2022-06-28 VITALS — WEIGHT: 185 LBS | BODY MASS INDEX: 30 KG/M2

## 2022-06-28 DIAGNOSIS — K80.20 CALCULUS OF GALLBLADDER WITHOUT CHOLECYSTITIS WITHOUT OBSTRUCTION: Primary | ICD-10-CM

## 2022-06-28 PROCEDURE — 99214 OFFICE O/P EST MOD 30 MIN: CPT | Performed by: SURGERY

## 2022-06-28 NOTE — TELEPHONE ENCOUNTER
Spoke with patient and relayed Dr. Maksim [place] agrees with sx appt today, as scheduled--patient verbalizes understanding and agreement. No further questions/concerns at this time.

## 2022-06-30 ENCOUNTER — TELEPHONE (OUTPATIENT)
Dept: SURGERY | Facility: CLINIC | Age: 52
End: 2022-06-30

## 2022-06-30 DIAGNOSIS — K80.10 CALCULUS OF GALLBLADDER WITH CHRONIC CHOLECYSTITIS WITHOUT OBSTRUCTION: Primary | ICD-10-CM

## 2022-06-30 NOTE — TELEPHONE ENCOUNTER
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 Advised patient she could travel but to be careful of what she is eating.

## 2022-07-11 ENCOUNTER — LAB ENCOUNTER (OUTPATIENT)
Dept: LAB | Age: 52
End: 2022-07-11
Attending: INTERNAL MEDICINE
Payer: COMMERCIAL

## 2022-07-11 ENCOUNTER — OFFICE VISIT (OUTPATIENT)
Dept: INTERNAL MEDICINE CLINIC | Facility: CLINIC | Age: 52
End: 2022-07-11
Payer: COMMERCIAL

## 2022-07-11 VITALS
DIASTOLIC BLOOD PRESSURE: 87 MMHG | WEIGHT: 181 LBS | HEIGHT: 66 IN | BODY MASS INDEX: 29.09 KG/M2 | HEART RATE: 102 BPM | SYSTOLIC BLOOD PRESSURE: 123 MMHG

## 2022-07-11 DIAGNOSIS — Z85.71 H/O HODGKIN'S LYMPHOMA: ICD-10-CM

## 2022-07-11 DIAGNOSIS — Z86.16 HISTORY OF COVID-19: ICD-10-CM

## 2022-07-11 DIAGNOSIS — K80.20 CALCULUS OF GALLBLADDER WITHOUT CHOLECYSTITIS WITHOUT OBSTRUCTION: Primary | ICD-10-CM

## 2022-07-11 DIAGNOSIS — Z00.00 ROUTINE GENERAL MEDICAL EXAMINATION AT A HEALTH CARE FACILITY: ICD-10-CM

## 2022-07-11 DIAGNOSIS — Z72.0 TOBACCO ABUSE: ICD-10-CM

## 2022-07-11 DIAGNOSIS — Z12.31 VISIT FOR SCREENING MAMMOGRAM: ICD-10-CM

## 2022-07-11 LAB
ALBUMIN SERPL-MCNC: 3.9 G/DL (ref 3.4–5)
ALBUMIN/GLOB SERPL: 1 {RATIO} (ref 1–2)
ALP LIVER SERPL-CCNC: 74 U/L
ALT SERPL-CCNC: 35 U/L
ANION GAP SERPL CALC-SCNC: 5 MMOL/L (ref 0–18)
AST SERPL-CCNC: 25 U/L (ref 15–37)
BILIRUB SERPL-MCNC: 0.5 MG/DL (ref 0.1–2)
BUN BLD-MCNC: 8 MG/DL (ref 7–18)
BUN/CREAT SERPL: 7 (ref 10–20)
CALCIUM BLD-MCNC: 9.6 MG/DL (ref 8.5–10.1)
CHLORIDE SERPL-SCNC: 107 MMOL/L (ref 98–112)
CHOLEST SERPL-MCNC: 164 MG/DL (ref ?–200)
CO2 SERPL-SCNC: 27 MMOL/L (ref 21–32)
CREAT BLD-MCNC: 1.14 MG/DL
DEPRECATED RDW RBC AUTO: 40.9 FL (ref 35.1–46.3)
ERYTHROCYTE [DISTWIDTH] IN BLOOD BY AUTOMATED COUNT: 12.2 % (ref 11–15)
EST. AVERAGE GLUCOSE BLD GHB EST-MCNC: 100 MG/DL (ref 68–126)
FASTING PATIENT LIPID ANSWER: YES
FASTING STATUS PATIENT QL REPORTED: YES
GLOBULIN PLAS-MCNC: 4 G/DL (ref 2.8–4.4)
GLUCOSE BLD-MCNC: 93 MG/DL (ref 70–99)
HBA1C MFR BLD: 5.1 % (ref ?–5.7)
HCT VFR BLD AUTO: 47.8 %
HDLC SERPL-MCNC: 30 MG/DL (ref 40–59)
HGB BLD-MCNC: 15.8 G/DL
LDLC SERPL CALC-MCNC: 75 MG/DL (ref ?–100)
MCH RBC QN AUTO: 30 PG (ref 26–34)
MCHC RBC AUTO-ENTMCNC: 33.1 G/DL (ref 31–37)
MCV RBC AUTO: 90.9 FL
NONHDLC SERPL-MCNC: 134 MG/DL (ref ?–130)
OSMOLALITY SERPL CALC.SUM OF ELEC: 286 MOSM/KG (ref 275–295)
PLATELET # BLD AUTO: 348 10(3)UL (ref 150–450)
POTASSIUM SERPL-SCNC: 4.5 MMOL/L (ref 3.5–5.1)
PROT SERPL-MCNC: 7.9 G/DL (ref 6.4–8.2)
RBC # BLD AUTO: 5.26 X10(6)UL
SODIUM SERPL-SCNC: 139 MMOL/L (ref 136–145)
T4 FREE SERPL-MCNC: 1 NG/DL (ref 0.8–1.7)
TRIGL SERPL-MCNC: 366 MG/DL (ref 30–149)
TSI SER-ACNC: 1.04 MIU/ML (ref 0.36–3.74)
VLDLC SERPL CALC-MCNC: 57 MG/DL (ref 0–30)
WBC # BLD AUTO: 9.9 X10(3) UL (ref 4–11)

## 2022-07-11 PROCEDURE — 36415 COLL VENOUS BLD VENIPUNCTURE: CPT

## 2022-07-11 PROCEDURE — 84443 ASSAY THYROID STIM HORMONE: CPT

## 2022-07-11 PROCEDURE — 3079F DIAST BP 80-89 MM HG: CPT | Performed by: INTERNAL MEDICINE

## 2022-07-11 PROCEDURE — 81015 MICROSCOPIC EXAM OF URINE: CPT

## 2022-07-11 PROCEDURE — 80053 COMPREHEN METABOLIC PANEL: CPT

## 2022-07-11 PROCEDURE — 3074F SYST BP LT 130 MM HG: CPT | Performed by: INTERNAL MEDICINE

## 2022-07-11 PROCEDURE — 83036 HEMOGLOBIN GLYCOSYLATED A1C: CPT

## 2022-07-11 PROCEDURE — 99214 OFFICE O/P EST MOD 30 MIN: CPT | Performed by: INTERNAL MEDICINE

## 2022-07-11 PROCEDURE — 80061 LIPID PANEL: CPT

## 2022-07-11 PROCEDURE — 3008F BODY MASS INDEX DOCD: CPT | Performed by: INTERNAL MEDICINE

## 2022-07-11 PROCEDURE — 84439 ASSAY OF FREE THYROXINE: CPT

## 2022-07-11 PROCEDURE — 85027 COMPLETE CBC AUTOMATED: CPT

## 2022-07-11 NOTE — PAT NURSING NOTE
Notified Bennett Sanon at Dr. [facility] office that patient is still symptomatic with COVID symptoms and is seeing her PMD today.

## 2022-07-12 ENCOUNTER — TELEPHONE (OUTPATIENT)
Dept: SURGERY | Facility: CLINIC | Age: 52
End: 2022-07-12

## 2022-07-12 DIAGNOSIS — K80.10 CALCULUS OF GALLBLADDER WITH CHRONIC CHOLECYSTITIS WITHOUT OBSTRUCTION: Primary | ICD-10-CM

## 2022-07-13 ENCOUNTER — HOSPITAL ENCOUNTER (OUTPATIENT)
Dept: MAMMOGRAPHY | Age: 52
Discharge: HOME | End: 2022-07-13
Attending: INTERNAL MEDICINE
Payer: COMMERCIAL

## 2022-07-13 DIAGNOSIS — Z12.31 VISIT FOR SCREENING MAMMOGRAM: ICD-10-CM

## 2022-07-13 PROCEDURE — 77067 SCR MAMMO BI INCL CAD: CPT | Performed by: INTERNAL MEDICINE

## 2022-07-13 PROCEDURE — 77063 BREAST TOMOSYNTHESIS BI: CPT | Performed by: INTERNAL MEDICINE

## 2022-07-18 NOTE — PAT NURSING NOTE
S/w pt today re: pt still has + residual cough from Covid on 06/16/22 which is mild which triggers sometimes upon waking up. Denies any other s/s of Covid. Pt voiced out wants to proceed surgery as scheduled. Coni randall.

## 2022-07-20 ENCOUNTER — ANESTHESIA EVENT (OUTPATIENT)
Dept: SURGERY | Facility: HOSPITAL | Age: 52
End: 2022-07-20
Payer: COMMERCIAL

## 2022-07-20 ENCOUNTER — HOSPITAL ENCOUNTER (OUTPATIENT)
Facility: HOSPITAL | Age: 52
Setting detail: HOSPITAL OUTPATIENT SURGERY
Discharge: HOME | End: 2022-07-20
Attending: SURGERY | Admitting: SURGERY
Payer: COMMERCIAL

## 2022-07-20 ENCOUNTER — ANESTHESIA (OUTPATIENT)
Dept: SURGERY | Facility: HOSPITAL | Age: 52
End: 2022-07-20
Payer: COMMERCIAL

## 2022-07-20 VITALS
DIASTOLIC BLOOD PRESSURE: 72 MMHG | BODY MASS INDEX: 28.45 KG/M2 | OXYGEN SATURATION: 95 % | TEMPERATURE: 97 F | RESPIRATION RATE: 16 BRPM | HEIGHT: 66 IN | HEART RATE: 71 BPM | WEIGHT: 177 LBS | SYSTOLIC BLOOD PRESSURE: 127 MMHG

## 2022-07-20 DIAGNOSIS — K80.20 CALCULUS OF GALLBLADDER WITHOUT CHOLECYSTITIS WITHOUT OBSTRUCTION: ICD-10-CM

## 2022-07-20 PROCEDURE — 0FT44ZZ RESECTION OF GALLBLADDER, PERCUTANEOUS ENDOSCOPIC APPROACH: ICD-10-PCS | Performed by: SURGERY

## 2022-07-20 PROCEDURE — 47562 LAPAROSCOPIC CHOLECYSTECTOMY: CPT | Performed by: SURGERY

## 2022-07-20 RX ORDER — HYDROMORPHONE HYDROCHLORIDE 1 MG/ML
0.2 INJECTION, SOLUTION INTRAMUSCULAR; INTRAVENOUS; SUBCUTANEOUS EVERY 5 MIN PRN
Status: DISCONTINUED | OUTPATIENT
Start: 2022-07-20 | End: 2022-07-20

## 2022-07-20 RX ORDER — BUPIVACAINE HYDROCHLORIDE 5 MG/ML
INJECTION, SOLUTION EPIDURAL; INTRACAUDAL AS NEEDED
Status: DISCONTINUED | OUTPATIENT
Start: 2022-07-20 | End: 2022-07-20 | Stop reason: HOSPADM

## 2022-07-20 RX ORDER — ONDANSETRON 4 MG/1
4 TABLET, FILM COATED ORAL EVERY 8 HOURS PRN
Qty: 15 TABLET | Refills: 0 | Status: SHIPPED | OUTPATIENT
Start: 2022-07-20

## 2022-07-20 RX ORDER — ONDANSETRON 2 MG/ML
INJECTION INTRAMUSCULAR; INTRAVENOUS AS NEEDED
Status: DISCONTINUED | OUTPATIENT
Start: 2022-07-20 | End: 2022-07-20 | Stop reason: SURG

## 2022-07-20 RX ORDER — MORPHINE SULFATE 4 MG/ML
2 INJECTION, SOLUTION INTRAMUSCULAR; INTRAVENOUS EVERY 10 MIN PRN
Status: DISCONTINUED | OUTPATIENT
Start: 2022-07-20 | End: 2022-07-20

## 2022-07-20 RX ORDER — CEFAZOLIN SODIUM/WATER 2 G/20 ML
2 SYRINGE (ML) INTRAVENOUS ONCE
Status: COMPLETED | OUTPATIENT
Start: 2022-07-20 | End: 2022-07-20

## 2022-07-20 RX ORDER — HYDROCODONE BITARTRATE AND ACETAMINOPHEN 5; 325 MG/1; MG/1
1 TABLET ORAL ONCE AS NEEDED
Status: COMPLETED | OUTPATIENT
Start: 2022-07-20 | End: 2022-07-20

## 2022-07-20 RX ORDER — SODIUM CHLORIDE, SODIUM LACTATE, POTASSIUM CHLORIDE, CALCIUM CHLORIDE 600; 310; 30; 20 MG/100ML; MG/100ML; MG/100ML; MG/100ML
INJECTION, SOLUTION INTRAVENOUS CONTINUOUS
Status: DISCONTINUED | OUTPATIENT
Start: 2022-07-20 | End: 2022-07-20

## 2022-07-20 RX ORDER — ONDANSETRON 2 MG/ML
4 INJECTION INTRAMUSCULAR; INTRAVENOUS EVERY 6 HOURS PRN
Status: DISCONTINUED | OUTPATIENT
Start: 2022-07-20 | End: 2022-07-20

## 2022-07-20 RX ORDER — HYDROMORPHONE HYDROCHLORIDE 1 MG/ML
0.6 INJECTION, SOLUTION INTRAMUSCULAR; INTRAVENOUS; SUBCUTANEOUS EVERY 5 MIN PRN
Status: DISCONTINUED | OUTPATIENT
Start: 2022-07-20 | End: 2022-07-20

## 2022-07-20 RX ORDER — POLYETHYLENE GLYCOL 3350 17 G/17G
17 POWDER, FOR SOLUTION ORAL DAILY
Qty: 14 PACKET | Refills: 0 | Status: SHIPPED | OUTPATIENT
Start: 2022-07-20 | End: 2022-08-03

## 2022-07-20 RX ORDER — MIDAZOLAM HYDROCHLORIDE 1 MG/ML
INJECTION INTRAMUSCULAR; INTRAVENOUS AS NEEDED
Status: DISCONTINUED | OUTPATIENT
Start: 2022-07-20 | End: 2022-07-20 | Stop reason: SURG

## 2022-07-20 RX ORDER — HYDROCODONE BITARTRATE AND ACETAMINOPHEN 5; 325 MG/1; MG/1
2 TABLET ORAL ONCE AS NEEDED
Status: COMPLETED | OUTPATIENT
Start: 2022-07-20 | End: 2022-07-20

## 2022-07-20 RX ORDER — DEXAMETHASONE SODIUM PHOSPHATE 4 MG/ML
VIAL (ML) INJECTION AS NEEDED
Status: DISCONTINUED | OUTPATIENT
Start: 2022-07-20 | End: 2022-07-20 | Stop reason: SURG

## 2022-07-20 RX ORDER — ROCURONIUM BROMIDE 10 MG/ML
INJECTION, SOLUTION INTRAVENOUS AS NEEDED
Status: DISCONTINUED | OUTPATIENT
Start: 2022-07-20 | End: 2022-07-20 | Stop reason: SURG

## 2022-07-20 RX ORDER — DIPHENHYDRAMINE HYDROCHLORIDE 50 MG/ML
25 INJECTION INTRAMUSCULAR; INTRAVENOUS ONCE
Status: COMPLETED | OUTPATIENT
Start: 2022-07-20 | End: 2022-07-20

## 2022-07-20 RX ORDER — ACETAMINOPHEN 500 MG
1000 TABLET ORAL ONCE AS NEEDED
Status: COMPLETED | OUTPATIENT
Start: 2022-07-20 | End: 2022-07-20

## 2022-07-20 RX ORDER — HYDROCODONE BITARTRATE AND ACETAMINOPHEN 5; 325 MG/1; MG/1
1 TABLET ORAL EVERY 6 HOURS PRN
Qty: 30 TABLET | Refills: 0 | Status: SHIPPED | OUTPATIENT
Start: 2022-07-20

## 2022-07-20 RX ORDER — NALOXONE HYDROCHLORIDE 0.4 MG/ML
80 INJECTION, SOLUTION INTRAMUSCULAR; INTRAVENOUS; SUBCUTANEOUS AS NEEDED
Status: DISCONTINUED | OUTPATIENT
Start: 2022-07-20 | End: 2022-07-20

## 2022-07-20 RX ORDER — MORPHINE SULFATE 10 MG/ML
6 INJECTION, SOLUTION INTRAMUSCULAR; INTRAVENOUS EVERY 10 MIN PRN
Status: DISCONTINUED | OUTPATIENT
Start: 2022-07-20 | End: 2022-07-20

## 2022-07-20 RX ORDER — HYDROMORPHONE HYDROCHLORIDE 1 MG/ML
0.4 INJECTION, SOLUTION INTRAMUSCULAR; INTRAVENOUS; SUBCUTANEOUS EVERY 5 MIN PRN
Status: DISCONTINUED | OUTPATIENT
Start: 2022-07-20 | End: 2022-07-20

## 2022-07-20 RX ORDER — MORPHINE SULFATE 4 MG/ML
4 INJECTION, SOLUTION INTRAMUSCULAR; INTRAVENOUS EVERY 10 MIN PRN
Status: DISCONTINUED | OUTPATIENT
Start: 2022-07-20 | End: 2022-07-20

## 2022-07-20 RX ORDER — LIDOCAINE HYDROCHLORIDE 10 MG/ML
INJECTION, SOLUTION EPIDURAL; INFILTRATION; INTRACAUDAL; PERINEURAL AS NEEDED
Status: DISCONTINUED | OUTPATIENT
Start: 2022-07-20 | End: 2022-07-20 | Stop reason: SURG

## 2022-07-20 RX ORDER — PROCHLORPERAZINE EDISYLATE 5 MG/ML
5 INJECTION INTRAMUSCULAR; INTRAVENOUS EVERY 8 HOURS PRN
Status: DISCONTINUED | OUTPATIENT
Start: 2022-07-20 | End: 2022-07-20

## 2022-07-20 RX ADMIN — LIDOCAINE HYDROCHLORIDE 50 MG: 10 INJECTION, SOLUTION EPIDURAL; INFILTRATION; INTRACAUDAL; PERINEURAL at 12:43:00

## 2022-07-20 RX ADMIN — DEXAMETHASONE SODIUM PHOSPHATE 8 MG: 4 MG/ML VIAL (ML) INJECTION at 12:47:00

## 2022-07-20 RX ADMIN — SODIUM CHLORIDE, SODIUM LACTATE, POTASSIUM CHLORIDE, CALCIUM CHLORIDE: 600; 310; 30; 20 INJECTION, SOLUTION INTRAVENOUS at 12:40:00

## 2022-07-20 RX ADMIN — MIDAZOLAM HYDROCHLORIDE 2 MG: 1 INJECTION INTRAMUSCULAR; INTRAVENOUS at 12:41:00

## 2022-07-20 RX ADMIN — ONDANSETRON 4 MG: 2 INJECTION INTRAMUSCULAR; INTRAVENOUS at 12:47:00

## 2022-07-20 RX ADMIN — CEFAZOLIN SODIUM/WATER 2 G: 2 G/20 ML SYRINGE (ML) INTRAVENOUS at 12:57:00

## 2022-07-20 RX ADMIN — ROCURONIUM BROMIDE 50 MG: 10 INJECTION, SOLUTION INTRAVENOUS at 12:43:00

## 2022-07-20 RX ADMIN — SODIUM CHLORIDE, SODIUM LACTATE, POTASSIUM CHLORIDE, CALCIUM CHLORIDE: 600; 310; 30; 20 INJECTION, SOLUTION INTRAVENOUS at 13:36:00

## 2022-07-20 NOTE — ANESTHESIA PROCEDURE NOTES
Airway  Date/Time: 7/20/2022 12:46 PM  Urgency: Elective      General Information and Staff    Patient location during procedure: OR  Anesthesiologist: Kary Pascual MD  Resident/CRNA: Tatyana Aguilera CRNA  Performed: CRNA and anesthesiologist     Indications and Patient Condition  Indications for airway management: anesthesia  Sedation level: deep  Preoxygenated: yes  Patient position: sniffing  Mask difficulty assessment: 2 - vent by mask + OA or adjuvant +/- NMBA    Final Airway Details  Final airway type: endotracheal airway      Successful airway: ETT  Cuffed: yes   Successful intubation technique: direct laryngoscopy  Facilitating devices/methods: cricoid pressure  Endotracheal tube insertion site: oral  Blade: Susan  Blade size: #3  ETT size (mm): 7.0    Cormack-Lehane Classification: grade III - view of epiglottis only  Placement verified by: chest auscultation and capnometry   Measured from: teeth  Number of attempts at approach: 2    Additional Comments  Atraumatic intubation. x1 attempt by CRNA. x1 attempt by MD. +cricoid pressure. Dentition unchanged from preop baseline.

## 2022-07-20 NOTE — INTERVAL H&P NOTE
Pre-op Diagnosis: Calculus of gallbladder without cholecystitis without obstruction [K80.20]    The above referenced H&P was reviewed by Angle Copeland MD on 7/20/2022, the patient was examined and no significant changes have occurred in the patient's condition since the H&P was performed. I discussed with the patient and/or legal representative the potential benefits, risks and side effects of this procedure; the likelihood of the patient achieving goals; and potential problems that might occur during recuperation. I discussed reasonable alternatives to the procedure, including risks, benefits and side effects related to the alternatives and risks related to not receiving this procedure. We will proceed with procedure as planned.

## 2022-07-20 NOTE — OPERATIVE REPORT
Operative Report    Patient Name:  Fannie Hinds  MR:  [id number]  :  1970  DOS:  23    Preop Dx:  Calculus of gallbladder without cholecystitis without obstruction [K80.20]  Postop Dx:  same  Procedure:  Laparoscopic cholecystectomy  Surgeon:  Frederick Mabry MD  Surgical Assistant.: Feliz Leventhal, CSA  EBL: 5 ml  Complication:  None    INDICATION:  Pt is a 49year old female who with Calculus of gallbladder without cholecystitis without obstruction [K80.20] who is scheduled for a Laparoscopic cholecystectomy, possible open. CONSENT:  An informed consent discussion was held with the patient regarding the nature of Calculus of gallbladder without cholecystitis without obstruction [K80.20], the treatment options and the details of the procedure. The risks including but not limited to bleeding, wound infection, intra-abdominal infection, injury to the liver, colon, small intestine, pancreas, stomach, common bile duct, incomplete resection, cystic duct stump leak, retained stone and incisional hernia were discussed. The patient expressed understanding and want to proceed with the planned procedure. TECHNIQUE:  The patient was taken to the OR and placed in supine position. General anesthesia was established and the abdomen was prepped in standard fashion. Pneumoperitoneum was obtained using open technique through a supra-umbilical incision. A 12-mm trocar was inserted under direct visualization and no injury occurred. Examination of the abdomen showed a normal appearing gallbladder consistent with Calculus of gallbladder without cholecystitis without obstruction [K80.20]. Three 5-mm trocars were placed in the epigastric and right abdomen. The patient was placed in reverse Trendelenburg position. The fundus was grasped and retracted cephalad. The infundibulum was grasped and retracted inferior, anterior, and to the right.   The peritoneum along the medial and lateral aspect of the gallbladder/liver edge were incised using hook cautery. The lower 1/3 of the gallbladder was dissected from the liver. Two structures, identified as the cystic duct and artery, are seen entering the infundibulum, thus obtaining the so called \"critical view of safety\". The cystic duct and artery were clipped and divided. The gallbladder was detached from the liver using hook cautery and delivered from the abdomen using an endocatch bag. The abdominal cavity was irrigated with saline and found to be hemostatic. The trocars were removed under direct visualization and no port site bleeding was seen. The supra-umbilical fascia was closed using 0 vicryl. The skin incisions were closed using 4-0 vicryl. Sterile dressings were applied. All instrument and sponge counts were correct. I was present during the critical portions of the procedure.     Lisbeth Silverio MD

## 2022-08-02 ENCOUNTER — TELEMEDICINE (OUTPATIENT)
Dept: SURGERY | Facility: CLINIC | Age: 52
End: 2022-08-02
Payer: COMMERCIAL

## 2022-08-02 DIAGNOSIS — Z09 POSTOPERATIVE EXAMINATION: Primary | ICD-10-CM

## 2022-08-02 PROCEDURE — 99024 POSTOP FOLLOW-UP VISIT: CPT | Performed by: SURGERY

## 2022-08-03 NOTE — PROGRESS NOTES
Pt agrees to video visit. Doing well sp lap holden. No wound issues. Normal bowel function. Path result reviewed. AP:  Doing well. Avoid heavy lifting for total of 6 weeks. F/u prn.

## 2023-02-21 NOTE — ED PROVIDER NOTES
Cold and congestion, says her ears hurt        1 1/2 weeks ago fell off a chair, hit head. Has been acting overall well. Little needy and cranky. May be eating a little less. For a week has had cold symptoms. Has thick nasal drainage, more clear. Appetite is down. Sleeping not as well but just learned to get out of her crib. Little cough. No vomiting.    Brother has some cold symptoms.    Chart Review:  4/20/22 omnicef ear infection.    GENERAL:  slightly 'droopy' looking, but alert and not toxic  HYDRATION:  well hydrated: moist mucous membranes, good tear production & skin turgor, eyes not sunken  EARS:  Left TM red and full and Right TM dull  NOSE:  nasal congestion  THROAT:  no significant tonsillar hypertrophy or inflam., or oropharyngeal lesions  NECK:  supple and no cervical or supraclavicular lymphadenopathy  LUNGS:  Chest totally clear; no rales, rhonchi, wheezes or stridor and no tachypnea or retractions    A/P:  See Diagnosis, Orders/Medication, and Follow-up instructions.  LOM amox  uri     Patient Seen in: [facility]      History   Patient presents with:  Sore Throat    Stated Complaint: sorethroat/white spots    HPI/Subjective:   HPI    20-year-old female presents for evaluation of sore throat.     Patient reports 4 days of sor atraumatic. Right Ear: External ear normal.      Left Ear: External ear normal.      Nose: Nose normal.      Mouth/Throat:      Mouth: Mucous membranes are moist.      Pharynx: Oropharynx is clear. Uvula midline.  Posterior oropharyngeal erythema prese

## 2023-04-21 ENCOUNTER — TELEPHONE (OUTPATIENT)
Dept: INTERNAL MEDICINE CLINIC | Facility: CLINIC | Age: 53
End: 2023-04-21

## 2023-04-21 DIAGNOSIS — Z12.11 SCREENING FOR COLON CANCER: Primary | ICD-10-CM

## 2023-05-11 ENCOUNTER — TELEPHONE (OUTPATIENT)
Dept: CASE MANAGEMENT | Age: 53
End: 2023-05-11

## 2023-05-11 DIAGNOSIS — R14.0 BLOATING: ICD-10-CM

## 2023-05-11 DIAGNOSIS — K21.9 GASTROESOPHAGEAL REFLUX DISEASE, UNSPECIFIED WHETHER ESOPHAGITIS PRESENT: Primary | ICD-10-CM

## 2023-05-11 NOTE — TELEPHONE ENCOUNTER
Dr. Madeline Narayan,     Patient is requesting a referral to Dr. Rolando Goins for bloating and acid reflux. Pended referral please review diagnosis and sign off if you agree. Thank you.   Hank Wills

## 2023-05-19 ENCOUNTER — OFFICE VISIT (OUTPATIENT)
Dept: INTERNAL MEDICINE CLINIC | Facility: CLINIC | Age: 53
End: 2023-05-19

## 2023-05-19 VITALS
HEIGHT: 66 IN | BODY MASS INDEX: 31.76 KG/M2 | OXYGEN SATURATION: 97 % | WEIGHT: 197.63 LBS | DIASTOLIC BLOOD PRESSURE: 90 MMHG | SYSTOLIC BLOOD PRESSURE: 146 MMHG | HEART RATE: 84 BPM

## 2023-05-19 DIAGNOSIS — M54.50 CHRONIC BILATERAL LOW BACK PAIN WITHOUT SCIATICA: ICD-10-CM

## 2023-05-19 DIAGNOSIS — G89.29 CHRONIC BILATERAL LOW BACK PAIN WITHOUT SCIATICA: ICD-10-CM

## 2023-05-19 DIAGNOSIS — M54.12 LEFT CERVICAL RADICULOPATHY: Primary | ICD-10-CM

## 2023-05-19 PROCEDURE — 3077F SYST BP >= 140 MM HG: CPT | Performed by: INTERNAL MEDICINE

## 2023-05-19 PROCEDURE — 99214 OFFICE O/P EST MOD 30 MIN: CPT | Performed by: INTERNAL MEDICINE

## 2023-05-19 PROCEDURE — 3080F DIAST BP >= 90 MM HG: CPT | Performed by: INTERNAL MEDICINE

## 2023-05-19 PROCEDURE — 3008F BODY MASS INDEX DOCD: CPT | Performed by: INTERNAL MEDICINE

## 2023-05-19 RX ORDER — NAPROXEN 500 MG/1
500 TABLET ORAL 2 TIMES DAILY WITH MEALS
Qty: 30 TABLET | Refills: 0 | Status: SHIPPED | OUTPATIENT
Start: 2023-05-19 | End: 2024-05-13

## 2023-05-19 NOTE — PATIENT INSTRUCTIONS
Please avoid painful activity and apply heat to the affected area 2-3 times daily. Please take naproxen 500 mg twice daily with meals. Please schedule a cervical spine MRI and physical therapy.

## 2023-05-31 ENCOUNTER — TELEPHONE (OUTPATIENT)
Dept: PHYSICAL THERAPY | Facility: HOSPITAL | Age: 53
End: 2023-05-31

## 2023-06-05 ENCOUNTER — OFFICE VISIT (OUTPATIENT)
Dept: PHYSICAL THERAPY | Facility: HOSPITAL | Age: 53
End: 2023-06-05
Attending: INTERNAL MEDICINE
Payer: COMMERCIAL

## 2023-06-05 DIAGNOSIS — M54.12 LEFT CERVICAL RADICULOPATHY: ICD-10-CM

## 2023-06-05 PROCEDURE — 97162 PT EVAL MOD COMPLEX 30 MIN: CPT

## 2023-06-08 ENCOUNTER — HOSPITAL ENCOUNTER (OUTPATIENT)
Dept: MRI IMAGING | Age: 53
Discharge: HOME | End: 2023-06-08
Attending: INTERNAL MEDICINE
Payer: COMMERCIAL

## 2023-06-08 DIAGNOSIS — M54.12 LEFT CERVICAL RADICULOPATHY: ICD-10-CM

## 2023-06-08 PROCEDURE — 72141 MRI NECK SPINE W/O DYE: CPT | Performed by: INTERNAL MEDICINE

## 2023-06-09 ENCOUNTER — OFFICE VISIT (OUTPATIENT)
Dept: PHYSICAL THERAPY | Facility: HOSPITAL | Age: 53
End: 2023-06-09
Attending: INTERNAL MEDICINE
Payer: COMMERCIAL

## 2023-06-09 PROCEDURE — 97140 MANUAL THERAPY 1/> REGIONS: CPT

## 2023-06-09 PROCEDURE — 97110 THERAPEUTIC EXERCISES: CPT

## 2023-06-12 ENCOUNTER — OFFICE VISIT (OUTPATIENT)
Dept: PHYSICAL THERAPY | Facility: HOSPITAL | Age: 53
End: 2023-06-12
Attending: INTERNAL MEDICINE
Payer: COMMERCIAL

## 2023-06-12 PROCEDURE — 97140 MANUAL THERAPY 1/> REGIONS: CPT

## 2023-06-12 PROCEDURE — 97110 THERAPEUTIC EXERCISES: CPT

## 2023-06-14 ENCOUNTER — OFFICE VISIT (OUTPATIENT)
Dept: PHYSICAL THERAPY | Facility: HOSPITAL | Age: 53
End: 2023-06-14
Attending: INTERNAL MEDICINE
Payer: COMMERCIAL

## 2023-06-14 PROCEDURE — 97110 THERAPEUTIC EXERCISES: CPT

## 2023-06-14 PROCEDURE — 97140 MANUAL THERAPY 1/> REGIONS: CPT

## 2023-06-19 ENCOUNTER — APPOINTMENT (OUTPATIENT)
Dept: PHYSICAL THERAPY | Facility: HOSPITAL | Age: 53
End: 2023-06-19
Attending: INTERNAL MEDICINE
Payer: COMMERCIAL

## 2023-06-21 ENCOUNTER — APPOINTMENT (OUTPATIENT)
Dept: PHYSICAL THERAPY | Facility: HOSPITAL | Age: 53
End: 2023-06-21
Attending: INTERNAL MEDICINE
Payer: COMMERCIAL

## 2023-06-22 ENCOUNTER — APPOINTMENT (OUTPATIENT)
Dept: PHYSICAL THERAPY | Facility: HOSPITAL | Age: 53
End: 2023-06-22
Attending: INTERNAL MEDICINE
Payer: COMMERCIAL

## 2023-06-26 ENCOUNTER — OFFICE VISIT (OUTPATIENT)
Dept: PHYSICAL THERAPY | Facility: HOSPITAL | Age: 53
End: 2023-06-26
Attending: INTERNAL MEDICINE
Payer: COMMERCIAL

## 2023-06-26 PROCEDURE — 97140 MANUAL THERAPY 1/> REGIONS: CPT

## 2023-06-26 PROCEDURE — 97110 THERAPEUTIC EXERCISES: CPT

## 2023-06-26 NOTE — PROGRESS NOTES
Diagnosis:   Left cervical radiculopathy (M54.12)      Referring Provider: Aaron Dennis  Date of Evaluation:    6/5/2023    Precautions:  Cancer Next MD visit:   none scheduled  Date of Surgery: n/a   Insurance Primary/Secondary: [address] [place], [place] [id number] / N/A     # Auth Visits: 12 visits by 8/30/23            Subjective: Patient reports feeling increased symptoms after last visit, including pain that went down her arm to her thumb. Has had this pain off/on until a few days ago, now more localized to her neck and shoulder again. Pain is rated 4-5/10 at start of visit. Objective:     Initial Evaluation   Subjective *  Turning her head to the L while driving, prolonged computer work, looking up, house cleaning    Objective *  Cervical AROM    Extension: 50    Sidebending: R 10; L 18 *neck and UE pain with OP mid- and low cervical    Rotation: R 50; L 50    Quadrant: R WNL; L 25% limited *neck pain    Lower cervical quadrant: R 25% limited; L 25% limited *neck pain  PAIVM: Hypomobile L UPA C4-5, C5-6, C6-7. *Neck pain at C6-7 > C5-6. Shoulder/scapular pain with L UPA T4-5    (+) Spurling on L  (+) Distraction  Compression in flexion provided relief  Median, ulnar nerve neural provocation tests (-) B     Reassessment sign: cervical rotation 50 deg B at start of visit    Assessment: Symptoms more highly irritable today than at recent visits, with UE symptoms seemingly sensitive to tensioning movements in particular. While neural provocation testing was negative at previous visits, distribution and pattern of symptoms is consistent with neuropathic pain. Will consider neural mobilization at next visit to lessen overall irritability as response to cervical interventions is variable.     Goals:  (to be met in 10 visits)   Pt will improve cervical AROM lateral flexion to 30 degrees to improve tolerance household activities  Pt will improve cervical AROM rotation to >65 degrees to improve tolerance for turning head to check blind spot while driving  Pt will have improved cervical PA mobility to WNL to improve cervical ROM as well as promote upright posturing and decreased pain with working on the computer   Pt will report no greater than 6/10 neck and L UE pain to improve tolerance to daily activities   Pt will be independent and compliant with comprehensive HEP to maintain progress achieved in PT        Plan: Reassess UE neural provocation testing, consider neural mobilization. Date: 6/9/2023  TX#: 2/10 Date: 6/12/2023            TX#: 3/10 Date:  6/14/2023           TX#: 4/10 Date:  6/26/2023                TX#: 5/10 Date:    Tx#: 6/   Therapeutic Exercise Edu: MRI findings, anatomy/pathology  Objective testing  Cervical rotation SNAG x10 B  HEP update Cervical retraction at wall 5s x 10  Prone scapular retraction 2 x 12  HEP update Seated cervical retractions 2x10  Supine cervical retractions performed in slight cervical extension, DC'd due to increasing peripheralisation  Prone scapular retraction 2 x 12 Seated cervical retractions x10 // increased neck pain   Seated thoracic extension over chair x10 // increased neck/shoulder pain  HEP modification    Neuromuscular Re-education        Therapeutic Activity        Manual Therapy L UPA C6-7 Gr III+ // 60 deg B cervical rotation AROM with min pain L UPA C6-7 Gr III+ medially directed // 73 deg B cervical rotation AROM with min pain  T3-4 prone rotation thrust mobilization Gr V L UPA C6-7 Gr III+ medially directed  L sideglides C6-7 Gr III+  PROM L cervical rot in mid range, DC'd due to increasing peripheralisation L UPA C6-7 Gr III+ medially directed // 60 deg B cervical rotation AROM with min pain  L sideglides C6-7 Gr III+ // no change in ROM, mild increase in pain  Cervical distraction Gr III- // increased L arm pain during    HEP: cervical retraction, rotation SNAG      Charges: MT 2, TE 1       Total Timed Treatment: MT 24 min, TE 16 min  Total Treatment Time: 40 min

## 2023-06-29 ENCOUNTER — OFFICE VISIT (OUTPATIENT)
Dept: PHYSICAL THERAPY | Facility: HOSPITAL | Age: 53
End: 2023-06-29
Attending: INTERNAL MEDICINE
Payer: COMMERCIAL

## 2023-06-29 PROCEDURE — 97140 MANUAL THERAPY 1/> REGIONS: CPT

## 2023-06-29 PROCEDURE — 97110 THERAPEUTIC EXERCISES: CPT

## 2023-07-03 ENCOUNTER — OFFICE VISIT (OUTPATIENT)
Dept: PHYSICAL THERAPY | Facility: HOSPITAL | Age: 53
End: 2023-07-03
Attending: INTERNAL MEDICINE
Payer: COMMERCIAL

## 2023-07-03 PROCEDURE — 97140 MANUAL THERAPY 1/> REGIONS: CPT

## 2023-07-03 PROCEDURE — 97110 THERAPEUTIC EXERCISES: CPT

## 2023-07-07 ENCOUNTER — OFFICE VISIT (OUTPATIENT)
Dept: PHYSICAL THERAPY | Facility: HOSPITAL | Age: 53
End: 2023-07-07
Attending: INTERNAL MEDICINE
Payer: COMMERCIAL

## 2023-07-07 PROCEDURE — 97140 MANUAL THERAPY 1/> REGIONS: CPT

## 2023-07-07 PROCEDURE — 97110 THERAPEUTIC EXERCISES: CPT

## 2023-07-07 NOTE — PROGRESS NOTES
Diagnosis:   Left cervical radiculopathy (M54.12)      Referring Provider: Adin Barriga  Date of Evaluation:    6/5/2023    Precautions:  Cancer Next MD visit:   none scheduled  Date of Surgery: n/a   Insurance Primary/Secondary: [address] [place], [place] [address]O / N/A     # Auth Visits: 12 visits by 8/30/23            Subjective: Patient reports soreness after last visit for about 1 1/2 days, just in her neck. Feels she gets the most relief with the pressure right on the spot of the neck pain. Pain: 4/10 (resting)      Objective:     Initial Evaluation   Subjective *  Turning her head to the L while driving, prolonged computer work, looking up, house cleaning    Objective *  Cervical AROM    Extension: 50    Sidebending: R 10; L 18 *neck and UE pain with OP mid- and low cervical    Rotation: R 50; L 50    Quadrant: R WNL; L 25% limited *neck pain    Lower cervical quadrant: R 25% limited; L 25% limited *neck pain  PAIVM: Hypomobile L UPA C4-5, C5-6, C6-7. *Neck pain at C6-7 > C5-6. Shoulder/scapular pain with L UPA T4-5    (+) Spurling on L  (+) Distraction  Compression in flexion provided relief  Median, ulnar nerve neural provocation tests (-) B         Assessment: Positive within-visit response to PA mobilization which most successfully modulates pain. Higher grade mobilization performed today at longer duration which resulted in improved mobility at that segment.        Goals:  (to be met in 10 visits)   Pt will improve cervical AROM lateral flexion to 30 degrees to improve tolerance household activities  Pt will improve cervical AROM rotation to >65 degrees to improve tolerance for turning head to check blind spot while driving  Pt will have improved cervical PA mobility to WNL to improve cervical ROM as well as promote upright posturing and decreased pain with working on the computer   Pt will report no greater than 6/10 neck and L UE pain to improve tolerance to daily activities   Pt will be independent and compliant with comprehensive HEP to maintain progress achieved in PT        Plan: Continue to address cervical and upper thoracic mobility. Date: 6/9/2023  TX#: 2/10 Date: 6/12/2023   TX#: 3/10 Date:  6/14/2023    TX#: 4/10 Date:  6/26/2023     TX#: 5/10 Date: 6/29/2023   Tx#: 6/10 Date: 7/3/2023   Tx#: 7/10 Date: 7/7/2023   Tx#: 8/10   Therapeutic Exercise Edu: MRI findings, anatomy/pathology  Objective testing  Cervical rotation SNAG x10 B  HEP update Cervical retraction at wall 5s x 10  Prone scapular retraction 2 x 12  HEP update Seated cervical retractions 2x10  Supine cervical retractions performed in slight cervical extension, DC'd due to increasing peripheralisation  Prone scapular retraction 2 x 12 Seated cervical retractions x10 // increased neck pain   Seated thoracic extension over chair x10 // increased neck/shoulder pain  HEP modification Seated cervical retractions w/L lateral flexion isometric 2 x 10  Cervical L rotation SNAG with self-L UPA pressure C6-7 x10  HEP update Upper thoracic extension seated PPIVM  HEP update HEP review  Functional movement reassessment    Neuromuscular Re-education          Therapeutic Activity          Manual Therapy L UPA C6-7 Gr III+ // 60 deg B cervical rotation AROM with min pain L UPA C6-7 Gr III+ medially directed // 73 deg B cervical rotation AROM with min pain  T3-4 prone rotation thrust mobilization Gr V L UPA C6-7 Gr III+ medially directed  L sideglides C6-7 Gr III+  PROM L cervical rot in mid range, DC'd due to increasing peripheralisation L UPA C6-7 Gr III+ medially directed // 60 deg B cervical rotation AROM with min pain  L sideglides C6-7 Gr III+ // no change in ROM, mild increase in pain  Cervical distraction Gr III- // increased L arm pain during L UPA C6-7 Gr III- (1st bout);  Gr III+ (2nd bout) medially directed // 0/10 resting pain in neck  T3-4, T4-5 prone rotation Gr IV+ > V  Seated CT junction distraction thrust mobilization    Cervical distraction (oscillatory) Gr III- (2 bouts) // 70 deg cervical rotation with 6/10 pain, 0/10 resting pain (lasted ~90 sec before pain returned) L UPA C6-7 Gr III+ medially and inferiorly directed (1st bout);  Gr III++ (2nd bout) straight PA // 0/10 resting pain in neck, min pain with L rotation    HEP: cervical retraction, rotation SNAG      Charges: MT 2, TE 1       Total Timed Treatment: MT 32 min, TE 8 min  Total Treatment Time: 40 min

## 2023-07-10 ENCOUNTER — APPOINTMENT (OUTPATIENT)
Dept: PHYSICAL THERAPY | Facility: HOSPITAL | Age: 53
End: 2023-07-10
Attending: INTERNAL MEDICINE
Payer: COMMERCIAL

## 2023-07-12 ENCOUNTER — OFFICE VISIT (OUTPATIENT)
Dept: PHYSICAL THERAPY | Facility: HOSPITAL | Age: 53
End: 2023-07-12
Attending: INTERNAL MEDICINE
Payer: COMMERCIAL

## 2023-07-12 PROCEDURE — 97140 MANUAL THERAPY 1/> REGIONS: CPT

## 2023-07-12 PROCEDURE — 97110 THERAPEUTIC EXERCISES: CPT

## 2023-07-12 NOTE — PROGRESS NOTES
Diagnosis:   Left cervical radiculopathy (M54.12)      Referring Provider: Barbra Loja  Date of Evaluation:    6/5/2023    Precautions:  Cancer Next MD visit:   none scheduled  Date of Surgery: n/a   Insurance Primary/Secondary: [employer] O / N/A     # Auth Visits: 12 visits by 8/30/23            Subjective: Patient reports the last two days have been really painful, she has been taking Advil/Tylenol consistently to manage. She felt the last treatment helped for a couple days after, but then the pain came back. Pain: 5-6/10        Objective:     Initial Evaluation   Subjective *  Turning her head to the L while driving, prolonged computer work, looking up, house cleaning    Objective *  Cervical AROM    Extension: 50    Sidebending: R 10; L 18 *neck and UE pain with OP mid- and low cervical    Rotation: R 50; L 50    Quadrant: R WNL; L 25% limited *neck pain    Lower cervical quadrant: R 25% limited; L 25% limited *neck pain  PAIVM: Hypomobile L UPA C4-5, C5-6, C6-7. *Neck pain at C6-7 > C5-6. Shoulder/scapular pain with L UPA T4-5    (+) Spurling on L  (+) Distraction  Compression in flexion provided relief  Median, ulnar nerve neural provocation tests (-) B         Assessment: Continued longer duration, high grade mobilization at the lower cervical spine. Discussed parameters for HEP to ensure frequent exercise/motion is not exacerbating symptoms between visits.       Goals:  (to be met in 10 visits)   Pt will improve cervical AROM lateral flexion to 30 degrees to improve tolerance household activities  Pt will improve cervical AROM rotation to >65 degrees to improve tolerance for turning head to check blind spot while driving  Pt will have improved cervical PA mobility to WNL to improve cervical ROM as well as promote upright posturing and decreased pain with working on the computer   Pt will report no greater than 6/10 neck and L UE pain to improve tolerance to daily activities   Pt will be independent and compliant with comprehensive HEP to maintain progress achieved in PT        Plan: Continue to address cervical and upper thoracic mobility. Date: 6/9/2023  TX#: 2/10 Date: 6/12/2023   TX#: 3/10 Date:  6/14/2023    TX#: 4/10 Date:  6/26/2023     TX#: 5/10 Date: 6/29/2023   Tx#: 6/10 Date: 7/3/2023   Tx#: 7/10 Date: 7/7/2023   Tx#: 8/10 Date: 7/12/2023   Tx#: 9/10   Therapeutic Exercise Edu: MRI findings, anatomy/pathology  Objective testing  Cervical rotation SNAG x10 B  HEP update Cervical retraction at wall 5s x 10  Prone scapular retraction 2 x 12  HEP update Seated cervical retractions 2x10  Supine cervical retractions performed in slight cervical extension, DC'd due to increasing peripheralisation  Prone scapular retraction 2 x 12 Seated cervical retractions x10 // increased neck pain   Seated thoracic extension over chair x10 // increased neck/shoulder pain  HEP modification Seated cervical retractions w/L lateral flexion isometric 2 x 10  Cervical L rotation SNAG with self-L UPA pressure C6-7 x10  HEP update Upper thoracic extension seated PPIVM  HEP update HEP review  Functional movement reassessment  Upper thoracic extension seated PPIVM  Cervical extension SNAG x15  HEP review   Neuromuscular Re-education           Therapeutic Activity           Manual Therapy L UPA C6-7 Gr III+ // 60 deg B cervical rotation AROM with min pain L UPA C6-7 Gr III+ medially directed // 73 deg B cervical rotation AROM with min pain  T3-4 prone rotation thrust mobilization Gr V L UPA C6-7 Gr III+ medially directed  L sideglides C6-7 Gr III+  PROM L cervical rot in mid range, DC'd due to increasing peripheralisation L UPA C6-7 Gr III+ medially directed // 60 deg B cervical rotation AROM with min pain  L sideglides C6-7 Gr III+ // no change in ROM, mild increase in pain  Cervical distraction Gr III- // increased L arm pain during L UPA C6-7 Gr III- (1st bout);  Gr III+ (2nd bout) medially directed // 0/10 resting pain in neck  T3-4, T4-5 prone rotation Gr IV+ > V  Seated CT junction distraction thrust mobilization    Cervical distraction (oscillatory) Gr III- (2 bouts) // 70 deg cervical rotation with 6/10 pain, 0/10 resting pain (lasted ~90 sec before pain returned) L UPA C6-7 Gr III+ medially and inferiorly directed (1st bout);  Gr III++ (2nd bout) straight PA // 0/10 resting pain in neck, min pain with L rotation  L UPA C6-7 Gr III+ medially and inferiorly directed   HEP: cervical retraction, rotation SNAG      Charges: MT 2, TE 1       Total Timed Treatment: MT 23 min, TE 15 min  Total Treatment Time: 38 min

## 2023-07-14 ENCOUNTER — APPOINTMENT (OUTPATIENT)
Dept: PHYSICAL THERAPY | Facility: HOSPITAL | Age: 53
End: 2023-07-14
Attending: INTERNAL MEDICINE
Payer: COMMERCIAL

## 2023-07-19 ENCOUNTER — OFFICE VISIT (OUTPATIENT)
Dept: PHYSICAL THERAPY | Facility: HOSPITAL | Age: 53
End: 2023-07-19
Attending: INTERNAL MEDICINE
Payer: COMMERCIAL

## 2023-07-19 PROCEDURE — 97140 MANUAL THERAPY 1/> REGIONS: CPT

## 2023-07-19 PROCEDURE — 97110 THERAPEUTIC EXERCISES: CPT

## 2023-07-19 NOTE — PROGRESS NOTES
Diagnosis:   Left cervical radiculopathy (M54.12)      Referring Provider: Aaron Dennis  Date of Evaluation:    6/5/2023    Precautions:  Cancer Next MD visit:   none scheduled  Date of Surgery: n/a    Progress Summary  Pt has attended 10 visits in Physical Therapy. Insurance Primary/Secondary: [employer] O / N/A     # Auth Visits: 12 visits by 8/30/23            Subjective: Patient reports she sees only slight improvement in symptoms since beginning PT. The symptoms don't go all the way to her fingertips anymore, are mostly in her neck though today she is feeling pain into her shoulder and elbow. The intensity of the pain is the same, and it is constant. Pain: 7-8/10        Objective:     Initial Evaluation Reassessment 7/19/2023    Subjective *  Turning her head to the L while driving, prolonged computer work, looking up, house cleaning    Objective *  Cervical AROM    Extension: 50    Sidebending: R 10; L 18 *neck and UE pain with OP mid- and low cervical    Rotation: R 50; L 50    Quadrant: R WNL; L 25% limited *neck pain    Lower cervical quadrant: R 25% limited; L 25% limited *neck pain  PAIVM: Hypomobile L UPA C4-5, C5-6, C6-7. *Neck pain at C6-7 > C5-6. Shoulder/scapular pain with L UPA T4-5    (+) Spurling on L  (+) Distraction  Compression in flexion provided relief  Median, ulnar nerve neural provocation tests (-) B Cervical AROM    Extension: 50    Sidebending: R 25; L 20 *UE pain with OP mid- and low cervical    Rotation: R 60; L 60    Quadrant: R WNL; L 25% limited *neck pain    (-) Distraction         Assessment: Patient's overall progress has been limited in physical therapy, particularly in terms of pain intensity. Objectively cervical ROM has improved and symptoms have largely centralized, however she continues to experience persistent and intense neck pain that is not responsive to manual therapy or exercise for more than short durations.  Plan to trial cervical traction at next visit, as this may be an option for home if it provides symptom relief. Continued skilled physical therapy is medically necessary to promote normal ROM, strength, and motor control to facilitate patient's return to prior level of function. Goals:  (to be met in 10 visits)   Pt will improve cervical AROM lateral flexion to 30 degrees to improve tolerance household activities  Pt will improve cervical AROM rotation to >65 degrees to improve tolerance for turning head to check blind spot while driving  Pt will have improved cervical PA mobility to WNL to improve cervical ROM as well as promote upright posturing and decreased pain with working on the computer   Pt will report no greater than 6/10 neck and L UE pain to improve tolerance to daily activities   Pt will be independent and compliant with comprehensive HEP to maintain progress achieved in PT        Plan: Trial mechanical traction next visit. Patient to make follow-up appointment with PCP, may benefit from further medical intervention for pain management.    Date: 6/12/2023   TX#: 3/10 Date:  6/14/2023    TX#: 4/10 Date:  6/26/2023     TX#: 5/10 Date: 6/29/2023   Tx#: 6/10 Date: 7/3/2023   Tx#: 7/10 Date: 7/7/2023   Tx#: 8/10 Date: 7/12/2023   Tx#: 9/10 Date: 7/19/2023   Tx#: 10/10   Therapeutic Exercise Cervical retraction at wall 5s x 10  Prone scapular retraction 2 x 12  HEP update Seated cervical retractions 2x10  Supine cervical retractions performed in slight cervical extension, DC'd due to increasing peripheralisation  Prone scapular retraction 2 x 12 Seated cervical retractions x10 // increased neck pain   Seated thoracic extension over chair x10 // increased neck/shoulder pain  HEP modification Seated cervical retractions w/L lateral flexion isometric 2 x 10  Cervical L rotation SNAG with self-L UPA pressure C6-7 x10  HEP update Upper thoracic extension seated PPIVM  HEP update HEP review  Functional movement reassessment  Upper thoracic extension seated PPIVM  Cervical extension SNAG x15  HEP review Reassessment for PN  Seated cervical retractions x10 // into pain-relieving range, increased neck pain to 8-9/10 following  Edu: POC   Neuromuscular Re-education           Therapeutic Activity           Manual Therapy L UPA C6-7 Gr III+ medially directed // 73 deg B cervical rotation AROM with min pain  T3-4 prone rotation thrust mobilization Gr V L UPA C6-7 Gr III+ medially directed  L sideglides C6-7 Gr III+  PROM L cervical rot in mid range, DC'd due to increasing peripheralisation L UPA C6-7 Gr III+ medially directed // 60 deg B cervical rotation AROM with min pain  L sideglides C6-7 Gr III+ // no change in ROM, mild increase in pain  Cervical distraction Gr III- // increased L arm pain during L UPA C6-7 Gr III- (1st bout); Gr III+ (2nd bout) medially directed // 0/10 resting pain in neck  T3-4, T4-5 prone rotation Gr IV+ > V  Seated CT junction distraction thrust mobilization    Cervical distraction (oscillatory) Gr III- (2 bouts) // 70 deg cervical rotation with 6/10 pain, 0/10 resting pain (lasted ~90 sec before pain returned) L UPA C6-7 Gr III+ medially and inferiorly directed (1st bout);  Gr III++ (2nd bout) straight PA // 0/10 resting pain in neck, min pain with L rotation  L UPA C6-7 Gr III+ medially and inferiorly directed Gross cervical L side glide Gr III-- // dec to 6/10 resting pain   HEP: cervical retraction, rotation SNAG      Charges: MT 1, TE 2      Total Timed Treatment: MT 16 min, TE 24 min  Total Treatment Time: 40 min

## 2023-07-25 ENCOUNTER — OFFICE VISIT (OUTPATIENT)
Dept: INTERNAL MEDICINE CLINIC | Facility: CLINIC | Age: 53
End: 2023-07-25

## 2023-07-25 ENCOUNTER — LAB ENCOUNTER (OUTPATIENT)
Dept: LAB | Age: 53
End: 2023-07-25
Attending: INTERNAL MEDICINE
Payer: COMMERCIAL

## 2023-07-25 VITALS
SYSTOLIC BLOOD PRESSURE: 129 MMHG | DIASTOLIC BLOOD PRESSURE: 77 MMHG | BODY MASS INDEX: 31.34 KG/M2 | HEART RATE: 83 BPM | HEIGHT: 66 IN | WEIGHT: 195 LBS

## 2023-07-25 DIAGNOSIS — Z85.71 H/O HODGKIN'S LYMPHOMA: ICD-10-CM

## 2023-07-25 DIAGNOSIS — Z00.00 PHYSICAL EXAM: ICD-10-CM

## 2023-07-25 DIAGNOSIS — Z12.31 VISIT FOR SCREENING MAMMOGRAM: ICD-10-CM

## 2023-07-25 DIAGNOSIS — M54.12 LEFT CERVICAL RADICULOPATHY: Primary | ICD-10-CM

## 2023-07-25 LAB
ALBUMIN SERPL-MCNC: 4 G/DL (ref 3.4–5)
ALBUMIN/GLOB SERPL: 1.1 {RATIO} (ref 1–2)
ALP LIVER SERPL-CCNC: 69 U/L
ALT SERPL-CCNC: 46 U/L
ANION GAP SERPL CALC-SCNC: 8 MMOL/L (ref 0–18)
AST SERPL-CCNC: 30 U/L (ref 15–37)
BASOPHILS # BLD AUTO: 0.08 X10(3) UL (ref 0–0.2)
BASOPHILS NFR BLD AUTO: 0.9 %
BILIRUB SERPL-MCNC: 0.4 MG/DL (ref 0.1–2)
BUN BLD-MCNC: 11 MG/DL (ref 7–18)
BUN/CREAT SERPL: 9.9 (ref 10–20)
CALCIUM BLD-MCNC: 9.3 MG/DL (ref 8.5–10.1)
CHLORIDE SERPL-SCNC: 111 MMOL/L (ref 98–112)
CHOLEST SERPL-MCNC: 211 MG/DL (ref ?–200)
CO2 SERPL-SCNC: 21 MMOL/L (ref 21–32)
CREAT BLD-MCNC: 1.11 MG/DL
DEPRECATED RDW RBC AUTO: 41.2 FL (ref 35.1–46.3)
EGFRCR SERPLBLD CKD-EPI 2021: 60 ML/MIN/1.73M2 (ref 60–?)
EOSINOPHIL # BLD AUTO: 0.17 X10(3) UL (ref 0–0.7)
EOSINOPHIL NFR BLD AUTO: 1.8 %
ERYTHROCYTE [DISTWIDTH] IN BLOOD BY AUTOMATED COUNT: 12.4 % (ref 11–15)
EST. AVERAGE GLUCOSE BLD GHB EST-MCNC: 108 MG/DL (ref 68–126)
FASTING PATIENT LIPID ANSWER: YES
FASTING STATUS PATIENT QL REPORTED: YES
FOLATE SERPL-MCNC: >20 NG/ML (ref 8.7–?)
GLOBULIN PLAS-MCNC: 3.8 G/DL (ref 2.8–4.4)
GLUCOSE BLD-MCNC: 101 MG/DL (ref 70–99)
HBA1C MFR BLD: 5.4 % (ref ?–5.7)
HCT VFR BLD AUTO: 47 %
HDLC SERPL-MCNC: 38 MG/DL (ref 40–59)
HGB BLD-MCNC: 16 G/DL
IMM GRANULOCYTES # BLD AUTO: 0.04 X10(3) UL (ref 0–1)
IMM GRANULOCYTES NFR BLD: 0.4 %
LDLC SERPL CALC-MCNC: 105 MG/DL (ref ?–100)
LYMPHOCYTES # BLD AUTO: 3.25 X10(3) UL (ref 1–4)
LYMPHOCYTES NFR BLD AUTO: 34.9 %
MCH RBC QN AUTO: 31.1 PG (ref 26–34)
MCHC RBC AUTO-ENTMCNC: 34 G/DL (ref 31–37)
MCV RBC AUTO: 91.3 FL
MONOCYTES # BLD AUTO: 0.58 X10(3) UL (ref 0.1–1)
MONOCYTES NFR BLD AUTO: 6.2 %
NEUTROPHILS # BLD AUTO: 5.19 X10 (3) UL (ref 1.5–7.7)
NEUTROPHILS # BLD AUTO: 5.19 X10(3) UL (ref 1.5–7.7)
NEUTROPHILS NFR BLD AUTO: 55.8 %
NONHDLC SERPL-MCNC: 173 MG/DL (ref ?–130)
OSMOLALITY SERPL CALC.SUM OF ELEC: 290 MOSM/KG (ref 275–295)
PLATELET # BLD AUTO: 326 10(3)UL (ref 150–450)
POTASSIUM SERPL-SCNC: 4.2 MMOL/L (ref 3.5–5.1)
PROT SERPL-MCNC: 7.8 G/DL (ref 6.4–8.2)
RBC # BLD AUTO: 5.15 X10(6)UL
SODIUM SERPL-SCNC: 140 MMOL/L (ref 136–145)
T4 FREE SERPL-MCNC: 0.9 NG/DL (ref 0.8–1.7)
TRIGL SERPL-MCNC: 396 MG/DL (ref 30–149)
TSI SER-ACNC: 0.91 MIU/ML (ref 0.36–3.74)
VIT B12 SERPL-MCNC: 489 PG/ML (ref 193–986)
VIT D+METAB SERPL-MCNC: 42.3 NG/ML (ref 30–100)
VLDLC SERPL CALC-MCNC: 69 MG/DL (ref 0–30)
WBC # BLD AUTO: 9.3 X10(3) UL (ref 4–11)

## 2023-07-25 PROCEDURE — 80053 COMPREHEN METABOLIC PANEL: CPT

## 2023-07-25 PROCEDURE — 3074F SYST BP LT 130 MM HG: CPT | Performed by: INTERNAL MEDICINE

## 2023-07-25 PROCEDURE — 84443 ASSAY THYROID STIM HORMONE: CPT

## 2023-07-25 PROCEDURE — 3008F BODY MASS INDEX DOCD: CPT | Performed by: INTERNAL MEDICINE

## 2023-07-25 PROCEDURE — 82746 ASSAY OF FOLIC ACID SERUM: CPT

## 2023-07-25 PROCEDURE — 3078F DIAST BP <80 MM HG: CPT | Performed by: INTERNAL MEDICINE

## 2023-07-25 PROCEDURE — 82306 VITAMIN D 25 HYDROXY: CPT

## 2023-07-25 PROCEDURE — 80061 LIPID PANEL: CPT

## 2023-07-25 PROCEDURE — 36415 COLL VENOUS BLD VENIPUNCTURE: CPT

## 2023-07-25 PROCEDURE — 99213 OFFICE O/P EST LOW 20 MIN: CPT | Performed by: INTERNAL MEDICINE

## 2023-07-25 PROCEDURE — 83036 HEMOGLOBIN GLYCOSYLATED A1C: CPT

## 2023-07-25 PROCEDURE — 84439 ASSAY OF FREE THYROXINE: CPT

## 2023-07-25 PROCEDURE — 82607 VITAMIN B-12: CPT

## 2023-07-25 PROCEDURE — 85025 COMPLETE CBC W/AUTO DIFF WBC: CPT

## 2023-07-25 RX ORDER — MELATONIN
1000 DAILY
COMMUNITY

## 2023-07-28 ENCOUNTER — OFFICE VISIT (OUTPATIENT)
Dept: PHYSICAL THERAPY | Facility: HOSPITAL | Age: 53
End: 2023-07-28
Attending: INTERNAL MEDICINE
Payer: COMMERCIAL

## 2023-07-28 PROCEDURE — 97140 MANUAL THERAPY 1/> REGIONS: CPT

## 2023-07-28 PROCEDURE — 97110 THERAPEUTIC EXERCISES: CPT

## 2023-07-28 NOTE — PROGRESS NOTES
Diagnosis:   Left cervical radiculopathy (M54.12)      Referring Provider: Cr Smith  Date of Evaluation:    6/5/2023    Precautions:  Cancer Next MD visit:   none scheduled  Date of Surgery: n/a   Insurance Primary/Secondary: [employer]ot O / N/A     # Auth Visits: 12 visits by 8/30/23            Subjective: Patient reports she followed up with her PCP who referred her to physiatry. She reports the symptoms fluctuate - some days are better than others. Reports turning her head to the L is still the motion that triggers symptoms consistently. Pain: 4-5/10        Objective:     Initial Evaluation Reassessment 7/19/2023    Subjective *  Turning her head to the L while driving, prolonged computer work, looking up, house cleaning    Objective *  Cervical AROM    Extension: 50    Sidebending: R 10; L 18 *neck and UE pain with OP mid- and low cervical    Rotation: R 50; L 50    Quadrant: R WNL; L 25% limited *neck pain    Lower cervical quadrant: R 25% limited; L 25% limited *neck pain  PAIVM: Hypomobile L UPA C4-5, C5-6, C6-7. *Neck pain at C6-7 > C5-6. Shoulder/scapular pain with L UPA T4-5    (+) Spurling on L  (+) Distraction  Compression in flexion provided relief  Median, ulnar nerve neural provocation tests (-) B Cervical AROM    Extension: 50    Sidebending: R 25; L 20 *UE pain with OP mid- and low cervical    Rotation: R 60; L 60    Quadrant: R WNL; L 25% limited *neck pain    (-) Distraction         Assessment: Trialed higher volume of cervical traction to address persistent symptoms likely originating from the lower cervical spine. No symptoms reported throughout and no rebound soreness between bouts or at end of visit, though will need to assess longer-term response.         Goals:  (to be met in 10 visits)   Pt will improve cervical AROM lateral flexion to 30 degrees to improve tolerance household activities  Pt will improve cervical AROM rotation to >65 degrees to improve tolerance for turning head to check blind spot while driving  Pt will have improved cervical PA mobility to WNL to improve cervical ROM as well as promote upright posturing and decreased pain with working on the computer   Pt will report no greater than 6/10 neck and L UE pain to improve tolerance to daily activities   Pt will be independent and compliant with comprehensive HEP to maintain progress achieved in PT        Plan: Assess response to cervical traction.    Date:  6/14/2023    TX#: 4/10 Date:  6/26/2023     TX#: 5/10 Date: 6/29/2023   Tx#: 6/10 Date: 7/3/2023   Tx#: 7/10 Date: 7/7/2023   Tx#: 8/10 Date: 7/12/2023   Tx#: 9/10 Date: 7/19/2023   Tx#: 10/10 Date: 7/26/2023   Tx#: 11/12   Therapeutic Exercise Seated cervical retractions 2x10  Supine cervical retractions performed in slight cervical extension, DC'd due to increasing peripheralisation  Prone scapular retraction 2 x 12 Seated cervical retractions x10 // increased neck pain   Seated thoracic extension over chair x10 // increased neck/shoulder pain  HEP modification Seated cervical retractions w/L lateral flexion isometric 2 x 10  Cervical L rotation SNAG with self-L UPA pressure C6-7 x10  HEP update Upper thoracic extension seated PPIVM  HEP update HEP review  Functional movement reassessment  Upper thoracic extension seated PPIVM  Cervical extension SNAG x15  HEP review Reassessment for PN  Seated cervical retractions x10 // into pain-relieving range, increased neck pain to 8-9/10 following  Edu: POC Mechanical cervical traction 8 x 1 min in flexion  Edu: symptom monitoring, POC   Neuromuscular Re-education           Therapeutic Activity           Manual Therapy L UPA C6-7 Gr III+ medially directed  L sideglides C6-7 Gr III+  PROM L cervical rot in mid range, DC'd due to increasing peripheralisation L UPA C6-7 Gr III+ medially directed // 60 deg B cervical rotation AROM with min pain  L sideglides C6-7 Gr III+ // no change in ROM, mild increase in pain  Cervical distraction Gr III- // increased L arm pain during L UPA C6-7 Gr III- (1st bout); Gr III+ (2nd bout) medially directed // 0/10 resting pain in neck  T3-4, T4-5 prone rotation Gr IV+ > V  Seated CT junction distraction thrust mobilization    Cervical distraction (oscillatory) Gr III- (2 bouts) // 70 deg cervical rotation with 6/10 pain, 0/10 resting pain (lasted ~90 sec before pain returned) L UPA C6-7 Gr III+ medially and inferiorly directed (1st bout);  Gr III++ (2nd bout) straight PA // 0/10 resting pain in neck, min pain with L rotation  L UPA C6-7 Gr III+ medially and inferiorly directed Gross cervical L side glide Gr III-- // dec to 6/10 resting pain Cervical distraction Gr III- (sustained) on/off for 1 minute bouts   HEP: cervical retraction, rotation SNAG      Charges: MT 1, TE 2      Total Timed Treatment: MT 18 min, TE 24 min  Total Treatment Time: 42 min

## 2023-07-31 ENCOUNTER — OFFICE VISIT (OUTPATIENT)
Dept: PHYSICAL THERAPY | Facility: HOSPITAL | Age: 53
End: 2023-07-31
Attending: INTERNAL MEDICINE
Payer: COMMERCIAL

## 2023-07-31 PROCEDURE — 97110 THERAPEUTIC EXERCISES: CPT

## 2023-07-31 PROCEDURE — 97140 MANUAL THERAPY 1/> REGIONS: CPT

## 2023-07-31 NOTE — PROGRESS NOTES
Diagnosis:   Left cervical radiculopathy (M54.12)      Referring Provider: Lupillo Del Rio  Date of Evaluation:    6/5/2023    Precautions:  Cancer Next MD visit:   none scheduled  Date of Surgery: n/a   Insurance Primary/Secondary: [address] [place] [address] / N/A     # Auth Visits: 12 visits by 8/30/23            Subjective: Patient reports she felt fine for a few hours after last visit, but that evening felt increased pain. It eventually settled down yesterday, and she isn't feeling too bad today. Pain: 4/10        Objective:     Initial Evaluation Reassessment 7/19/2023    Subjective *  Turning her head to the L while driving, prolonged computer work, looking up, house cleaning    Objective *  Cervical AROM    Extension: 50    Sidebending: R 10; L 18 *neck and UE pain with OP mid- and low cervical    Rotation: R 50; L 50    Quadrant: R WNL; L 25% limited *neck pain    Lower cervical quadrant: R 25% limited; L 25% limited *neck pain  PAIVM: Hypomobile L UPA C4-5, C5-6, C6-7. *Neck pain at C6-7 > C5-6. Shoulder/scapular pain with L UPA T4-5    (+) Spurling on L  (+) Distraction  Compression in flexion provided relief  Median, ulnar nerve neural provocation tests (-) B Cervical AROM    Extension: 50    Sidebending: R 25; L 20 *UE pain with OP mid- and low cervical    Rotation: R 60; L 60    Quadrant: R WNL; L 25% limited *neck pain    (-) Distraction         Assessment: Deferred manual therapy at the lower cervical spine and addressed potential contributing impairments to excess strain on this region; 1st rib mobilization did elicit radiating pain to the L shoulder though this may be related to local hyperalgesia. Patient to consult with physiatry to discuss additional options for pain management given limited long-term response to PT interventions.         Goals:  (to be met in 10 visits)   Pt will improve cervical AROM lateral flexion to 30 degrees to improve tolerance household activities  Pt will improve cervical AROM rotation to >65 degrees to improve tolerance for turning head to check blind spot while driving  Pt will have improved cervical PA mobility to WNL to improve cervical ROM as well as promote upright posturing and decreased pain with working on the computer   Pt will report no greater than 6/10 neck and L UE pain to improve tolerance to daily activities   Pt will be independent and compliant with comprehensive HEP to maintain progress achieved in PT        Plan: Patient to follow up with physiatry to discuss pain management options. Date:  6/26/2023     TX#: 5/10 Date: 6/29/2023   Tx#: 6/10 Date: 7/3/2023   Tx#: 7/10 Date: 7/7/2023   Tx#: 8/10 Date: 7/12/2023   Tx#: 9/10 Date: 7/19/2023   Tx#: 10/10 Date: 7/26/2023   Tx#: 11/12 Date: 7/31/2023   Tx#: 12/12   Therapeutic Exercise Seated cervical retractions x10 // increased neck pain   Seated thoracic extension over chair x10 // increased neck/shoulder pain  HEP modification Seated cervical retractions w/L lateral flexion isometric 2 x 10  Cervical L rotation SNAG with self-L UPA pressure C6-7 x10  HEP update Upper thoracic extension seated PPIVM  HEP update HEP review  Functional movement reassessment  Upper thoracic extension seated PPIVM  Cervical extension SNAG x15  HEP review Reassessment for PN  Seated cervical retractions x10 // into pain-relieving range, increased neck pain to 8-9/10 following  Edu: POC Mechanical cervical traction 8 x 1 min in flexion  Edu: symptom monitoring, POC Edu: POC, HEP review   Neuromuscular Re-education           Therapeutic Activity           Manual Therapy L UPA C6-7 Gr III+ medially directed // 60 deg B cervical rotation AROM with min pain  L sideglides C6-7 Gr III+ // no change in ROM, mild increase in pain  Cervical distraction Gr III- // increased L arm pain during L UPA C6-7 Gr III- (1st bout);  Gr III+ (2nd bout) medially directed // 0/10 resting pain in neck  T3-4, T4-5 prone rotation Gr IV+ > V  Seated CT junction distraction thrust mobilization    Cervical distraction (oscillatory) Gr III- (2 bouts) // 70 deg cervical rotation with 6/10 pain, 0/10 resting pain (lasted ~90 sec before pain returned) L UPA C6-7 Gr III+ medially and inferiorly directed (1st bout);  Gr III++ (2nd bout) straight PA // 0/10 resting pain in neck, min pain with L rotation  L UPA C6-7 Gr III+ medially and inferiorly directed Gross cervical L side glide Gr III-- // dec to 6/10 resting pain Cervical distraction Gr III- (sustained) on/off for 1 minute bouts 1st rib caudal glide L Gr III > III- (ease off)  SO release 6 min   HEP: cervical retraction, rotation SNAG      Charges: MT 2, TE 1      Total Timed Treatment: MT 24 min, TE 16 min  Total Treatment Time: 40 min

## 2023-08-12 ENCOUNTER — HOSPITAL ENCOUNTER (EMERGENCY)
Facility: HOSPITAL | Age: 53
Discharge: HOME | End: 2023-08-12
Attending: EMERGENCY MEDICINE
Payer: COMMERCIAL

## 2023-08-12 ENCOUNTER — APPOINTMENT (OUTPATIENT)
Dept: GENERAL RADIOLOGY | Facility: HOSPITAL | Age: 53
End: 2023-08-12
Attending: EMERGENCY MEDICINE
Payer: COMMERCIAL

## 2023-08-12 VITALS
HEART RATE: 87 BPM | SYSTOLIC BLOOD PRESSURE: 140 MMHG | WEIGHT: 192 LBS | DIASTOLIC BLOOD PRESSURE: 89 MMHG | TEMPERATURE: 98 F | BODY MASS INDEX: 30.86 KG/M2 | HEIGHT: 66 IN | RESPIRATION RATE: 18 BRPM | OXYGEN SATURATION: 97 %
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
## 2023-08-12 DIAGNOSIS — S20.211A CONTUSION OF RIB ON RIGHT SIDE, INITIAL ENCOUNTER: ICD-10-CM

## 2023-08-12 PROCEDURE — 93010 ELECTROCARDIOGRAM REPORT: CPT

## 2023-08-12 PROCEDURE — 96372 THER/PROPH/DIAG INJ SC/IM: CPT

## 2023-08-12 PROCEDURE — 93005 ELECTROCARDIOGRAM TRACING: CPT

## 2023-08-12 PROCEDURE — 99285 EMERGENCY DEPT VISIT HI MDM: CPT

## 2023-08-12 PROCEDURE — 99284 EMERGENCY DEPT VISIT MOD MDM: CPT

## 2023-08-12 PROCEDURE — 71101 X-RAY EXAM UNILAT RIBS/CHEST: CPT | Performed by: EMERGENCY MEDICINE

## 2023-08-12 RX ORDER — HYDROCODONE BITARTRATE AND ACETAMINOPHEN 5; 325 MG/1; MG/1
1 TABLET ORAL EVERY 6 HOURS PRN
Qty: 10 TABLET | Refills: 0 | Status: SHIPPED | OUTPATIENT
Start: 2023-08-12 | End: 2023-08-19

## 2023-08-12 RX ORDER — KETOROLAC TROMETHAMINE 10 MG/1
10 TABLET, FILM COATED ORAL EVERY 6 HOURS PRN
Qty: 20 TABLET | Refills: 0 | Status: SHIPPED | OUTPATIENT
Start: 2023-08-12 | End: 2023-08-19

## 2023-08-12 RX ORDER — KETOROLAC TROMETHAMINE 30 MG/ML
30 INJECTION, SOLUTION INTRAMUSCULAR; INTRAVENOUS ONCE
Status: COMPLETED | OUTPATIENT
Start: 2023-08-12 | End: 2023-08-12

## 2023-08-12 NOTE — ED INITIAL ASSESSMENT (HPI)
Patient arrives ambulatory through triage with complaints of a fall down 5 stairs yesterday. Patient reports difficulty taking in a deep breath since then. Patient denies any head injury.

## 2023-08-13 LAB
ATRIAL RATE: 85 BPM
P AXIS: 74 DEGREES
P-R INTERVAL: 132 MS
Q-T INTERVAL: 376 MS
QRS DURATION: 78 MS
QTC CALCULATION (BEZET): 447 MS
R AXIS: 76 DEGREES
T AXIS: 69 DEGREES
VENTRICULAR RATE: 85 BPM

## 2023-08-17 ENCOUNTER — TELEPHONE (OUTPATIENT)
Dept: CASE MANAGEMENT | Age: 53
End: 2023-08-17

## 2023-08-17 DIAGNOSIS — L98.9 SKIN LESION OF BACK: Primary | ICD-10-CM

## 2023-08-17 NOTE — TELEPHONE ENCOUNTER
Dr. Denise Cole,     The patient is requesting a referral to an in network dermatologist for skin growths on back. Pended referral please review diagnosis and sign off if you agree. Thank you.   Hank Wills

## 2023-09-05 ENCOUNTER — HOSPITAL ENCOUNTER (OUTPATIENT)
Age: 53
Discharge: HOME | End: 2023-09-05
Payer: COMMERCIAL

## 2023-09-05 VITALS
SYSTOLIC BLOOD PRESSURE: 147 MMHG | DIASTOLIC BLOOD PRESSURE: 88 MMHG | HEART RATE: 89 BPM | RESPIRATION RATE: 18 BRPM | OXYGEN SATURATION: 97 % | TEMPERATURE: 99 F

## 2023-09-05 DIAGNOSIS — J01.40 ACUTE NON-RECURRENT PANSINUSITIS: Primary | ICD-10-CM

## 2023-09-05 PROCEDURE — 99213 OFFICE O/P EST LOW 20 MIN: CPT | Performed by: NURSE PRACTITIONER

## 2023-09-05 RX ORDER — AMOXICILLIN AND CLAVULANATE POTASSIUM 875; 125 MG/1; MG/1
1 TABLET, FILM COATED ORAL 2 TIMES DAILY
Qty: 14 TABLET | Refills: 0 | Status: SHIPPED | OUTPATIENT
Start: 2023-09-05 | End: 2023-09-12

## 2023-09-05 RX ORDER — FLUTICASONE PROPIONATE 50 MCG
2 SPRAY, SUSPENSION (ML) NASAL DAILY
Qty: 16 G | Refills: 0 | Status: SHIPPED | OUTPATIENT
Start: 2023-09-05

## 2023-09-05 NOTE — DISCHARGE INSTRUCTIONS
Follow up with your doctor as needed or ENT    Take Augmentin two times a day for 7 days. Increase water intake to help with cough and loosen mucous. Use flonase daily to help with sinus drainage, congestion and headaches. Add on Claritin or Zyrtec to help with itchy, watery eyes, runny nose and drainage.     RETURN for fever > 102, vomiting, dizziness, chest pain, shortness of breath

## 2023-09-05 NOTE — ED INITIAL ASSESSMENT (HPI)
Patient is here with sinus pressure and headache for over a week and she states it is getting worse.

## 2023-09-28 ENCOUNTER — OFFICE VISIT (OUTPATIENT)
Dept: HEMATOLOGY/ONCOLOGY | Facility: HOSPITAL | Age: 53
End: 2023-09-28
Attending: INTERNAL MEDICINE
Payer: COMMERCIAL

## 2023-09-28 VITALS
OXYGEN SATURATION: 95 % | TEMPERATURE: 99 F | SYSTOLIC BLOOD PRESSURE: 131 MMHG | WEIGHT: 191.19 LBS | DIASTOLIC BLOOD PRESSURE: 75 MMHG | BODY MASS INDEX: 29.66 KG/M2 | HEART RATE: 87 BPM | RESPIRATION RATE: 16 BRPM | HEIGHT: 67.25 IN

## 2023-09-28 DIAGNOSIS — Z08 ENCOUNTER FOR FOLLOW-UP SURVEILLANCE OF HODGKIN'S DISEASE: Primary | ICD-10-CM

## 2023-09-28 DIAGNOSIS — Z85.71 ENCOUNTER FOR FOLLOW-UP SURVEILLANCE OF HODGKIN'S DISEASE: Primary | ICD-10-CM

## 2023-09-28 DIAGNOSIS — Z87.891 HISTORY OF TOBACCO USE: ICD-10-CM

## 2023-09-28 PROCEDURE — 99204 OFFICE O/P NEW MOD 45 MIN: CPT | Performed by: INTERNAL MEDICINE

## 2024-01-31 ENCOUNTER — LAB ENCOUNTER (OUTPATIENT)
Dept: LAB | Age: 54
End: 2024-01-31
Attending: INTERNAL MEDICINE
Payer: COMMERCIAL

## 2024-01-31 ENCOUNTER — OFFICE VISIT (OUTPATIENT)
Dept: INTERNAL MEDICINE CLINIC | Facility: CLINIC | Age: 54
End: 2024-01-31
Payer: COMMERCIAL

## 2024-01-31 VITALS
SYSTOLIC BLOOD PRESSURE: 111 MMHG | HEART RATE: 73 BPM | DIASTOLIC BLOOD PRESSURE: 70 MMHG | BODY MASS INDEX: 29.94 KG/M2 | WEIGHT: 193 LBS | HEIGHT: 67.25 IN

## 2024-01-31 DIAGNOSIS — Z12.31 VISIT FOR SCREENING MAMMOGRAM: ICD-10-CM

## 2024-01-31 DIAGNOSIS — Z72.0 TOBACCO ABUSE: ICD-10-CM

## 2024-01-31 DIAGNOSIS — R91.8 LUNG NODULES: ICD-10-CM

## 2024-01-31 DIAGNOSIS — Z12.4 SCREENING FOR CERVICAL CANCER: ICD-10-CM

## 2024-01-31 DIAGNOSIS — M79.672 FOOT PAIN, LEFT: ICD-10-CM

## 2024-01-31 DIAGNOSIS — Z85.71 H/O HODGKIN'S LYMPHOMA: ICD-10-CM

## 2024-01-31 DIAGNOSIS — Z00.00 PHYSICAL EXAM: ICD-10-CM

## 2024-01-31 DIAGNOSIS — K80.20 CALCULUS OF GALLBLADDER WITHOUT CHOLECYSTITIS WITHOUT OBSTRUCTION: ICD-10-CM

## 2024-01-31 DIAGNOSIS — Z00.00 PHYSICAL EXAM: Primary | ICD-10-CM

## 2024-01-31 DIAGNOSIS — Z23 NEED FOR TETANUS, DIPHTHERIA, AND ACELLULAR PERTUSSIS (TDAP) VACCINE: ICD-10-CM

## 2024-01-31 DIAGNOSIS — Z12.11 SCREENING FOR COLON CANCER: ICD-10-CM

## 2024-01-31 LAB
ALBUMIN SERPL-MCNC: 4.4 G/DL (ref 3.2–4.8)
ALBUMIN/GLOB SERPL: 1.4 {RATIO} (ref 1–2)
ALP LIVER SERPL-CCNC: 69 U/L
ALT SERPL-CCNC: 37 U/L
ANION GAP SERPL CALC-SCNC: 6 MMOL/L (ref 0–18)
AST SERPL-CCNC: 39 U/L (ref ?–34)
BASOPHILS # BLD AUTO: 0.09 X10(3) UL (ref 0–0.2)
BASOPHILS NFR BLD AUTO: 1 %
BILIRUB SERPL-MCNC: 0.6 MG/DL (ref 0.3–1.2)
BUN BLD-MCNC: 10 MG/DL (ref 9–23)
BUN/CREAT SERPL: 9.5 (ref 10–20)
CALCIUM BLD-MCNC: 9.8 MG/DL (ref 8.7–10.4)
CHLORIDE SERPL-SCNC: 109 MMOL/L (ref 98–112)
CHOLEST SERPL-MCNC: 202 MG/DL (ref ?–200)
CO2 SERPL-SCNC: 25 MMOL/L (ref 21–32)
CREAT BLD-MCNC: 1.05 MG/DL
DEPRECATED RDW RBC AUTO: 41 FL (ref 35.1–46.3)
EGFRCR SERPLBLD CKD-EPI 2021: 64 ML/MIN/1.73M2 (ref 60–?)
EOSINOPHIL # BLD AUTO: 0.21 X10(3) UL (ref 0–0.7)
EOSINOPHIL NFR BLD AUTO: 2.2 %
ERYTHROCYTE [DISTWIDTH] IN BLOOD BY AUTOMATED COUNT: 12.5 % (ref 11–15)
EST. AVERAGE GLUCOSE BLD GHB EST-MCNC: 111 MG/DL (ref 68–126)
FASTING PATIENT LIPID ANSWER: YES
FASTING STATUS PATIENT QL REPORTED: YES
FOLATE SERPL-MCNC: >24 NG/ML (ref 5.4–?)
GLOBULIN PLAS-MCNC: 3.2 G/DL (ref 2.8–4.4)
GLUCOSE BLD-MCNC: 94 MG/DL (ref 70–99)
HBA1C MFR BLD: 5.5 % (ref ?–5.7)
HCT VFR BLD AUTO: 45.9 %
HDLC SERPL-MCNC: 34 MG/DL (ref 40–59)
HGB BLD-MCNC: 15.6 G/DL
IMM GRANULOCYTES # BLD AUTO: 0.02 X10(3) UL (ref 0–1)
IMM GRANULOCYTES NFR BLD: 0.2 %
LDLC SERPL CALC-MCNC: 110 MG/DL (ref ?–100)
LYMPHOCYTES # BLD AUTO: 4.16 X10(3) UL (ref 1–4)
LYMPHOCYTES NFR BLD AUTO: 44.2 %
MCH RBC QN AUTO: 30.1 PG (ref 26–34)
MCHC RBC AUTO-ENTMCNC: 34 G/DL (ref 31–37)
MCV RBC AUTO: 88.4 FL
MONOCYTES # BLD AUTO: 0.55 X10(3) UL (ref 0.1–1)
MONOCYTES NFR BLD AUTO: 5.8 %
NEUTROPHILS # BLD AUTO: 4.38 X10 (3) UL (ref 1.5–7.7)
NEUTROPHILS # BLD AUTO: 4.38 X10(3) UL (ref 1.5–7.7)
NEUTROPHILS NFR BLD AUTO: 46.6 %
NONHDLC SERPL-MCNC: 168 MG/DL (ref ?–130)
OSMOLALITY SERPL CALC.SUM OF ELEC: 289 MOSM/KG (ref 275–295)
PLATELET # BLD AUTO: 360 10(3)UL (ref 150–450)
POTASSIUM SERPL-SCNC: 4.4 MMOL/L (ref 3.5–5.1)
PROT SERPL-MCNC: 7.6 G/DL (ref 5.7–8.2)
RBC # BLD AUTO: 5.19 X10(6)UL
SODIUM SERPL-SCNC: 140 MMOL/L (ref 136–145)
T4 FREE SERPL-MCNC: 1 NG/DL (ref 0.8–1.7)
TRIGL SERPL-MCNC: 338 MG/DL (ref 30–149)
TSI SER-ACNC: 1.69 MIU/ML (ref 0.55–4.78)
VIT B12 SERPL-MCNC: 719 PG/ML (ref 211–911)
VIT D+METAB SERPL-MCNC: 41.4 NG/ML (ref 30–100)
VLDLC SERPL CALC-MCNC: 59 MG/DL (ref 0–30)
WBC # BLD AUTO: 9.4 X10(3) UL (ref 4–11)

## 2024-01-31 PROCEDURE — 36415 COLL VENOUS BLD VENIPUNCTURE: CPT

## 2024-01-31 PROCEDURE — 84439 ASSAY OF FREE THYROXINE: CPT

## 2024-01-31 PROCEDURE — 82306 VITAMIN D 25 HYDROXY: CPT

## 2024-01-31 PROCEDURE — 80061 LIPID PANEL: CPT

## 2024-01-31 PROCEDURE — 80053 COMPREHEN METABOLIC PANEL: CPT

## 2024-01-31 PROCEDURE — 85025 COMPLETE CBC W/AUTO DIFF WBC: CPT

## 2024-01-31 PROCEDURE — 82746 ASSAY OF FOLIC ACID SERUM: CPT

## 2024-01-31 PROCEDURE — 84443 ASSAY THYROID STIM HORMONE: CPT

## 2024-01-31 PROCEDURE — 82607 VITAMIN B-12: CPT

## 2024-01-31 PROCEDURE — 83036 HEMOGLOBIN GLYCOSYLATED A1C: CPT

## 2024-02-05 PROBLEM — K80.20 CALCULUS OF GALLBLADDER WITHOUT CHOLECYSTITIS WITHOUT OBSTRUCTION: Status: RESOLVED | Noted: 2022-06-28 | Resolved: 2024-02-05

## 2024-02-06 ENCOUNTER — HOSPITAL ENCOUNTER (OUTPATIENT)
Dept: MAMMOGRAPHY | Age: 54
Discharge: HOME | End: 2024-02-06
Attending: INTERNAL MEDICINE
Payer: COMMERCIAL

## 2024-02-06 ENCOUNTER — LAB ENCOUNTER (OUTPATIENT)
Dept: LAB | Age: 54
End: 2024-02-06
Attending: INTERNAL MEDICINE
Payer: COMMERCIAL

## 2024-02-06 DIAGNOSIS — Z12.31 VISIT FOR SCREENING MAMMOGRAM: ICD-10-CM

## 2024-02-06 DIAGNOSIS — Z00.00 PHYSICAL EXAM: ICD-10-CM

## 2024-02-06 LAB
ATRIAL RATE: 71 BPM
BILIRUB UR QL: NEGATIVE
COLOR UR: YELLOW
GLUCOSE UR-MCNC: NORMAL MG/DL
HGB UR QL STRIP.AUTO: NEGATIVE
KETONES UR-MCNC: NEGATIVE MG/DL
LEUKOCYTE ESTERASE UR QL STRIP.AUTO: NEGATIVE
NITRITE UR QL STRIP.AUTO: NEGATIVE
P AXIS: 71 DEGREES
P-R INTERVAL: 136 MS
PH UR: 5 [PH] (ref 5–8)
PROT UR-MCNC: NEGATIVE MG/DL
Q-T INTERVAL: 402 MS
QRS DURATION: 84 MS
QTC CALCULATION (BEZET): 436 MS
R AXIS: 62 DEGREES
SP GR UR STRIP: 1.02 (ref 1–1.03)
T AXIS: 53 DEGREES
UROBILINOGEN UR STRIP-ACNC: NORMAL
VENTRICULAR RATE: 71 BPM

## 2024-02-06 PROCEDURE — 81001 URINALYSIS AUTO W/SCOPE: CPT

## 2024-02-06 PROCEDURE — 93010 ELECTROCARDIOGRAM REPORT: CPT | Performed by: STUDENT IN AN ORGANIZED HEALTH CARE EDUCATION/TRAINING PROGRAM

## 2024-02-06 PROCEDURE — 93005 ELECTROCARDIOGRAM TRACING: CPT

## 2024-02-06 PROCEDURE — 77063 BREAST TOMOSYNTHESIS BI: CPT | Performed by: INTERNAL MEDICINE

## 2024-02-06 PROCEDURE — 77067 SCR MAMMO BI INCL CAD: CPT | Performed by: INTERNAL MEDICINE

## 2024-02-06 NOTE — PROGRESS NOTES
HPI:    Patient ID: Rupinder Trujillo is a 53 year old female.  Physical exam   Review health screening    Dizziness  Pertinent negatives include no abdominal pain, chest pain, chills, coughing, fatigue, fever, headaches, joint swelling, nausea, rash, sore throat, vomiting or weakness.   Hypertension  Pertinent negatives include no chest pain, headaches, palpitations or shortness of breath.       Follow up   Feeling dizzy  Blood pressure elevated  at one point      PMH  followed by Oncology DR Arnold   Most recent notes  Rupinder Trujillo is a 53 year old female that was seen at the time of consultation in the [facility] for evaluation of the above condition. 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 Rupinder was last seen by my colleague Dr. Ramirez in July 2016 for this condition. Patient is noted to have been treated at the [facility] in [place], [place] with ABVD for 6 months of chemotherapy which did not include radiation. Patient underwent a CT chest in 6/2016 where she was noted to have a stable LLL pulmonary nodule, 0.3cm with no new nodules and recommended for annual follow up. Rupinder has been following with Dr. Bajwa in pulmonary for the pulmonary nodule. Her last CT chest imaging was in 9/2021 where the LLL nodule was 0.6cm but with the greater than 2 yr interval of stability, this was be consistent with a benign nodule.  Additional subcentimeter pulmonary micronodules were reported as unchanged dating back to 2016 consistent with benign nodules including a 0.5 cm groundglass nodule in the left lung apex. Patient reports being in her usual state of health over the last 6 to 12 months. She endorses some hot flashes during the day and night, but no night sweats. Rupinder has no systemic signs of illness. Patient denies reports of bleeding, chest pain, dyspnea, nausea, vomiting, abdominal pain, with no neurological symptoms save for  previously known cervical radiculopathy symptoms being evaluated by physical therapy.    ) History of Hodgkin's Lymphoma     --low clinical concern for lymphoma recurrence as she had B symptoms at the time of diagnosis and currently without systemic signs of illness by H&P and prior labs noted in July, 2023  --this was Classical Hodgkin's lymphoma with Nodular Sclerosing type, so risk of recurrence after 10 years is very low  -- Patient was never treated with radiation therapy favorably  -- Reviewed NCCN guidelines for surveillance after 5 years based on history of classical Hodgkin's with nodular sclerosis type  -- As this patient is 27 years from diagnosis and treatment she is recommended for annual H&P for blood pressure management of cardiovascular risk factors which she is doing with her PCP at this time so does not need additional surveillance with oncology for these features.  Annual influenza and other vaccines as clinically indicated also via PCP     -- Reviewed this patient was recommended for stress testing/echo imaging at 10 yr intervals with cardiology (ie. 2006, 2016, 2026, etc.) so she should be referred to cardiology as she has not seen a provider in a while and was treated with anthracycline-based chemotherapy     --otherwise, continue with annual CBC, chemistry panel, lipid testing etc. with her PCP     --she should continue with annual mammograms as she is overdue for July,2023 mammogram which has already been ordered by her PCP     --Patient should undergo routine surveillance testing for colon cancer, cervical cancer, and potential lung cancer based on tobacco use.  Patient has never completed a colonoscopy so needs a baseline with subsequent colonoscopies pending that result     --as most of these surveillance recommendations are already in place by her PCP, she may return to clinic in the future as needed     2.) History of Tobacco Use     --rec the patient to quit smoking as she has returned  to this habit  --rec pt's PCP consider referring the pt to the smoking cessation program in pulmonary clinic for help and to review screening for lung cancer based on her tobacco use history     MDM: Low Risk; 45 min of time on this encounter reviewing history, prior imaging and counseling the ptatient     Ramon Ragsdale MD  [facility]  [facility]    /70 (BP Location: Right arm, Patient Position: Sitting, Cuff Size: large)   Pulse 73   Ht 5' 7.25\" (1.708 m)   Wt 193 lb (87.5 kg)   LMP 01/22/2017 (LMP Unknown)   BMI 30.00 kg/m²   Wt Readings from Last 6 Encounters:   01/31/24 193 lb (87.5 kg)   09/28/23 191 lb 3.2 oz (86.7 kg)   08/12/23 192 lb (87.1 kg)   07/25/23 195 lb (88.5 kg)   05/19/23 197 lb 9.6 oz (89.6 kg)   07/20/22 177 lb (80.3 kg)     Body mass index is 30 kg/m².  HGBA1C:    Lab Results   Component Value Date    A1C 5.5 01/31/2024    A1C 5.4 07/25/2023    A1C 5.1 07/11/2022     01/31/2024         Review of Systems   Constitutional:  Negative for activity change, chills, fatigue and fever.   HENT:  Negative for ear discharge, nosebleeds, postnasal drip, rhinorrhea, sinus pressure and sore throat.    Eyes:  Negative for pain, discharge and redness.   Respiratory:  Negative for cough, chest tightness, shortness of breath and wheezing.    Cardiovascular:  Negative for chest pain, palpitations and leg swelling.   Gastrointestinal:  Negative for abdominal pain, blood in stool, constipation, diarrhea, nausea and vomiting.   Genitourinary:  Negative for difficulty urinating, dysuria, frequency, hematuria and urgency.   Musculoskeletal:  Negative for back pain, gait problem and joint swelling.   Skin:  Negative for rash.   Neurological:  Positive for dizziness. Negative for syncope, weakness, light-headedness and headaches.   Psychiatric/Behavioral:  Negative for dysphoric mood. The patient is not nervous/anxious.          Current Outpatient  Medications   Medication Sig Dispense Refill    cyanocobalamin 1000 MCG Oral Tab Take 1 tablet (1,000 mcg total) by mouth daily.      Multiple Vitamin (MULTIVITAMIN ADULT OR) Take by mouth.      Cholecalciferol (VITAMIN D) 50 MCG (2000 UT) Oral Cap Take by mouth.       Allergies:  Allergies   Allergen Reactions    Entex JITTERY    Morphine OTHER (SEE COMMENTS)     Hyper and aggressive.       HISTORY:  Past Medical History:   Diagnosis Date    Arrhythmia     TACHYCARDIA     Deviated nasal bone     Disorder of liver     FATTY LIVER    Endometriosis     High cholesterol     Hodgkin lymphoma (HCC)     1996 tx with ABVD regimen (6 months) at [facility] in [place], [place]    Migraines     Personal history of antineoplastic chemotherapy     Tachycardia       Past Surgical History:   Procedure Laterality Date    CHEMOTHERAPY  1996    hodgkins lymphoma    LAPAROSCOPIC CHOLECYSTECTOMY  06/2022    no associated bleeding complications    EUSEBIA LOCALIZATION WIRE 1 SITE RIGHT (CPT=19281)      1985/86? done at age 17    NEEDLE BIOPSY RIGHT      REMOVAL OF OVARY(S)      2011 Left      Family History   Problem Relation Age of Onset    Heart Disorder Mother 82    Prostate Cancer Father 65    Glaucoma Neg     Macular degeneration Neg     Diabetes Neg     Bleeding Disorders Neg     DVT/VTE Neg       Social History:   Social History     Socioeconomic History    Marital status:    Tobacco Use    Smoking status: Every Day     Packs/day: 0.50     Years: 25.00     Additional pack years: 0.00     Total pack years: 12.50     Types: Cigarettes     Passive exposure: Current    Smokeless tobacco: Never   Vaping Use    Vaping Use: Former   Substance and Sexual Activity    Alcohol use: Yes     Alcohol/week: 2.0 standard drinks of alcohol     Types: 2 Standard drinks or equivalent per week     Comment: social    Drug use: No    Sexual activity: Yes     Partners: Male   Other Topics Concern    Caffeine Concern No    Reaction to local anesthetic  No   Social History Narrative    Rupinder is . She has no children. Patient works as an . She lives alone in [place], [place].        PHYSICAL EXAM:    Physical Exam  Constitutional:       General: She is not in acute distress.     Appearance: She is well-developed. She is obese. She is not diaphoretic.   HENT:      Head: Normocephalic and atraumatic.      Right Ear: Ear canal normal.      Left Ear: Ear canal normal.      Nose: Nose normal.      Mouth/Throat:      Pharynx: No oropharyngeal exudate or posterior oropharyngeal erythema.   Eyes:      General: No scleral icterus.        Right eye: No discharge.         Left eye: No discharge.      Conjunctiva/sclera: Conjunctivae normal.      Pupils: Pupils are equal, round, and reactive to light.   Cardiovascular:      Rate and Rhythm: Normal rate and regular rhythm.      Heart sounds: Normal heart sounds. No murmur heard.  Pulmonary:      Effort: Pulmonary effort is normal. No respiratory distress.      Breath sounds: Normal breath sounds. No wheezing.   Abdominal:      General: Bowel sounds are normal.      Palpations: Abdomen is soft. There is no mass.      Tenderness: There is no abdominal tenderness. There is no guarding or rebound.      Hernia: No hernia is present.   Musculoskeletal:         General: No tenderness.   Skin:     General: Skin is warm and dry.      Findings: No lesion or rash.   Neurological:      Mental Status: She is alert.      Gait: Gait normal.   Psychiatric:         Behavior: Behavior normal.         Thought Content: Thought content normal.              ASSESSMENT/PLAN:   (Z00.00) Physical exam  (primary encounter diagnosis)  Plan: CBC With Differential With Platelet, Comp         Metabolic Panel (14), Lipid Panel, Hemoglobin         A1C, TSH and Free T4, Urinalysis, Routine, EKG         12 Lead, Folic Acid Serum (Folate), Vitamin         B12, Vitamin D, OBG - INTERNAL     Generally healthy  54 y/o with hx of non Hodgekin Lymphoma  in remission  Healthy screening pap smear mammogram colon ca screen adivsed    (Z12.31) Visit for screening mammogram  Plan: [facility]BE 2D+3D SCREENING BILAT         (CPT=77067/59917)        .Breast exam.  Bilateral  No discrete palpable masses or tenderness    No nipple discharge  And no axillary adenopathy.  Self breast exam advised      (Z12.11) Screening for colon cancer  Plan: GASTRO - INTERNAL        Asymptomatic  monitor      (Z72.0) Tobacco abuse  Plan:chronic everyday smoker  Smoking cessation adivsed  12.5 pack year      (Z12.4) Screening for cervical cancer  Plan: OBG - INTERNAL        Follow up with gyne    (Z85.71) H/O Hodgkin's lymphoma  Plan: CARD ECHO 2D DOPPLER (CPT=93306), CARDIO -         INTERNAL        Post chemo   screening echo    (R91.8) Lung nodules  Plan: CT CHEST (CPT=71250)        CT chest ordered        (Z23) Need for tetanus, diphtheria, and acellular pertussis (Tdap) vaccine  Plan: TdaP (Boostrix) Vaccine (> 7 Y)            (M79.672) Foot pain, left  Plan: PODIATRY - INTERNAL        Referral given    Patient voiced understanding  and agrees with plan    Orders Placed This Encounter   Procedures    CBC With Differential With Platelet    Comp Metabolic Panel (14)    Lipid Panel    Hemoglobin A1C    TSH and Free T4    Urinalysis, Routine    Folic Acid Serum (Folate)    Vitamin B12    Vitamin D    TdaP (Boostrix) Vaccine (> 7 Y)       Meds This Visit:  Requested Prescriptions      No prescriptions requested or ordered in this encounter       Imaging & Referrals:  CARDIO - INTERNAL  GASTRO - INTERNAL  OBG - INTERNAL  PODIATRY - INTERNAL  TETANUS, DIPHTHERIA TOXOIDS AND ACELLULAR PERTUSIS VACCINE (TDAP), >7 YEARS, IM USE  EKG 12-LEAD  CARD ECHO 2D DOPPLER (CPT=93306)  [facility] BEBE 2D+3D SCREENING BILAT (CPT=77067/26337)  CT CHEST (CPT=71250)        ID#[id number]

## 2024-03-11 ENCOUNTER — HOSPITAL ENCOUNTER (OUTPATIENT)
Dept: CV DIAGNOSTICS | Facility: HOSPITAL | Age: 54
Discharge: HOME | End: 2024-03-11
Attending: INTERNAL MEDICINE
Payer: COMMERCIAL

## 2024-03-11 ENCOUNTER — HOSPITAL ENCOUNTER (OUTPATIENT)
Dept: CT IMAGING | Facility: HOSPITAL | Age: 54
Discharge: HOME | End: 2024-03-11
Attending: INTERNAL MEDICINE
Payer: COMMERCIAL

## 2024-03-11 DIAGNOSIS — R91.8 LUNG NODULES: ICD-10-CM

## 2024-03-11 DIAGNOSIS — Z85.71 H/O HODGKIN'S LYMPHOMA: ICD-10-CM

## 2024-03-11 PROCEDURE — 71250 CT THORAX DX C-: CPT | Performed by: INTERNAL MEDICINE

## 2024-03-11 PROCEDURE — 93306 TTE W/DOPPLER COMPLETE: CPT | Performed by: INTERNAL MEDICINE

## 2024-03-18 ENCOUNTER — OFFICE VISIT (OUTPATIENT)
Dept: OBGYN CLINIC | Facility: CLINIC | Age: 54
End: 2024-03-18

## 2024-03-18 VITALS
HEIGHT: 67 IN | WEIGHT: 191 LBS | HEART RATE: 82 BPM | DIASTOLIC BLOOD PRESSURE: 84 MMHG | BODY MASS INDEX: 29.98 KG/M2 | SYSTOLIC BLOOD PRESSURE: 152 MMHG

## 2024-03-18 DIAGNOSIS — Z12.4 ENCOUNTER FOR PAPANICOLAOU SMEAR FOR CERVICAL CANCER SCREENING: Primary | ICD-10-CM

## 2024-03-18 DIAGNOSIS — Z11.3 SCREENING EXAMINATION FOR STD (SEXUALLY TRANSMITTED DISEASE): ICD-10-CM

## 2024-03-18 DIAGNOSIS — N76.0 VAGINOSIS: ICD-10-CM

## 2024-03-18 PROCEDURE — 81514 NFCT DS BV&VAGINITIS DNA ALG: CPT | Performed by: OBSTETRICS & GYNECOLOGY

## 2024-03-18 PROCEDURE — 87491 CHLMYD TRACH DNA AMP PROBE: CPT | Performed by: OBSTETRICS & GYNECOLOGY

## 2024-03-18 PROCEDURE — 87591 N.GONORRHOEAE DNA AMP PROB: CPT | Performed by: OBSTETRICS & GYNECOLOGY

## 2024-03-18 PROCEDURE — 87624 HPV HI-RISK TYP POOLED RSLT: CPT | Performed by: OBSTETRICS & GYNECOLOGY

## 2024-03-18 NOTE — PROGRESS NOTES
HPI:    Patient ID: Rupinder Trjuillo is a 53 year old year old female.    HPI  Well woman visit  New patient to me  53-year-old postmenopausal female last menstrual period 2017, nulligravida  Last screening mammogram February of this year  Complains of noting postcoital milky discharge for about a week and then odor which is continued.  Past history of BV vaginitis.  All prior Pap tests were normal.  Gynecologic history with extensive endometriosis history and removal of an ovary as well as large laparotomy for endometriosis.  Review of Systems   Constitutional: Negative.    Cardiovascular: Negative.    Gastrointestinal: Negative.    Genitourinary: Negative.    Skin: Negative.    Neurological: Negative.    Psychiatric/Behavioral: Negative.     All other systems reviewed and are negative.       Current Outpatient Medications   Medication Sig Dispense Refill    cyanocobalamin 1000 MCG Oral Tab Take 1 tablet (1,000 mcg total) by mouth daily.      Multiple Vitamin (MULTIVITAMIN ADULT OR) Take by mouth.      Cholecalciferol (VITAMIN D) 50 MCG (2000 UT) Oral Cap Take by mouth.         Past Medical History:   Diagnosis Date    Arrhythmia     TACHYCARDIA     Deviated nasal bone     Disorder of liver     FATTY LIVER    Endometriosis     High cholesterol     Hodgkin lymphoma (HCC)     1996 tx with ABVD regimen (6 months) at [facility] in [place], [place]    Migraines     Personal history of antineoplastic chemotherapy     Tachycardia        Past Surgical History:   Procedure Laterality Date    CHEMOTHERAPY  1996    hodgkins lymphoma    LAPAROSCOPIC CHOLECYSTECTOMY  06/2022    no associated bleeding complications    EUSEBIA LOCALIZATION WIRE 1 SITE RIGHT (CPT=19281)      1985/86? done at age 17    NEEDLE BIOPSY RIGHT      REMOVAL OF OVARY(S)      2011 Left       Family History   Problem Relation Age of Onset    Heart Disorder Mother 82    Prostate Cancer Father 65    Cancer Self 25        lymphoma    Glaucoma Neg     Macular  degeneration Neg     Diabetes Neg     Bleeding Disorders Neg     DVT/VTE Neg        Social History     Socioeconomic History    Marital status:      Spouse name: Not on file    Number of children: Not on file    Years of education: Not on file    Highest education level: Not on file   Occupational History    Not on file   Tobacco Use    Smoking status: Every Day     Packs/day: 0.50     Years: 25.00     Additional pack years: 0.00     Total pack years: 12.50     Types: Cigarettes     Passive exposure: Current    Smokeless tobacco: Never   Vaping Use    Vaping Use: Former   Substance and Sexual Activity    Alcohol use: Yes     Alcohol/week: 2.0 standard drinks of alcohol     Types: 2 Standard drinks or equivalent per week     Comment: social    Drug use: No    Sexual activity: Yes     Partners: Male   Other Topics Concern     Service Not Asked    Blood Transfusions Not Asked    Caffeine Concern No    Occupational Exposure Not Asked    Hobby Hazards Not Asked    Sleep Concern Not Asked    Stress Concern Not Asked    Weight Concern Not Asked    Special Diet Not Asked    Back Care Not Asked    Exercise Not Asked    Bike Helmet Not Asked    Seat Belt Not Asked    Self-Exams Not Asked    Grew up on a farm Not Asked    History of tanning Not Asked    Outdoor occupation Not Asked    Breast feeding Not Asked    Reaction to local anesthetic No   Social History Narrative    Rupinder is . She has no children. Patient works as an . She lives alone in [place], [place].     Social Determinants of Health     Financial Resource Strain: Not on file   Food Insecurity: Not on file   Transportation Needs: Not on file   Physical Activity: Not on file   Stress: Not on file   Social Connections: Not on file   Housing Stability: Not on file       Physical Exam     Vitals: /84   Pulse 82   Ht 5' 7\" (1.702 m)   Wt 191 lb (86.6 kg)   LMP 01/22/2017 (LMP Unknown)   Breastfeeding No   BMI 29.91 kg/m²      Constitutional: She appears well-developed and well-nourished.     Musculoskeletal: Normal range of motion of upper and lower extremities.   Neurological: She is alert and oriented x 3.   Skin: Skin is warm without pallor.  Psychiatric: Her behavior is normal. Judgment normal.  Able to communicate verbally.    HEENT:  EOMI.  YANDY.  Sclera anicteric.    Head: Normocephalic.  Normal hair distribution.  No lesions.  Neck: Normal range of motion.      Adenopathy:  No supraclavicular or cervical adenopathy.  Thyroid:  Normal size, shape, and position.  No masses, tenderness, or nodules.  Cardiovascular: Normal rate and regular rhythm.    Pulmonary/Chest: Effort normal.   Abdominal: Soft. Normal appearance and bowel sounds are normal. She exhibits no mass. There is no hepatosplenomegaly. There is no tenderness. There is no rebound and no CVA tenderness. No hernia. Hernia negative in the ventral area,  negative in the right inguinal area and negative in the left inguinal area.   Lymphadenopathy:        Right: No inguinal adenopathy present.        Left: No inguinal adenopathy present.     Breasts:    Symmetric bilaterally.  Areolas without lesions.  Skin- normal without growths, lesions, erythema or peau d'orange change.  Nipples- without retraction or discharge.  No masses, lumps, skin changes, erythema, or lesions.  Axilla-  No adenopathy, mass, or tenderness.    Genitourinary:   Pelvic exam was performed with patient supine and chaperone present.  External genitalia- normal.  Bartholin's and Grimes's glands normal.  Urethral meatus- without lesions, mass, or discharge.  Urethra- normal without lesion, cyst, mass, or tenderness.  Vulva- normal.  Labia majora and minora without lesions.   Vagina- normal, no lesions; scant gray discharge.  Moist and well supported.  Bladder-  nontender.  No masses.  Normal support.  No evidence of cystocele,  abnormal bladder neck mobility or evident urinary incontinence.  Cervix-  smooth, normal epithelium without lesions or discharge.  No motion tenderness.   Uterus- normal size, shape, and contour.  Nontender.  No masses.  Adnexa-  Nontender, no masses.   Perineum- normal without lesions  Anus-  Normal appearing without lesions.    ASSESSMENT/PLAN:      ICD-10-CM    1. Encounter for Papanicolaou smear for cervical cancer screening  Z12.4 ThinPrep PAP Smear     Hpv Dna  High Risk , Thin Prep Collect      Well woman visit  Normal exam.  Pap test every 3 years if prior normal/-HPV.  Monthly self breast exam.  Annual screening mammograms.  Recommend screening colonoscopy at age 50, or as advised by GI.  Recommend dexascan for osteoporosis screening as indicated.  Recommend regular exercise and quality diet.  Return to clinic in one year or as needed.    Dong discharge with some odor-likely BV.  Await culture    Orders Placed This Encounter    Hpv Dna  High Risk , Thin Prep Collect     Standing Status:   Future     Standing Expiration Date:   3/18/2025     Order Specific Question:   HPV Genotyping Request (if HPV positive):     Answer:   Yes [1]     Order Specific Question:   Release to patient     Answer:   Immediate       Outpatient Encounter Medications as of 3/18/2024   Medication Sig Dispense Refill    cyanocobalamin 1000 MCG Oral Tab Take 1 tablet (1,000 mcg total) by mouth daily.      Multiple Vitamin (MULTIVITAMIN ADULT OR) Take by mouth.      Cholecalciferol (VITAMIN D) 50 MCG (2000 UT) Oral Cap Take by mouth.       No facility-administered encounter medications on file as of 3/18/2024.

## 2024-03-19 LAB
BV BACTERIA DNA VAG QL NAA+PROBE: POSITIVE
C GLABRATA DNA VAG QL NAA+PROBE: NEGATIVE
C KRUSEI DNA VAG QL NAA+PROBE: NEGATIVE
C TRACH DNA SPEC QL NAA+PROBE: NEGATIVE
CANDIDA DNA VAG QL NAA+PROBE: NEGATIVE
HPV I/H RISK 1 DNA SPEC QL NAA+PROBE: NEGATIVE
N GONORRHOEA DNA SPEC QL NAA+PROBE: NEGATIVE
T VAGINALIS DNA VAG QL NAA+PROBE: NEGATIVE

## 2024-03-20 ENCOUNTER — TELEPHONE (OUTPATIENT)
Dept: OBGYN CLINIC | Facility: CLINIC | Age: 54
End: 2024-03-20

## 2024-03-20 RX ORDER — METRONIDAZOLE 7.5 MG/G
1 GEL VAGINAL NIGHTLY
Qty: 70 G | Refills: 0 | Status: SHIPPED | OUTPATIENT
Start: 2024-03-20 | End: 2024-03-25

## 2024-03-20 NOTE — TELEPHONE ENCOUNTER
----- Message from Vamsi Kolb MD sent at 3/19/2024 11:56 AM CDT -----  Please inform patient that her vaginal culture was positive for bacterial vaginosis (BV).  This is due to an overgrowth of vaginal bacteria.  It is readily treated with an antibiotic tablet or cream.  We will be sending a treatment to her pharmacy.  If she is interested in pills, order Flagyl 500 mg PO BID x 7 days.  If she is interested in a vaginal gel, order Metrogel Vaginal 0.75% gel one applicatorful PV x 5 days.      Vamsi Kolb MD  3/20/2024  8:59 AM CDT Back to Top      Please inform by MyChart, mail, or call of normal laboratory tests-- cervical cultures screen chlamydia and gc

## 2024-03-20 NOTE — TELEPHONE ENCOUNTER
Patient verified name and     Discussed results and recommendations, patient wishes to try vaginal gel. Rx sent

## 2024-06-06 ENCOUNTER — OFFICE VISIT (OUTPATIENT)
Dept: FAMILY MEDICINE CLINIC | Facility: CLINIC | Age: 54
End: 2024-06-06
Payer: COMMERCIAL

## 2024-06-06 VITALS
WEIGHT: 193 LBS | BODY MASS INDEX: 30.29 KG/M2 | TEMPERATURE: 97 F | DIASTOLIC BLOOD PRESSURE: 87 MMHG | OXYGEN SATURATION: 97 % | HEIGHT: 67 IN | SYSTOLIC BLOOD PRESSURE: 132 MMHG | HEART RATE: 81 BPM | RESPIRATION RATE: 16 BRPM

## 2024-06-06 DIAGNOSIS — J01.00 ACUTE NON-RECURRENT MAXILLARY SINUSITIS: ICD-10-CM

## 2024-06-06 DIAGNOSIS — J01.00 ACUTE NON-RECURRENT MAXILLARY SINUSITIS: Primary | ICD-10-CM

## 2024-06-06 PROCEDURE — 3075F SYST BP GE 130 - 139MM HG: CPT | Performed by: NURSE PRACTITIONER

## 2024-06-06 PROCEDURE — 3008F BODY MASS INDEX DOCD: CPT | Performed by: NURSE PRACTITIONER

## 2024-06-06 PROCEDURE — 99213 OFFICE O/P EST LOW 20 MIN: CPT | Performed by: NURSE PRACTITIONER

## 2024-06-06 PROCEDURE — 3079F DIAST BP 80-89 MM HG: CPT | Performed by: NURSE PRACTITIONER

## 2024-06-06 RX ORDER — CETIRIZINE HYDROCHLORIDE 10 MG/1
10 TABLET ORAL DAILY
Qty: 30 TABLET | Refills: 0 | Status: SHIPPED | OUTPATIENT
Start: 2024-06-06 | End: 2024-07-06

## 2024-06-06 RX ORDER — AMOXICILLIN AND CLAVULANATE POTASSIUM 875; 125 MG/1; MG/1
1 TABLET, FILM COATED ORAL 2 TIMES DAILY
Qty: 14 TABLET | Refills: 0 | Status: SHIPPED | OUTPATIENT
Start: 2024-06-06 | End: 2024-06-13

## 2024-06-06 RX ORDER — GUAIFENESIN 600 MG/1
1200 TABLET, EXTENDED RELEASE ORAL 2 TIMES DAILY
Qty: 28 TABLET | Refills: 0 | Status: SHIPPED | OUTPATIENT
Start: 2024-06-06 | End: 2024-06-13

## 2024-06-06 RX ORDER — FLUTICASONE PROPIONATE 50 MCG
2 SPRAY, SUSPENSION (ML) NASAL DAILY
Qty: 1 EACH | Refills: 0 | Status: SHIPPED | OUTPATIENT
Start: 2024-06-06

## 2024-06-06 NOTE — PROGRESS NOTES
CHIEF COMPLAINT:     Chief Complaint   Patient presents with    Sinus Problem     Sinus pressure,headache, jaw pain,rt ear pain, SX x1 week  Denies sore throat, fever   OTC Cold and Cough Medication        HPI:   Rupinder Trujillo is a 53 year old female who presents for upper respiratory symptoms for  8 days. Patient reports sore throat only at the beginning of sx's, congestion, low grade fever, cough with yellow colored sputum, cough is keeping pt up at night, ear pain, sinus pain, prior history of sinusitis. Symptoms have been worsening since onset.  Treating symptoms with OTC cough and cold remedy. Negative COVID test at home. Received COVID vaccines 1 time, but pt states that she began to have heart palpitations after the first injection.  Denies shortness of breath, difficulty breathing, and chest pain    Current Outpatient Medications   Medication Sig Dispense Refill    amoxicillin clavulanate 875-125 MG Oral Tab Take 1 tablet by mouth 2 (two) times daily for 7 days. 14 tablet 0    fluticasone propionate 50 MCG/ACT Nasal Suspension 2 sprays by Each Nare route daily. 1 each 0    cetirizine 10 MG Oral Tab Take 1 tablet (10 mg total) by mouth daily. 30 tablet 0    guaiFENesin ER (MUCINEX) 600 MG Oral Tablet 12 Hr Take 2 tablets (1,200 mg total) by mouth 2 (two) times daily for 7 days. 28 tablet 0    cyanocobalamin 1000 MCG Oral Tab Take 1 tablet (1,000 mcg total) by mouth daily.      Multiple Vitamin (MULTIVITAMIN ADULT OR) Take by mouth.      Cholecalciferol (VITAMIN D) 50 MCG (2000 UT) Oral Cap Take by mouth.        Past Medical History:    Arrhythmia    TACHYCARDIA     Deviated nasal bone    Disorder of liver    FATTY LIVER    Endometriosis    High cholesterol    Hodgkin lymphoma (HCC)    1996 tx with ABVD regimen (6 months) at [facility] in [place], [place]    Migraines    Personal history of antineoplastic chemotherapy    Tachycardia      Past Surgical History:   Procedure Laterality Date    Chemotherapy   1996    hodgkins lymphoma    Laparoscopic cholecystectomy  06/2022    no associated bleeding complications    Dunia localization wire 1 site right (cpt=19281)      1985/86? done at age 17    Needle biopsy right      Removal of ovary(s)      2011 Left         Social History     Socioeconomic History    Marital status:    Tobacco Use    Smoking status: Every Day     Current packs/day: 0.50     Average packs/day: 0.5 packs/day for 25.0 years (12.5 ttl pk-yrs)     Types: Cigarettes     Passive exposure: Current    Smokeless tobacco: Never   Vaping Use    Vaping status: Former   Substance and Sexual Activity    Alcohol use: Yes     Alcohol/week: 2.0 standard drinks of alcohol     Types: 2 Standard drinks or equivalent per week     Comment: social    Drug use: No    Sexual activity: Yes     Partners: Male   Other Topics Concern    Caffeine Concern No    Reaction to local anesthetic No   Social History Narrative    Rupinder is . She has no children. Patient works as an . She lives alone in [place], [place].         REVIEW OF SYSTEMS:   Review of Systems   Constitutional:  Negative for chills and fever.   HENT:  Positive for congestion, ear pain, rhinorrhea, sinus pressure, sinus pain and sore throat. Negative for ear discharge and trouble swallowing.    Eyes:  Negative for discharge and redness.   Respiratory:  Positive for cough. Negative for chest tightness, shortness of breath and wheezing.    Cardiovascular:  Negative for chest pain and palpitations.   Gastrointestinal:  Negative for diarrhea, nausea and vomiting.   Musculoskeletal:  Negative for myalgias.   Skin:  Negative for rash.   Neurological:  Negative for headaches.   All other systems reviewed and are negative.       EXAM:   /87   Pulse 81   Temp 97.3 °F (36.3 °C)   Resp 16   Ht 5' 7\" (1.702 m)   Wt 193 lb (87.5 kg)   LMP 01/22/2017 (LMP Unknown)   SpO2 97%   BMI 30.23 kg/m²   Physical Exam  Vitals and nursing note reviewed.    Constitutional:       Appearance: Normal appearance. She is not ill-appearing.   HENT:      Head: Normocephalic and atraumatic.      Right Ear: Hearing, tympanic membrane, ear canal and external ear normal. No drainage. There is no impacted cerumen. Tympanic membrane is not injected, perforated, erythematous or bulging.      Left Ear: Hearing, tympanic membrane, ear canal and external ear normal. No drainage. There is no impacted cerumen. Tympanic membrane is not injected, perforated, erythematous or bulging.      Nose: Mucosal edema, congestion and rhinorrhea present.      Right Sinus: Maxillary sinus tenderness present. No frontal sinus tenderness.      Left Sinus: Maxillary sinus tenderness present. No frontal sinus tenderness.      Mouth/Throat:      Lips: No lesions.      Mouth: Mucous membranes are moist. No oral lesions.      Palate: No lesions.      Pharynx: Oropharynx is clear. Uvula midline. No oropharyngeal exudate or posterior oropharyngeal erythema.      Tonsils: No tonsillar exudate or tonsillar abscesses.   Eyes:      General: No scleral icterus.        Right eye: No discharge.         Left eye: No discharge.      Conjunctiva/sclera: Conjunctivae normal.   Cardiovascular:      Rate and Rhythm: Normal rate and regular rhythm.      Heart sounds: Normal heart sounds. No murmur heard.  Pulmonary:      Effort: Pulmonary effort is normal.      Breath sounds: Normal breath sounds. No wheezing.   Lymphadenopathy:      Cervical: No cervical adenopathy.   Skin:     General: Skin is warm and dry.   Neurological:      Mental Status: She is alert and oriented to person, place, and time.   Psychiatric:         Mood and Affect: Mood normal.         Behavior: Behavior normal.           ASSESSMENT AND PLAN:   Rupinder Trujillo is a 53 year old female who presents with upper respiratory symptoms that are consistent with    ASSESSMENT:   Encounter Diagnosis   Name Primary?    Acute non-recurrent maxillary sinusitis  Yes       PLAN: Meds as below.  Comfort care as described in Patient Instructions.  Urged patient to use a daily Flonase and Zyrtec routine to reduce inflammation and mucus production.  May also use Mucinex for cough.  If symptoms worsen such as shortness of breath, difficulty breathing, and/or chest pain, patient was instructed to report to the nearest emergency room for prompt evaluation and treatment.    Meds & Refills for this Visit:  Requested Prescriptions     Signed Prescriptions Disp Refills    amoxicillin clavulanate 875-125 MG Oral Tab 14 tablet 0     Sig: Take 1 tablet by mouth 2 (two) times daily for 7 days.    fluticasone propionate 50 MCG/ACT Nasal Suspension 1 each 0     Si sprays by Each Nare route daily.    cetirizine 10 MG Oral Tab 30 tablet 0     Sig: Take 1 tablet (10 mg total) by mouth daily.    guaiFENesin ER (MUCINEX) 600 MG Oral Tablet 12 Hr 28 tablet 0     Sig: Take 2 tablets (1,200 mg total) by mouth 2 (two) times daily for 7 days.     Risks, benefits, and side effects of medication explained and discussed.    The patient indicates understanding of these issues and agrees to the plan.  The patient is asked to f/u with PCP if sx's persist or worsen.

## 2024-06-07 RX ORDER — FLUTICASONE PROPIONATE 50 MCG
2 SPRAY, SUSPENSION (ML) NASAL DAILY
Qty: 48 G | Refills: 0 | OUTPATIENT
Start: 2024-06-07

## 2024-06-13 ENCOUNTER — NURSE TRIAGE (OUTPATIENT)
Dept: INTERNAL MEDICINE CLINIC | Facility: CLINIC | Age: 54
End: 2024-06-13

## 2024-06-13 RX ORDER — FLUCONAZOLE 150 MG/1
TABLET ORAL
Qty: 2 TABLET | Refills: 0 | Status: SHIPPED | OUTPATIENT
Start: 2024-06-13

## 2024-06-13 NOTE — TELEPHONE ENCOUNTER
Diflucan sent to the pharmacy.   Patient to follow up in office if symptoms do not resolve.     pt with hypotension, LGIB, rapid a. fib, will get labs., give IVF, activate code fusion

## 2024-06-13 NOTE — TELEPHONE ENCOUNTER
I called the patient and notified of below.  She states understanding and agrees to plan.     Will call if not resolving for visit. No other questions or concerns at this time.

## 2024-06-13 NOTE — TELEPHONE ENCOUNTER
Action Requested: Summary for Provider     []  Critical Lab, Recommendations Needed  [x] Need Additional Advice  []   FYI    []   Need Orders  [x] Need Medications Sent to Pharmacy  []  Other     SUMMARY: Patient stated that was seen in the walk in clinic on 2024 for a sinus infection and prescribed augmentin. The last 2 days has been having white vaginal discharge, vaginal itching and vaginal area is burning. No other symptoms. Working so not able to come in. Patient wanted to get diflucan prescribed for a yeast infection. Please advise.   Reason for call: Vaginal Problem (White vaginal discharge, vaginal itching, vaginal area burning)  Onset: 2024  Reason for Disposition   Symptoms of a yeast infection' (i.e., itchy, white discharge, not bad smelling) and not improved > 3 days following Care Advice    Protocols used: Vaginal Vcpexyepm-Z-AT    2024 walk in clinic notes:  ASSESSMENT:        Encounter Diagnosis   Name Primary?    Acute non-recurrent maxillary sinusitis Yes         PLAN: Meds as below.  Comfort care as described in Patient Instructions.  Urged patient to use a daily Flonase and Zyrtec routine to reduce inflammation and mucus production.  May also use Mucinex for cough.  If symptoms worsen such as shortness of breath, difficulty breathing, and/or chest pain, patient was instructed to report to the nearest emergency room for prompt evaluation and treatment.     Meds & Refills for this Visit:  Requested Prescriptions             Signed Prescriptions Disp Refills    amoxicillin clavulanate 875-125 MG Oral Tab 14 tablet 0       Sig: Take 1 tablet by mouth 2 (two) times daily for 7 days.    fluticasone propionate 50 MCG/ACT Nasal Suspension 1 each 0       Si sprays by Each Nare route daily.    cetirizine 10 MG Oral Tab 30 tablet 0       Sig: Take 1 tablet (10 mg total) by mouth daily.    guaiFENesin ER (MUCINEX) 600 MG Oral Tablet 12 Hr 28 tablet 0       Sig: Take 2 tablets (1,200 mg  total) by mouth 2 (two) times daily for 7 days.      Risks, benefits, and side effects of medication explained and discussed.     The patient indicates understanding of these issues and agrees to the plan.  The patient is asked to f/u with PCP if sx's persist or worsen.           Instructions      Return if symptoms worsen or fail to improve.

## 2024-09-15 ENCOUNTER — APPOINTMENT (OUTPATIENT)
Dept: GENERAL RADIOLOGY | Facility: HOSPITAL | Age: 54
End: 2024-09-15
Attending: EMERGENCY MEDICINE
Payer: COMMERCIAL

## 2024-09-15 ENCOUNTER — HOSPITAL ENCOUNTER (OUTPATIENT)
Facility: HOSPITAL | Age: 54
Setting detail: OBSERVATION
Discharge: HOME | End: 2024-09-15
Attending: EMERGENCY MEDICINE | Admitting: HOSPITALIST
Payer: COMMERCIAL

## 2024-09-15 ENCOUNTER — HOSPITAL ENCOUNTER (INPATIENT)
Facility: HOSPITAL | Age: 54
LOS: 1 days | Discharge: HOME | End: 2024-09-15
Attending: EMERGENCY MEDICINE | Admitting: HOSPITALIST
Payer: COMMERCIAL

## 2024-09-15 VITALS
BODY MASS INDEX: 29.03 KG/M2 | DIASTOLIC BLOOD PRESSURE: 92 MMHG | SYSTOLIC BLOOD PRESSURE: 152 MMHG | TEMPERATURE: 99 F | WEIGHT: 185 LBS | OXYGEN SATURATION: 97 % | HEART RATE: 77 BPM | HEIGHT: 67 IN | RESPIRATION RATE: 18 BRPM

## 2024-09-15 DIAGNOSIS — R07.9 CHEST PAIN WITH MODERATE RISK FOR CARDIAC ETIOLOGY: Primary | ICD-10-CM

## 2024-09-15 LAB
ANION GAP SERPL CALC-SCNC: 7 MMOL/L (ref 0–18)
APTT PPP: 22.7 SECONDS (ref 23–36)
BASOPHILS # BLD AUTO: 0.09 X10(3) UL (ref 0–0.2)
BASOPHILS NFR BLD AUTO: 1 %
BUN BLD-MCNC: 8 MG/DL (ref 9–23)
CALCIUM BLD-MCNC: 9.3 MG/DL (ref 8.7–10.4)
CHLORIDE SERPL-SCNC: 111 MMOL/L (ref 98–112)
CO2 SERPL-SCNC: 22 MMOL/L (ref 21–32)
CREAT BLD-MCNC: 0.97 MG/DL
D DIMER PPP FEU-MCNC: 0.35 UG/ML FEU (ref ?–0.53)
DEPRECATED RDW RBC AUTO: 41.1 FL (ref 35.1–46.3)
EGFRCR SERPLBLD CKD-EPI 2021: 70 ML/MIN/1.73M2 (ref 60–?)
EOSINOPHIL # BLD AUTO: 0.13 X10(3) UL (ref 0–0.7)
EOSINOPHIL NFR BLD AUTO: 1.4 %
ERYTHROCYTE [DISTWIDTH] IN BLOOD BY AUTOMATED COUNT: 12.4 % (ref 11–15)
GLUCOSE BLD-MCNC: 95 MG/DL (ref 70–99)
HCT VFR BLD AUTO: 45.3 %
HGB BLD-MCNC: 15.6 G/DL
IMM GRANULOCYTES # BLD AUTO: 0.04 X10(3) UL (ref 0–1)
IMM GRANULOCYTES NFR BLD: 0.4 %
INR BLD: 0.94 (ref 0.8–1.2)
LYMPHOCYTES # BLD AUTO: 3.16 X10(3) UL (ref 1–4)
LYMPHOCYTES NFR BLD AUTO: 34.1 %
MCH RBC QN AUTO: 31.2 PG (ref 26–34)
MCHC RBC AUTO-ENTMCNC: 34.4 G/DL (ref 31–37)
MCV RBC AUTO: 90.6 FL
MONOCYTES # BLD AUTO: 0.75 X10(3) UL (ref 0.1–1)
MONOCYTES NFR BLD AUTO: 8.1 %
NEUTROPHILS # BLD AUTO: 5.11 X10 (3) UL (ref 1.5–7.7)
NEUTROPHILS # BLD AUTO: 5.11 X10(3) UL (ref 1.5–7.7)
NEUTROPHILS NFR BLD AUTO: 55 %
NT-PROBNP SERPL-MCNC: 93 PG/ML (ref ?–125)
PLATELET # BLD AUTO: 393 10(3)UL (ref 150–450)
POTASSIUM SERPL-SCNC: 4.2 MMOL/L (ref 3.5–5.1)
PROTHROMBIN TIME: 13.2 SECONDS (ref 11.6–14.8)
RBC # BLD AUTO: 5 X10(6)UL
SODIUM SERPL-SCNC: 140 MMOL/L (ref 136–145)
TROPONIN I SERPL HS-MCNC: <3 NG/L
WBC # BLD AUTO: 9.3 X10(3) UL (ref 4–11)

## 2024-09-15 PROCEDURE — 99235 HOSP IP/OBS SAME DATE MOD 70: CPT | Performed by: HOSPITALIST

## 2024-09-15 PROCEDURE — 71045 X-RAY EXAM CHEST 1 VIEW: CPT | Performed by: EMERGENCY MEDICINE

## 2024-09-15 RX ORDER — ONDANSETRON 2 MG/ML
4 INJECTION INTRAMUSCULAR; INTRAVENOUS EVERY 6 HOURS PRN
Status: DISCONTINUED | OUTPATIENT
Start: 2024-09-15 | End: 2024-09-15

## 2024-09-15 RX ORDER — ECHINACEA PURPUREA EXTRACT 125 MG
1 TABLET ORAL
Status: DISCONTINUED | OUTPATIENT
Start: 2024-09-15 | End: 2024-09-15

## 2024-09-15 RX ORDER — POLYETHYLENE GLYCOL 3350 17 G/17G
17 POWDER, FOR SOLUTION ORAL DAILY PRN
Status: DISCONTINUED | OUTPATIENT
Start: 2024-09-15 | End: 2024-09-15

## 2024-09-15 RX ORDER — PROCHLORPERAZINE EDISYLATE 5 MG/ML
5 INJECTION INTRAMUSCULAR; INTRAVENOUS EVERY 8 HOURS PRN
Status: DISCONTINUED | OUTPATIENT
Start: 2024-09-15 | End: 2024-09-15

## 2024-09-15 RX ORDER — MELATONIN
3 NIGHTLY PRN
Status: DISCONTINUED | OUTPATIENT
Start: 2024-09-15 | End: 2024-09-15

## 2024-09-15 RX ORDER — SODIUM PHOSPHATE, DIBASIC AND SODIUM PHOSPHATE, MONOBASIC 7; 19 G/230ML; G/230ML
1 ENEMA RECTAL ONCE AS NEEDED
Status: DISCONTINUED | OUTPATIENT
Start: 2024-09-15 | End: 2024-09-15

## 2024-09-15 RX ORDER — BISACODYL 10 MG
10 SUPPOSITORY, RECTAL RECTAL
Status: DISCONTINUED | OUTPATIENT
Start: 2024-09-15 | End: 2024-09-15

## 2024-09-15 RX ORDER — ACETAMINOPHEN 500 MG
500 TABLET ORAL EVERY 4 HOURS PRN
Status: DISCONTINUED | OUTPATIENT
Start: 2024-09-15 | End: 2024-09-15

## 2024-09-15 RX ORDER — HEPARIN SODIUM 5000 [USP'U]/ML
5000 INJECTION, SOLUTION INTRAVENOUS; SUBCUTANEOUS EVERY 12 HOURS SCHEDULED
Status: DISCONTINUED | OUTPATIENT
Start: 2024-09-15 | End: 2024-09-15

## 2024-09-15 RX ORDER — SENNOSIDES 8.6 MG
17.2 TABLET ORAL NIGHTLY PRN
Status: DISCONTINUED | OUTPATIENT
Start: 2024-09-15 | End: 2024-09-15

## 2024-09-15 RX ORDER — PANTOPRAZOLE SODIUM 40 MG/1
40 TABLET, DELAYED RELEASE ORAL
Status: DISCONTINUED | OUTPATIENT
Start: 2024-09-15 | End: 2024-09-15

## 2024-09-15 NOTE — PLAN OF CARE
Stress test ordred for out-patient. IV and tele removed. DC papers discussed with and handed to patient. Questions answered.     Problem: Patient Centered Care  Goal: Patient preferences are identified and integrated in the patient's plan of care  Description: Interventions:  - What would you like us to know as we care for you?   - Provide timely, complete, and accurate information to patient/family  - Incorporate patient and family knowledge, values, beliefs, and cultural backgrounds into the planning and delivery of care  - Encourage patient/family to participate in care and decision-making at the level they choose  - Honor patient and family perspectives and choices  Outcome: Adequate for Discharge

## 2024-09-15 NOTE — ED QUICK NOTES
Pt presents to the ED for eval of CP. Pt reports L anterior CP that radiates to the LUE x 4 days. + Dyspnea.  Denies any cough or N/V. Here for further eval.

## 2024-09-15 NOTE — ED QUICK NOTES
Orders for admission, patient is aware of plan and ready to go upstairs. Any questions, please call ED JULY Rivera  at extension [phone].   Type of COVID test sent:None  COVID Suspicion level: Low    Titratable drug(s) infusing:  Rate:    LOC at time of transport:A&O x 4    Other pertinent information: Up independently. Skin is intact.     CIWA score=  NIH score=

## 2024-09-15 NOTE — DISCHARGE SUMMARY
[facility]     Rupinder Turjillo Patient Status:  Inpatient    1970 MRN [id number]   Location [facility] 3W/SW Attending Dang Villeda MD   Hosp Day # 0 PCP Tyrone Benz     Date of Admission: 9/15/2024  Date of Discharge:   Discharge Disposition: Home or Self Care    Admitting Chief Complaint:   Chest pain with moderate risk for cardiac etiology [R07.9]    PCP: Tyrone Benz    Discharge Diagnosis:   ***    History of Present Illness: ***    Brief Synopsis: ***      Things to follow up on:  ***      Discharge Medication List:     Discharge Medications        ASK your doctor about these medications        Instructions Prescription details   cetirizine 10 MG Tabs  Commonly known as: ZyrTEC  Ask about: Should I take this medication?      Take 1 tablet (10 mg total) by mouth daily.   Stop taking on: 2024  Quantity: 30 tablet  Refills: 0     cyanocobalamin 1000 MCG Tabs  Commonly known as: Vitamin B12      Take 1 tablet (1,000 mcg total) by mouth daily.   Refills: 0     fluconazole 150 MG Tabs  Commonly known as: Diflucan      Take 1 dose today then take another dose in 3 days.   Quantity: 2 tablet  Refills: 0     fluticasone propionate 50 MCG/ACT Susp  Commonly known as: Flonase      2 sprays by Each Nare route daily.   Quantity: 1 each  Refills: 0     MULTIVITAMIN ADULT OR      Take by mouth.   Refills: 0     Vitamin D 50 MCG (2000 UT) Caps      Take by mouth.   Refills: 0              Follow-up appointment:   No follow-up provider specified.    Vital signs:  Temp:  [98.1 °F (36.7 °C)-98.6 °F (37 °C)] 98.6 °F (37 °C)  Pulse:  [74-89] 77  Resp:  [12-20] 18  BP: (134-152)/(89-95) 152/92  SpO2:  [93 %-97 %] 97 %    Physical Exam:    General: No acute distress.   Respiratory: Clear to auscultation bilaterally. No wheezes. No rhonchi.  Cardiovascular: S1, S2. Regular rate and rhythm. No murmurs, rubs or gallops.   Abdomen: Soft, nontender, nondistended.   Positive bowel sounds. No rebound or guarding.  Neurologic: No focal neurological deficits.   Musculoskeletal: Moves all extremities.  Extremities: No edema.  -----------------------------------------------------------------------------------------------  PATIENT DISCHARGE INSTRUCTIONS: See electronic chart    I d/w pt and family the available results, management plan, medications changes, and discharge instructions including follow ups as outlined above.   Scripts sent to pharmacy.      Hospital Discharge Diagnoses: {Enter hospital discharge diagnoses here (please select the wildcards and then replace with free text; this allows us to save this data discretely for reporting purposes:5961::\"***\"}       59-90 High Risk  29-58 Medium Risk  0-28   Low Risk.    TCM Follow-Up Recommendation:  {Care Managers will evaluate the need for follow-up for all patients ages 50+, and high/moderate risk patients ages 25-49. Low risk patients (LACE < 29) will only be evaluated if the \"Still recommend for TCM follow-up\" option is selected from this list.:7396}        CAMILLA PRINGLE MD 9/15/2024    Time spent:  > 30 minutes     pharmacy.      Hospital Discharge Diagnoses:  chest pain       59-90 High Risk  29-58 Medium Risk  0-28   Low Risk.    TCM Follow-Up Recommendation:  LACE 29-58: Moderate Risk of readmission after discharge from the hospital.        CAMILLA PRINGLE MD 9/15/2024    Time spent:  > 30 minutes

## 2024-09-15 NOTE — H&P
[facility]  part of [facility]    History & Physical    Rupinder Trujillo Patient Status:  Inpatient    1970 MRN [id number]   Location [facility] 3W/SW Attending Dang Villeda MD   Hosp Day # 0 SADIQ Benz     Date:  9/15/2024  Date of Admission:  9/15/2024    History provided by:patient  Chief Complaint:     Chief Complaint   Patient presents with    Chest Pain Angina    Difficulty Breathing       HPI:   Rupinder Trujillo is a(n) 54 year old female  with h/o high cholesterol, smoking,  presented to ER for evaluation of L anterior CP that radiates to the LUE x 4 days, associated with dyspnea.   Denies any cough or N/V.   Symptom started a couple of days ago while she was at rest.  She describes it as a crushing pain, is constant.  She also has dyspnea with exertion.  No leg swelling.  No numbness or weakness of the arms or legs.  No back pain.  No nausea or vomiting.  No fever or chills.   W/u in ER essentially normal.     History     Past Medical History:    Arrhythmia    TACHYCARDIA     Deviated nasal bone    Disorder of liver    FATTY LIVER    Endometriosis    High cholesterol    Hodgkin lymphoma (HCC)     tx with ABVD regimen (6 months) at [facility] in [place], [place]    Migraines    Personal history of antineoplastic chemotherapy    Tachycardia     Past Surgical History:   Procedure Laterality Date    Chemotherapy      hodgkins lymphoma    Laparoscopic cholecystectomy  2022    no associated bleeding complications    Dunia localization wire 1 site right (cpt=19281)      ? done at age 17    Needle biopsy right      Removal of ovary(s)      2011 Left     Family History   Problem Relation Age of Onset    Heart Disorder Mother 82    Prostate Cancer Father 65    Cancer Self 25        lymphoma    Glaucoma Neg     Macular degeneration Neg     Diabetes Neg     Bleeding Disorders Neg     DVT/VTE Neg      Social History:  Social History      Socioeconomic History    Marital status:    Tobacco Use    Smoking status: Every Day     Current packs/day: 0.50     Average packs/day: 0.5 packs/day for 25.0 years (12.5 ttl pk-yrs)     Types: Cigarettes     Passive exposure: Current    Smokeless tobacco: Never   Vaping Use    Vaping status: Former   Substance and Sexual Activity    Alcohol use: Yes     Alcohol/week: 2.0 standard drinks of alcohol     Types: 2 Standard drinks or equivalent per week     Comment: social    Drug use: No    Sexual activity: Yes     Partners: Male   Other Topics Concern    Caffeine Concern No    Reaction to local anesthetic No   Social History Narrative    Rupinder is . She has no children. Patient works as an . She lives alone in [place], [place].     Social Determinants of Health     Food Insecurity: No Food Insecurity (9/15/2024)    Food Insecurity     Food Insecurity: Never true   Transportation Needs: No Transportation Needs (9/15/2024)    Transportation Needs     Lack of Transportation: No   Housing Stability: Low Risk  (9/15/2024)    Housing Stability     Housing Instability: No     Allergies/Medications:   Allergies:   Allergies   Allergen Reactions    Entex JITTERY    Morphine OTHER (SEE COMMENTS)     Hyper and aggressive.     Medications Prior to Admission   Medication Sig    Multiple Vitamin (MULTIVITAMIN ADULT OR) Take by mouth.    Cholecalciferol (VITAMIN D) 50 MCG (2000 UT) Oral Cap Take by mouth.    fluconazole (DIFLUCAN) 150 MG Oral Tab Take 1 dose today then take another dose in 3 days.    fluticasone propionate 50 MCG/ACT Nasal Suspension 2 sprays by Each Nare route daily.    [] cetirizine 10 MG Oral Tab Take 1 tablet (10 mg total) by mouth daily.    cyanocobalamin 1000 MCG Oral Tab Take 1 tablet (1,000 mcg total) by mouth daily.       Review of Systems:   Pertinent items are noted in HPI.  CONSTITUTIONAL:  No weight loss, fever, chills, weakness or fatigue.  HEENT:  Eyes:  No visual loss,  blurred vision, double vision or yellow sclerae. Ears, Nose, Throat:  No hearing loss, sneezing, congestion, runny nose or sore throat.  SKIN:  No rash or itching.  CARDIOVASCULAR:  No chest pain, chest pressure or chest discomfort. No palpitations or edema.  RESPIRATORY:  No shortness of breath, cough or sputum.  GASTROINTESTINAL:  No anorexia, nausea, vomiting or diarrhea. No abdominal pain or blood.  GENITOURINARY:  no dysuria, no hematuria  NEUROLOGICAL:  No headache, dizziness, syncope, paralysis, ataxia, numbness or tingling in the extremities. No change in bowel or bladder control.  MUSCULOSKELETAL:  No muscle, back pain, joint pain or stiffness.  HEMATOLOGIC:  No anemia, bleeding or bruising.  LYMPHATICS:  No enlarged nodes. No history of splenectomy.  PSYCHIATRIC:  No history of depression or anxiety.  ENDOCRINOLOGIC:  No reports of sweating, cold or heat intolerance. No polyuria or polydipsia.  ALLERGIES:  No history of asthma, hives, eczema or rhinitis.    Physical Exam:   Vital Signs:  Blood pressure (!) 152/92, pulse 77, temperature 98.6 °F (37 °C), temperature source Oral, resp. rate 18, height 5' 7\" (1.702 m), weight 185 lb (83.9 kg), last menstrual period 01/22/2017, SpO2 97%, not currently breastfeeding.     General:  Alert and oriented.  Diffuse skin problem:  None.  Eye:  Pupils are equal, round and reactive to light, extraocular movements are intact, Normal conjunctiva.  HENT:  Normocephalic, oral mucosa is moist.  Head:  Normocephalic, atraumatic.  Neck:  Supple, non-tender  Respiratory:  Lungs are clear to auscultation, respirations are non-labored, breath sounds are equal, symmetrical chest wall expansion.  Cardiovascular:  Normal rate, regular rhythm, no murmur, no edema.  Gastrointestinal:  Soft, non-tender, non-distended, normal bowel sounds, no organomegaly.  Lymphatics:  No lymphadenopathy neck, axilla, groin.  Musculoskeletal: Normal range of motion.  normal strength.  Feet:  Normal  pulses.  Neurologic:  Alert, oriented, no focal deficits, cranial nerves II-XII are grossly intact.  Cognition and Speech:  Oriented, speech clear and coherent.  Psychiatric:  Cooperative, appropriate mood & affect.    Cervical Papanicolaou {Cervical Pap:5397}    Results:     Lab Results   Component Value Date    WBC 9.3 09/15/2024    HGB 15.6 09/15/2024    HCT 45.3 09/15/2024    .0 09/15/2024    CREATSERUM 0.97 09/15/2024    BUN 8 (L) 09/15/2024     09/15/2024    K 4.2 09/15/2024     09/15/2024    CO2 22.0 09/15/2024    GLU 95 09/15/2024    CA 9.3 09/15/2024    ALB 4.4 01/31/2024    ALKPHO 69 01/31/2024    BILT 0.6 01/31/2024    TP 7.6 01/31/2024    AST 39 (H) 01/31/2024    ALT 37 01/31/2024    PTT 22.7 (L) 09/15/2024    INR 0.94 09/15/2024    T4F 1.0 01/31/2024    TSH 1.685 01/31/2024     06/26/2022    DDIMER 0.35 09/15/2024    B12 719 01/31/2024       XR CHEST AP PORTABLE  (CPT=71045)    Result Date: 9/15/2024  CONCLUSION:   Mild bibasilar atelectasis/scarring.  Otherwise, negative for radiographically evident acute intrathoracic process.   Dictated by (CST): Lucio Thomas MD on 9/15/2024 at 2:15 PM     Finalized by (CST): Lucio Thomas MD on 9/15/2024 at 2:16 PM         EKG 12 Lead    Result Date: 9/15/2024  Normal sinus rhythm Possible Left atrial enlargement Borderline ECG When compared with ECG of 06-FEB-2024 10:57, No significant change was found     Assessment/Plan:     Chest pain with moderate risk for cardiac etiology  ***          Chart reviewed, including current admission vitals, notes, labs and imaging  Pertinent past medical records reviewed  Labs ordered and medications adjusted as outlined above  Coordinate care with care team/consultants  Discussed with patient and family at bedside available results of tests, management plan as outlined above, and the need for hospitalization  D/w RN     MDM moderate  severe acute illness/or exacerbation of chronic illness posing a  threat to life, IV medication, requiring close monitoring in hospital.    CAMILLA PRINGLE MD  9/15/2024

## 2024-09-15 NOTE — ED INITIAL ASSESSMENT (HPI)
Patient is here with shortness of breath, chest pain and pain in her left arm x 4 days. Denies cardiac history.   
Viral illness

## 2024-09-15 NOTE — ED PROVIDER NOTES
Patient Seen in: [facility] Emergency Department      History     Chief Complaint   Patient presents with    Chest Pain Angina    Difficulty Breathing     Stated Complaint: cp/l arm pain x 4 days    Subjective:   HPI  54 yoF with high cholesterol, smoker, who presents for evaluation of chest pain and left arm pain.  Symptom started a couple of days ago while she was at rest.  She describes it as a crushing pain, is constant.  She also has dyspnea with exertion.  No leg swelling.  No numbness or weakness of the arms or legs.  No back pain.  No nausea or vomiting.  No fever or chills.      Objective:   Past Medical History:    Arrhythmia    TACHYCARDIA     Deviated nasal bone    Disorder of liver    FATTY LIVER    Endometriosis    High cholesterol    Hodgkin lymphoma (HCC)    1996 tx with ABVD regimen (6 months) at [facility] in [place], [place]    Migraines    Personal history of antineoplastic chemotherapy    Tachycardia              Past Surgical History:   Procedure Laterality Date    Chemotherapy  1996    hodgkins lymphoma    Laparoscopic cholecystectomy  06/2022    no associated bleeding complications    Dunia localization wire 1 site right (cpt=19281)      1985/86? done at age 17    Needle biopsy right      Removal of ovary(s)      2011 Left                Social History     Socioeconomic History    Marital status:    Tobacco Use    Smoking status: Every Day     Current packs/day: 0.50     Average packs/day: 0.5 packs/day for 25.0 years (12.5 ttl pk-yrs)     Types: Cigarettes     Passive exposure: Current    Smokeless tobacco: Never   Vaping Use    Vaping status: Former   Substance and Sexual Activity    Alcohol use: Yes     Alcohol/week: 2.0 standard drinks of alcohol     Types: 2 Standard drinks or equivalent per week     Comment: social    Drug use: No    Sexual activity: Yes     Partners: Male   Other Topics Concern    Caffeine Concern No    Reaction to local anesthetic No   Social History  Scott Bucio is . She has no children. Patient works as an . She lives alone in [place], [place].              Review of Systems    Positive for stated Chief Complaint: Chest Pain Angina and Difficulty Breathing    Other systems are as noted in HPI.  Constitutional and vital signs reviewed.      All other systems reviewed and negative except as noted above.    Physical Exam     ED Triage Vitals [09/15/24 1234]   /89   Pulse 89   Resp 20   Temp 98.1 °F (36.7 °C)   Temp src Oral   SpO2 96 %   O2 Device None (Room air)       Current Vitals:   Vital Signs  BP: (!) 136/95  Pulse: 84  Resp: 15  Temp: 98.1 °F (36.7 °C)  Temp src: Oral  MAP (mmHg): (!) 109    Oxygen Therapy  SpO2: 93 %  O2 Device: None (Room air)            Physical Exam  Vitals and nursing note reviewed.   Constitutional:       Appearance: She is well-developed.   HENT:      Head: Normocephalic and atraumatic.   Eyes:      Extraocular Movements: Extraocular movements intact.   Cardiovascular:      Rate and Rhythm: Normal rate and regular rhythm.      Pulses:           Radial pulses are 2+ on the right side and 2+ on the left side.      Heart sounds: Normal heart sounds.   Pulmonary:      Effort: Pulmonary effort is normal.      Breath sounds: Normal breath sounds.   Abdominal:      General: There is no distension.      Palpations: Abdomen is soft.      Tenderness: There is no abdominal tenderness.   Musculoskeletal:         General: Normal range of motion.      Cervical back: Normal range of motion.      Right lower leg: No edema.      Left lower leg: No edema.   Skin:     General: Skin is warm.   Neurological:      Mental Status: She is alert.      Comments: No focal deficits       Differential diagnosis includes but is not limited to ACS/MI, PE, dissection, pleurisy, musculoskeletal pain, CVA        ED Course     Labs Reviewed   PTT, ACTIVATED - Abnormal; Notable for the following components:       Result Value    PTT 22.7 (*)      All other components within normal limits   REDRAW BMP - Abnormal; Notable for the following components:    BUN 8 (*)     All other components within normal limits   TROPONIN I HIGH SENSITIVITY - Normal   PROTHROMBIN TIME (PT) - Normal   D-DIMER - Normal   PRO BETA NATRIURETIC PEPTIDE - Normal   BASIC METABOLIC PANEL (8)   CBC WITH DIFFERENTIAL WITH PLATELET   RAINBOW DRAW LAVENDER   RAINBOW DRAW LIGHT GREEN   RAINBOW DRAW BLUE   RAINBOW DRAW GOLD     EKG    Rate, intervals and axes as noted on EKG Report.  Rate: 83  Rhythm: Sinus Rhythm  Reading: No STEMI, possible left atrial enlargement, no significant change compared to prior EKG from 2/6/2024                 XR CHEST AP PORTABLE  (CPT=71045)    Result Date: 9/15/2024  CONCLUSION:   Mild bibasilar atelectasis/scarring.  Otherwise, negative for radiographically evident acute intrathoracic process.   Dictated by (CST): Lucio Thomas MD on 9/15/2024 at 2:15 PM     Finalized by (CST): Lucio Thomas MD on 9/15/2024 at 2:16 PM                  MDM                              Medical Decision Making     Vitals stable in the ED.  EKG per my independent interpretation negative for acute arrhythmia or ischemia.  I ordered interpreted labs including BMP, CBC, coags, D-dimer, BNP and high-sensitivity troponin which are unremarkable for emergent pathologies.  Per my depend interpretation of chest x-ray, no clear mediastinal widening or pulmonary edema.  Given age, risk factors, without recent provocative cardiac testing, will plan for admission and further cardiac rule out.  Discussed with [facility] hospitalist Dr Villeda for admission.          Problems Addressed:  Chest pain with moderate risk for cardiac etiology: complicated acute illness or injury with systemic symptoms that poses a threat to life or bodily functions    Amount and/or Complexity of Data Reviewed  External Data Reviewed: radiology.     Details: Reviewed echo report from 3/11/2024 which showed EF  55 to 60%  Labs: ordered. Decision-making details documented in ED Course.  Radiology: ordered and independent interpretation performed. Decision-making details documented in ED Course.  ECG/medicine tests: ordered and independent interpretation performed. Decision-making details documented in ED Course.  Discussion of management or test interpretation with external provider(s): As above    Risk  Decision regarding hospitalization.        Disposition and Plan     Clinical Impression:  1. Chest pain with moderate risk for cardiac etiology         Disposition:  Admit  9/15/2024  2:12 pm    Follow-up:  No follow-up provider specified.        Medications Prescribed:  Current Discharge Medication List                            Hospital Problems       Present on Admission  Date Reviewed: 6/6/2024            ICD-10-CM Noted POA    * (Principal) Chest pain with moderate risk for cardiac etiology R07.9 9/15/2024 Unknown

## 2024-09-16 LAB
ATRIAL RATE: 83 BPM
P AXIS: 69 DEGREES
P-R INTERVAL: 132 MS
Q-T INTERVAL: 364 MS
QRS DURATION: 80 MS
QTC CALCULATION (BEZET): 427 MS
R AXIS: 60 DEGREES
T AXIS: 46 DEGREES
VENTRICULAR RATE: 83 BPM

## 2024-09-18 NOTE — PAYOR COMM NOTE
DISREGARD INPATIENT AUTH, PATIENT ADMITTED OBSERVATION     --------------  ADMISSION REVIEW     Payor: [employer]   Subscriber #:  [id number]  Authorization Number: [id number]    Admit date: 9/15/24       REVIEW DOCUMENTATION:     ED Provider Notes        ED Provider Notes signed by Jennifer Parkinson MD at 9/15/2024  3:04 PM       Author: Jennifer Parkinson MD Service: -- Author Type: Physician    Filed: 9/15/2024  3:04 PM Date of Service: 9/15/2024  1:38 PM Status: Signed    : Jennifer Parkinson MD (Physician)           Patient Seen in: [facility] Emergency Department      History     Chief Complaint   Patient presents with    Chest Pain Angina    Difficulty Breathing     Stated Complaint: cp/l arm pain x 4 days    Subjective:   HPI  54 yoF with high cholesterol, smoker, who presents for evaluation of chest pain and left arm pain.  Symptom started a couple of days ago while she was at rest.  She describes it as a crushing pain, is constant.  She also has dyspnea with exertion.  No leg swelling.  No numbness or weakness of the arms or legs.  No back pain.  No nausea or vomiting.  No fever or chills.      Objective:   Past Medical History:    Arrhythmia    TACHYCARDIA     Deviated nasal bone    Disorder of liver    FATTY LIVER    Endometriosis    High cholesterol    Hodgkin lymphoma (HCC)    1996 tx with ABVD regimen (6 months) at [facility] in [place], [place]    Migraines    Personal history of antineoplastic chemotherapy    Tachycardia              Past Surgical History:   Procedure Laterality Date    Chemotherapy  1996    hodgkins lymphoma    Laparoscopic cholecystectomy  06/2022    no associated bleeding complications    Dunia localization wire 1 site right (cpt=19281)      1985/86? done at age 17    Needle biopsy right      Removal of ovary(s)      2011 Left                Social History     Socioeconomic History    Marital status:    Tobacco Use    Smoking status: Every Day     Current  packs/day: 0.50     Average packs/day: 0.5 packs/day for 25.0 years (12.5 ttl pk-yrs)     Types: Cigarettes     Passive exposure: Current    Smokeless tobacco: Never   Vaping Use    Vaping status: Former   Substance and Sexual Activity    Alcohol use: Yes     Alcohol/week: 2.0 standard drinks of alcohol     Types: 2 Standard drinks or equivalent per week     Comment: social    Drug use: No    Sexual activity: Yes     Partners: Male   Other Topics Concern    Caffeine Concern No    Reaction to local anesthetic No   Social History Narrative    Rupinder is . She has no children. Patient works as an . She lives alone in [place], [place].              Review of Systems    Positive for stated Chief Complaint: Chest Pain Angina and Difficulty Breathing    Other systems are as noted in HPI.  Constitutional and vital signs reviewed.      All other systems reviewed and negative except as noted above.    Physical Exam     ED Triage Vitals [09/15/24 1234]   /89   Pulse 89   Resp 20   Temp 98.1 °F (36.7 °C)   Temp src Oral   SpO2 96 %   O2 Device None (Room air)       Current Vitals:   Vital Signs  BP: (!) 136/95  Pulse: 84  Resp: 15  Temp: 98.1 °F (36.7 °C)  Temp src: Oral  MAP (mmHg): (!) 109    Oxygen Therapy  SpO2: 93 %  O2 Device: None (Room air)            Physical Exam  Vitals and nursing note reviewed.   Constitutional:       Appearance: She is well-developed.   HENT:      Head: Normocephalic and atraumatic.   Eyes:      Extraocular Movements: Extraocular movements intact.   Cardiovascular:      Rate and Rhythm: Normal rate and regular rhythm.      Pulses:           Radial pulses are 2+ on the right side and 2+ on the left side.      Heart sounds: Normal heart sounds.   Pulmonary:      Effort: Pulmonary effort is normal.      Breath sounds: Normal breath sounds.   Abdominal:      General: There is no distension.      Palpations: Abdomen is soft.      Tenderness: There is no abdominal tenderness.    Musculoskeletal:         General: Normal range of motion.      Cervical back: Normal range of motion.      Right lower leg: No edema.      Left lower leg: No edema.   Skin:     General: Skin is warm.   Neurological:      Mental Status: She is alert.      Comments: No focal deficits       Differential diagnosis includes but is not limited to ACS/MI, PE, dissection, pleurisy, musculoskeletal pain, CVA        ED Course     Labs Reviewed   PTT, ACTIVATED - Abnormal; Notable for the following components:       Result Value    PTT 22.7 (*)     All other components within normal limits   REDRAW BMP - Abnormal; Notable for the following components:    BUN 8 (*)     All other components within normal limits   TROPONIN I HIGH SENSITIVITY - Normal   PROTHROMBIN TIME (PT) - Normal   D-DIMER - Normal   PRO BETA NATRIURETIC PEPTIDE - Normal   BASIC METABOLIC PANEL (8)   CBC WITH DIFFERENTIAL WITH PLATELET   RAINBOW DRAW LAVENDER   RAINBOW DRAW LIGHT GREEN   RAINBOW DRAW BLUE   RAINBOW DRAW GOLD     EKG    Rate, intervals and axes as noted on EKG Report.  Rate: 83  Rhythm: Sinus Rhythm  Reading: No STEMI, possible left atrial enlargement, no significant change compared to prior EKG from 2/6/2024                 XR CHEST AP PORTABLE  (CPT=71045)    Result Date: 9/15/2024  CONCLUSION:   Mild bibasilar atelectasis/scarring.  Otherwise, negative for radiographically evident acute intrathoracic process.   Dictated by (CST): Lucio Thomas MD on 9/15/2024 at 2:15 PM     Finalized by (CST): Lucio Thomas MD on 9/15/2024 at 2:16 PM                 [facility]                              Medical Decision Making     Vitals stable in the ED.  EKG per my independent interpretation negative for acute arrhythmia or ischemia.  I ordered interpreted labs including BMP, CBC, coags, D-dimer, BNP and high-sensitivity troponin which are unremarkable for emergent pathologies.  Per my depend interpretation of chest x-ray, no clear mediastinal widening or  pulmonary edema.  Given age, risk factors, without recent provocative cardiac testing, will plan for admission and further cardiac rule out.  Discussed with [facility] hospitalist Dr Villeda for admission.          Problems Addressed:  Chest pain with moderate risk for cardiac etiology: complicated acute illness or injury with systemic symptoms that poses a threat to life or bodily functions    Amount and/or Complexity of Data Reviewed  External Data Reviewed: radiology.     Details: Reviewed echo report from 3/11/2024 which showed EF 55 to 60%  Labs: ordered. Decision-making details documented in ED Course.  Radiology: ordered and independent interpretation performed. Decision-making details documented in ED Course.  ECG/medicine tests: ordered and independent interpretation performed. Decision-making details documented in ED Course.  Discussion of management or test interpretation with external provider(s): As above    Risk  Decision regarding hospitalization.        Disposition and Plan     Clinical Impression:  1. Chest pain with moderate risk for cardiac etiology         Disposition:  Admit  9/15/2024  2:12 pm    Follow-up:  No follow-up provider specified.        Medications Prescribed:  Current Discharge Medication List                            Hospital Problems       Present on Admission  Date Reviewed: 6/6/2024            ICD-10-CM Noted POA    * (Principal) Chest pain with moderate risk for cardiac etiology R07.9 9/15/2024 Unknown           Vitals (last day) before discharge       Date/Time Temp Pulse Resp BP SpO2 Weight O2 Device O2 Flow Rate (L/min) [facility]    09/15/24 1527 -- 77 -- -- -- -- -- --     09/15/24 1521 -- -- -- -- -- 185 lb (83.9 kg) -- --     09/15/24 1503 98.6 °F (37 °C) -- 18 152/92 97 % -- None (Room air) --     09/15/24 1430 -- 84 15 136/95 93 % -- None (Room air) --     09/15/24 1330 -- 74 12 134/92 94 % -- None (Room air) --     09/15/24 1234 98.1 °F (36.7 °C) 89 20 141/89 96 %  -- None (Room air) -- JN    09/15/24 1233 -- -- -- -- -- 192 lb (87.1 kg) -- -- JN          --------------  DISCHARGE REVIEW    Payor: [employer]   Subscriber #:  [id number]  Authorization Number: [id number]     Admit date: 9/15/24  Admit time:   3:01 PM  Discharge Date: 9/15/2024  5:44 PM             Admitting Physician: Dang Villeda MD  Attending Physician:  No att. providers found  Primary Care Physician: Tyrone Benz

## 2024-09-18 NOTE — PAYOR COMM NOTE
--------------  DISCHARGE REVIEW    Payor: [employer]   Subscriber #:  [id number]  Authorization Number: [id number]    Admit date: 9/15/24  Admit time:   3:01 PM  Discharge Date: 9/15/2024  5:44 PM     Admitting Physician: Dang Villeda MD  Attending Physician:  No att. providers found  Primary Care Physician: Tyrone Benz         DISREGGAEL INPATIENT AUTH, PATIENT ADMITTED OBSERVATION             Clinical Impression:  1. Chest pain with moderate risk for cardiac etiology

## 2025-01-09 ENCOUNTER — HOSPITAL ENCOUNTER (EMERGENCY)
Age: 55
Discharge: HOME | End: 2025-01-09
Attending: EMERGENCY MEDICINE

## 2025-01-09 ENCOUNTER — APPOINTMENT (OUTPATIENT)
Dept: GENERAL RADIOLOGY | Age: 55
End: 2025-01-09
Attending: EMERGENCY MEDICINE

## 2025-01-09 VITALS
TEMPERATURE: 97.9 F | HEART RATE: 92 BPM | DIASTOLIC BLOOD PRESSURE: 80 MMHG | RESPIRATION RATE: 16 BRPM | SYSTOLIC BLOOD PRESSURE: 130 MMHG | OXYGEN SATURATION: 96 %

## 2025-01-09 DIAGNOSIS — R07.9 CHEST PAIN, UNSPECIFIED TYPE: Primary | ICD-10-CM

## 2025-01-09 LAB
ALBUMIN SERPL-MCNC: 3.8 G/DL (ref 3.4–5)
ALBUMIN/GLOB SERPL: 1 {RATIO} (ref 1–2.4)
ALP SERPL-CCNC: 76 UNITS/L (ref 45–117)
ALT SERPL-CCNC: 46 UNITS/L
ANION GAP SERPL CALC-SCNC: 10 MMOL/L (ref 7–19)
AST SERPL-CCNC: 44 UNITS/L
ATRIAL RATE (BPM): 87
BASOPHILS # BLD: 0.1 K/MCL (ref 0–0.3)
BASOPHILS NFR BLD: 1 %
BILIRUB SERPL-MCNC: 0.5 MG/DL (ref 0.2–1)
BUN SERPL-MCNC: 10 MG/DL (ref 6–20)
BUN/CREAT SERPL: 11 (ref 7–25)
CALCIUM SERPL-MCNC: 9.4 MG/DL (ref 8.4–10.2)
CHLORIDE SERPL-SCNC: 109 MMOL/L (ref 97–110)
CO2 SERPL-SCNC: 24 MMOL/L (ref 21–32)
CREAT SERPL-MCNC: 0.9 MG/DL (ref 0.51–0.95)
DEPRECATED RDW RBC: 40.7 FL (ref 39–50)
EGFRCR SERPLBLD CKD-EPI 2021: 76 ML/MIN/{1.73_M2}
EOSINOPHIL # BLD: 0.3 K/MCL (ref 0–0.5)
EOSINOPHIL NFR BLD: 2 %
ERYTHROCYTE [DISTWIDTH] IN BLOOD: 12.4 % (ref 11–15)
FASTING DURATION TIME PATIENT: ABNORMAL H
GLOBULIN SER-MCNC: 4 G/DL (ref 2–4)
GLUCOSE SERPL-MCNC: 107 MG/DL (ref 70–99)
HCT VFR BLD CALC: 47.1 % (ref 36–46.5)
HGB BLD-MCNC: 15.8 G/DL (ref 12–15.5)
IMM GRANULOCYTES # BLD AUTO: 0.1 K/MCL (ref 0–0.2)
IMM GRANULOCYTES # BLD: 0 %
LYMPHOCYTES # BLD: 3.8 K/MCL (ref 1–4)
LYMPHOCYTES NFR BLD: 33 %
MCH RBC QN AUTO: 30 PG (ref 26–34)
MCHC RBC AUTO-ENTMCNC: 33.5 G/DL (ref 32–36.5)
MCV RBC AUTO: 89.4 FL (ref 78–100)
MONOCYTES # BLD: 0.7 K/MCL (ref 0.3–0.9)
MONOCYTES NFR BLD: 7 %
NEUTROPHILS # BLD: 6.5 K/MCL (ref 1.8–7.7)
NEUTROPHILS NFR BLD: 57 %
NRBC BLD MANUAL-RTO: 0 /100 WBC
P AXIS (DEGREES): 71
PLATELET # BLD AUTO: 348 K/MCL (ref 140–450)
POTASSIUM SERPL-SCNC: 4.5 MMOL/L (ref 3.4–5.1)
PR-INTERVAL (MSEC): 136
PROT SERPL-MCNC: 7.8 G/DL (ref 6.4–8.2)
QRS-INTERVAL (MSEC): 80
QT-INTERVAL (MSEC): 370
QTC: 445
R AXIS (DEGREES): 73
RBC # BLD: 5.27 MIL/MCL (ref 4–5.2)
REPORT TEXT: NORMAL
SODIUM SERPL-SCNC: 138 MMOL/L (ref 135–145)
T AXIS (DEGREES): 60
TROPONIN I SERPL DL<=0.01 NG/ML-MCNC: 4 NG/L
TROPONIN I SERPL DL<=0.01 NG/ML-MCNC: <4 NG/L
VENTRICULAR RATE EKG/MIN (BPM): 87
WBC # BLD: 11.5 K/MCL (ref 4.2–11)

## 2025-01-09 PROCEDURE — 36415 COLL VENOUS BLD VENIPUNCTURE: CPT

## 2025-01-09 PROCEDURE — 10002801 HB RX 250 W/O HCPCS

## 2025-01-09 PROCEDURE — 93010 ELECTROCARDIOGRAM REPORT: CPT | Performed by: INTERNAL MEDICINE

## 2025-01-09 PROCEDURE — 85025 COMPLETE CBC W/AUTO DIFF WBC: CPT | Performed by: EMERGENCY MEDICINE

## 2025-01-09 PROCEDURE — 84484 ASSAY OF TROPONIN QUANT: CPT

## 2025-01-09 PROCEDURE — 99285 EMERGENCY DEPT VISIT HI MDM: CPT

## 2025-01-09 PROCEDURE — 10004651 HB RX, NO CHARGE ITEM

## 2025-01-09 PROCEDURE — 84484 ASSAY OF TROPONIN QUANT: CPT | Performed by: EMERGENCY MEDICINE

## 2025-01-09 PROCEDURE — 80053 COMPREHEN METABOLIC PANEL: CPT | Performed by: EMERGENCY MEDICINE

## 2025-01-09 PROCEDURE — 93005 ELECTROCARDIOGRAM TRACING: CPT | Performed by: EMERGENCY MEDICINE

## 2025-01-09 PROCEDURE — 10002803 HB RX 637

## 2025-01-09 PROCEDURE — 71045 X-RAY EXAM CHEST 1 VIEW: CPT

## 2025-01-09 PROCEDURE — 99284 EMERGENCY DEPT VISIT MOD MDM: CPT | Performed by: EMERGENCY MEDICINE

## 2025-01-09 RX ORDER — ACETAMINOPHEN 325 MG/1
650 TABLET ORAL ONCE
Status: COMPLETED | OUTPATIENT
Start: 2025-01-09 | End: 2025-01-09

## 2025-01-09 RX ORDER — FAMOTIDINE 20 MG/1
40 TABLET, FILM COATED ORAL ONCE
Status: COMPLETED | OUTPATIENT
Start: 2025-01-09 | End: 2025-01-09

## 2025-01-09 RX ADMIN — Medication 15 ML: at 06:57

## 2025-01-09 RX ADMIN — ACETAMINOPHEN 650 MG: 325 TABLET ORAL at 06:57

## 2025-01-09 RX ADMIN — FAMOTIDINE 40 MG: 20 TABLET, FILM COATED ORAL at 06:57

## 2025-01-09 ASSESSMENT — HEART SCORE
HEART SCORE: 2
TROPONIN: EQUAL OR LESS THAN NORMAL LIMIT
EKG: NORMAL
HISTORY: SLIGHTLY SUSPICIOUS
RISK FACTORS: 1-2 RISK FACTORS
AGE: GREATER THAN 45 TO LESS THAN 65

## 2025-01-09 ASSESSMENT — PAIN SCALES - GENERAL: PAINLEVEL_OUTOF10: 5

## 2025-01-10 ENCOUNTER — TELEPHONE (OUTPATIENT)
Dept: CARDIOLOGY | Age: 55
End: 2025-01-10

## 2025-01-10 DIAGNOSIS — R07.89 OTHER CHEST PAIN: Primary | ICD-10-CM

## 2025-01-14 ENCOUNTER — TELEPHONE (OUTPATIENT)
Dept: INTERNAL MEDICINE CLINIC | Facility: CLINIC | Age: 55
End: 2025-01-14

## 2025-02-13 ENCOUNTER — TELEPHONE (OUTPATIENT)
Dept: INTERNAL MEDICINE CLINIC | Facility: CLINIC | Age: 55
End: 2025-02-13

## 2025-02-13 DIAGNOSIS — S90.859A WOOD SPLINTER IN FOOT: Primary | ICD-10-CM

## 2025-02-13 DIAGNOSIS — L60.0 IGTN (INGROWING TOE NAIL): ICD-10-CM

## 2025-02-13 NOTE — TELEPHONE ENCOUNTER
Patient is requesting referral.     Name of specialist and specialty department : asking for referral to podiatrist, not sure who it was too, had referral but expires on 01/30/25, needs new referral placed.   Reason for visit with the specialist: stated has something stuck in foot  Address of the specialist office:none  Appointment date: none         CSS informed patient the turnaround time for referral is 5-7 business days.  Patient was informed to check their [employer] account for referral status.

## 2025-02-14 ENCOUNTER — HOSPITAL ENCOUNTER (EMERGENCY)
Facility: HOSPITAL | Age: 55
Discharge: HOME | End: 2025-02-14
Attending: EMERGENCY MEDICINE
Payer: COMMERCIAL

## 2025-02-14 VITALS
HEART RATE: 84 BPM | HEIGHT: 67 IN | OXYGEN SATURATION: 95 % | RESPIRATION RATE: 16 BRPM | BODY MASS INDEX: 29.82 KG/M2 | TEMPERATURE: 97 F | DIASTOLIC BLOOD PRESSURE: 74 MMHG | WEIGHT: 190 LBS | SYSTOLIC BLOOD PRESSURE: 126 MMHG

## 2025-02-14 DIAGNOSIS — J02.9 VIRAL PHARYNGITIS: Primary | ICD-10-CM

## 2025-02-14 DIAGNOSIS — J01.00 ACUTE NON-RECURRENT MAXILLARY SINUSITIS: ICD-10-CM

## 2025-02-14 PROCEDURE — 99284 EMERGENCY DEPT VISIT MOD MDM: CPT

## 2025-02-14 PROCEDURE — 99283 EMERGENCY DEPT VISIT LOW MDM: CPT

## 2025-02-14 PROCEDURE — 93010 ELECTROCARDIOGRAM REPORT: CPT

## 2025-02-14 PROCEDURE — 87430 STREP A AG IA: CPT | Performed by: EMERGENCY MEDICINE

## 2025-02-14 PROCEDURE — 87430 STREP A AG IA: CPT

## 2025-02-14 PROCEDURE — 93005 ELECTROCARDIOGRAM TRACING: CPT

## 2025-02-14 RX ORDER — FLUTICASONE PROPIONATE 50 MCG
2 SPRAY, SUSPENSION (ML) NASAL DAILY
Qty: 16 G | Refills: 0 | Status: SHIPPED | OUTPATIENT
Start: 2025-02-14 | End: 2025-02-18 | Stop reason: ALTCHOICE

## 2025-02-14 RX ORDER — DOXYCYCLINE 100 MG/1
100 CAPSULE ORAL 2 TIMES DAILY
Qty: 14 CAPSULE | Refills: 0 | Status: SHIPPED | OUTPATIENT
Start: 2025-02-14 | End: 2025-02-18 | Stop reason: ALTCHOICE

## 2025-02-14 NOTE — TELEPHONE ENCOUNTER
Dr. Benz,     Patient called requesting referral to a [facility] podiatrist.    Pended referral please review diagnosis and sign off if you agree.    Thank you.  Norma Escobedo  [facility]

## 2025-02-15 LAB
ATRIAL RATE: 80 BPM
P AXIS: 72 DEGREES
P-R INTERVAL: 132 MS
Q-T INTERVAL: 390 MS
QRS DURATION: 78 MS
QTC CALCULATION (BEZET): 449 MS
R AXIS: 65 DEGREES
T AXIS: 48 DEGREES
VENTRICULAR RATE: 80 BPM

## 2025-02-15 NOTE — ED INITIAL ASSESSMENT (HPI)
Pt presents to ed with c/o throat pressure. Pt states she is having pressure on the right side of her neck and dizziness x 1 week. Pt states the pressure and dizziness are intermittent but worsened yesterday.      Denies fever or n/v. + diarrhea.  Aox4, speaking in full sentences.

## 2025-02-18 ENCOUNTER — LAB ENCOUNTER (OUTPATIENT)
Dept: LAB | Age: 55
End: 2025-02-18
Attending: NURSE PRACTITIONER
Payer: COMMERCIAL

## 2025-02-18 ENCOUNTER — OFFICE VISIT (OUTPATIENT)
Dept: INTERNAL MEDICINE CLINIC | Facility: CLINIC | Age: 55
End: 2025-02-18
Payer: COMMERCIAL

## 2025-02-18 VITALS
SYSTOLIC BLOOD PRESSURE: 136 MMHG | HEIGHT: 67 IN | WEIGHT: 161 LBS | RESPIRATION RATE: 16 BRPM | BODY MASS INDEX: 25.27 KG/M2 | DIASTOLIC BLOOD PRESSURE: 84 MMHG | HEART RATE: 85 BPM

## 2025-02-18 DIAGNOSIS — Z12.31 VISIT FOR SCREENING MAMMOGRAM: ICD-10-CM

## 2025-02-18 DIAGNOSIS — Z00.00 ANNUAL PHYSICAL EXAM: ICD-10-CM

## 2025-02-18 DIAGNOSIS — R91.8 MULTIPLE LUNG NODULES: ICD-10-CM

## 2025-02-18 DIAGNOSIS — F32.A ANXIETY AND DEPRESSION: ICD-10-CM

## 2025-02-18 DIAGNOSIS — Z00.00 ANNUAL PHYSICAL EXAM: Primary | ICD-10-CM

## 2025-02-18 DIAGNOSIS — Z12.11 SCREENING FOR COLON CANCER: ICD-10-CM

## 2025-02-18 DIAGNOSIS — Z72.0 TOBACCO ABUSE: ICD-10-CM

## 2025-02-18 DIAGNOSIS — Z85.71 H/O HODGKIN'S LYMPHOMA: ICD-10-CM

## 2025-02-18 DIAGNOSIS — F41.9 ANXIETY AND DEPRESSION: ICD-10-CM

## 2025-02-18 DIAGNOSIS — J01.10 ACUTE NON-RECURRENT FRONTAL SINUSITIS: ICD-10-CM

## 2025-02-18 DIAGNOSIS — D72.829 LEUKOCYTOSIS, UNSPECIFIED TYPE: ICD-10-CM

## 2025-02-18 DIAGNOSIS — K21.9 GASTROESOPHAGEAL REFLUX DISEASE WITHOUT ESOPHAGITIS: ICD-10-CM

## 2025-02-18 DIAGNOSIS — Z12.4 SCREENING FOR CERVICAL CANCER: ICD-10-CM

## 2025-02-18 LAB
ALBUMIN SERPL-MCNC: 4.8 G/DL (ref 3.2–4.8)
ALBUMIN/GLOB SERPL: 1.7 {RATIO} (ref 1–2)
ALP LIVER SERPL-CCNC: 76 U/L
ALT SERPL-CCNC: 42 U/L
ANION GAP SERPL CALC-SCNC: 8 MMOL/L (ref 0–18)
AST SERPL-CCNC: 33 U/L (ref ?–34)
BASOPHILS # BLD AUTO: 0.08 X10(3) UL (ref 0–0.2)
BASOPHILS NFR BLD AUTO: 0.7 %
BILIRUB SERPL-MCNC: 0.5 MG/DL (ref 0.3–1.2)
BUN BLD-MCNC: 11 MG/DL (ref 9–23)
BUN/CREAT SERPL: 10.2 (ref 10–20)
CALCIUM BLD-MCNC: 9.7 MG/DL (ref 8.7–10.4)
CHLORIDE SERPL-SCNC: 104 MMOL/L (ref 98–112)
CHOLEST SERPL-MCNC: 206 MG/DL (ref ?–200)
CO2 SERPL-SCNC: 27 MMOL/L (ref 21–32)
CREAT BLD-MCNC: 1.08 MG/DL
DEPRECATED RDW RBC AUTO: 40.6 FL (ref 35.1–46.3)
EGFRCR SERPLBLD CKD-EPI 2021: 61 ML/MIN/1.73M2 (ref 60–?)
EOSINOPHIL # BLD AUTO: 0.09 X10(3) UL (ref 0–0.7)
EOSINOPHIL NFR BLD AUTO: 0.7 %
ERYTHROCYTE [DISTWIDTH] IN BLOOD BY AUTOMATED COUNT: 12.4 % (ref 11–15)
FASTING PATIENT LIPID ANSWER: YES
FASTING STATUS PATIENT QL REPORTED: YES
GLOBULIN PLAS-MCNC: 2.9 G/DL (ref 2–3.5)
GLUCOSE BLD-MCNC: 95 MG/DL (ref 70–99)
HCT VFR BLD AUTO: 48.8 %
HDLC SERPL-MCNC: 34 MG/DL (ref 40–59)
HGB BLD-MCNC: 16.3 G/DL
IMM GRANULOCYTES # BLD AUTO: 0.04 X10(3) UL (ref 0–1)
IMM GRANULOCYTES NFR BLD: 0.3 %
LDLC SERPL CALC-MCNC: 111 MG/DL (ref ?–100)
LYMPHOCYTES # BLD AUTO: 3.54 X10(3) UL (ref 1–4)
LYMPHOCYTES NFR BLD AUTO: 29 %
MCH RBC QN AUTO: 30 PG (ref 26–34)
MCHC RBC AUTO-ENTMCNC: 33.4 G/DL (ref 31–37)
MCV RBC AUTO: 89.9 FL
MONOCYTES # BLD AUTO: 0.54 X10(3) UL (ref 0.1–1)
MONOCYTES NFR BLD AUTO: 4.4 %
NEUTROPHILS # BLD AUTO: 7.92 X10 (3) UL (ref 1.5–7.7)
NEUTROPHILS # BLD AUTO: 7.92 X10(3) UL (ref 1.5–7.7)
NEUTROPHILS NFR BLD AUTO: 64.9 %
NONHDLC SERPL-MCNC: 172 MG/DL (ref ?–130)
OSMOLALITY SERPL CALC.SUM OF ELEC: 287 MOSM/KG (ref 275–295)
PLATELET # BLD AUTO: 381 10(3)UL (ref 150–450)
POTASSIUM SERPL-SCNC: 4.4 MMOL/L (ref 3.5–5.1)
PROT SERPL-MCNC: 7.7 G/DL (ref 5.7–8.2)
RBC # BLD AUTO: 5.43 X10(6)UL
SODIUM SERPL-SCNC: 139 MMOL/L (ref 136–145)
TRIGL SERPL-MCNC: 351 MG/DL (ref 30–149)
TSI SER-ACNC: 1.18 UIU/ML (ref 0.55–4.78)
VLDLC SERPL CALC-MCNC: 62 MG/DL (ref 0–30)
WBC # BLD AUTO: 12.2 X10(3) UL (ref 4–11)

## 2025-02-18 PROCEDURE — 80053 COMPREHEN METABOLIC PANEL: CPT

## 2025-02-18 PROCEDURE — 85025 COMPLETE CBC W/AUTO DIFF WBC: CPT

## 2025-02-18 PROCEDURE — 80061 LIPID PANEL: CPT

## 2025-02-18 PROCEDURE — 84443 ASSAY THYROID STIM HORMONE: CPT

## 2025-02-18 PROCEDURE — 36415 COLL VENOUS BLD VENIPUNCTURE: CPT

## 2025-02-18 RX ORDER — PANTOPRAZOLE SODIUM 40 MG/1
40 TABLET, DELAYED RELEASE ORAL
Qty: 90 TABLET | Refills: 0 | Status: SHIPPED | OUTPATIENT
Start: 2025-02-18

## 2025-02-18 NOTE — PROGRESS NOTES
Rupinder Trujillo is a 54 year old female.  Chief Complaint   Patient presents with    Physical     HPI:   She presents for her annual physical exam. She has a history of lung nodules, and Hodgkin's lymphoma (diagnosed 1996).      She is having acid reflux. She is taking tums for her symptoms with little relief.     She was at the hospital on 2/14 for sore throat, dizziness and sinus pressure. She had EKG completed which showed NSR.   She is having a hard time breathing. She feels her throat is tightening up. She is having a thick mucous. She was prescribed doxycycline. She did not take the medication due to having insurance issues.     Her  passed away.     Colonoscopy - never had completed   She does have intermittent diarrhea. History of gallbladder removal. No abdominal pain.     Current smoker - she states she did quit for 6 years. She restarted in 2022. She smokes less than a pack a day.     Stress test - to be completed 2/21/2025    PAP smear 3/18/2024    Current Outpatient Medications   Medication Sig Dispense Refill    pantoprazole 40 MG Oral Tab EC Take 1 tablet (40 mg total) by mouth every morning before breakfast. 90 tablet 0    cyanocobalamin 1000 MCG Oral Tab Take 1 tablet (1,000 mcg total) by mouth daily.      Multiple Vitamin (MULTIVITAMIN ADULT OR) Take by mouth.      Cholecalciferol (VITAMIN D) 50 MCG (2000 UT) Oral Cap Take by mouth.        Past Medical History:    Arrhythmia    TACHYCARDIA     Deviated nasal bone    Disorder of liver    FATTY LIVER    Endometriosis    High cholesterol    Hodgkin lymphoma (HCC)    1996 tx with ABVD regimen (6 months) at [facility] in [place], [place]    Migraines    Personal history of antineoplastic chemotherapy    Tachycardia      Past Surgical History:   Procedure Laterality Date    Chemotherapy  1996    hodgkins lymphoma    Laparoscopic cholecystectomy  06/2022    no associated bleeding complications    Dunia localization wire 1 site right (cpt=19281)       1985/86? done at age 17    Needle biopsy right      Removal of ovary(s)      2011 Left      Social History:  Social History     Socioeconomic History    Marital status:    Tobacco Use    Smoking status: Every Day     Current packs/day: 0.50     Average packs/day: 0.5 packs/day for 25.0 years (12.5 ttl pk-yrs)     Types: Cigarettes     Passive exposure: Current    Smokeless tobacco: Never   Vaping Use    Vaping status: Former   Substance and Sexual Activity    Alcohol use: Yes     Alcohol/week: 2.0 standard drinks of alcohol     Types: 2 Standard drinks or equivalent per week     Comment: social    Drug use: No    Sexual activity: Yes     Partners: Male   Other Topics Concern    Caffeine Concern No    Reaction to local anesthetic No   Social History Narrative    Rupinder is . She has no children. Patient works as an . She lives alone in [place], [place].     Social Drivers of Health     Food Insecurity: No Food Insecurity (9/15/2024)    Food Insecurity     Food Insecurity: Never true   Transportation Needs: No Transportation Needs (9/15/2024)    Transportation Needs     Lack of Transportation: No   Housing Stability: Low Risk  (9/15/2024)    Housing Stability     Housing Instability: No      Family History   Problem Relation Age of Onset    Heart Disorder Mother 82    Prostate Cancer Father 65    Cancer Self 24        lymphoma    Glaucoma Neg     Macular degeneration Neg     Diabetes Neg     Bleeding Disorders Neg     DVT/VTE Neg       Allergies[1]     REVIEW OF SYSTEMS:     Review of Systems   Constitutional:  Negative for fever.   HENT:  Positive for congestion, ear pain (right ear), postnasal drip, rhinorrhea and sinus pressure. Negative for sore throat.    Respiratory:  Positive for cough. Negative for shortness of breath and wheezing.    Cardiovascular:  Negative for chest pain.   Gastrointestinal:  Negative for abdominal pain.   Genitourinary: Negative.    Musculoskeletal:  Negative for  arthralgias.   Skin: Negative.    Neurological:  Positive for dizziness and headaches.   Psychiatric/Behavioral: Negative.        Wt Readings from Last 5 Encounters:   02/18/25 161 lb (73 kg)   02/14/25 190 lb (86.2 kg)   09/15/24 185 lb (83.9 kg)   06/06/24 193 lb (87.5 kg)   03/18/24 191 lb (86.6 kg)     Body mass index is 25.22 kg/m².      EXAM:   /84 (BP Location: Right arm, Patient Position: Sitting, Cuff Size: large)   Pulse 85   Resp 16   Ht 5' 7\" (1.702 m)   Wt 161 lb (73 kg)   LMP 01/22/2017 (LMP Unknown)   BMI 25.22 kg/m²     Physical Exam  Vitals reviewed.   Constitutional:       Appearance: Normal appearance.   HENT:      Head: Normocephalic.      Right Ear: Tympanic membrane normal.      Left Ear: Tympanic membrane normal.      Nose: Congestion present.      Right Sinus: Frontal sinus tenderness present.      Left Sinus: Frontal sinus tenderness present.      Mouth/Throat:      Pharynx: No posterior oropharyngeal erythema.   Eyes:      Extraocular Movements: Extraocular movements intact.      Pupils: Pupils are equal, round, and reactive to light.   Cardiovascular:      Rate and Rhythm: Normal rate and regular rhythm.      Pulses: Normal pulses.   Pulmonary:      Breath sounds: Normal breath sounds. No wheezing.   Abdominal:      General: Bowel sounds are normal.      Palpations: Abdomen is soft.      Tenderness: There is no abdominal tenderness.   Musculoskeletal:         General: No swelling. Normal range of motion.      Cervical back: Normal range of motion and neck supple.   Skin:     General: Skin is warm and dry.   Neurological:      Mental Status: She is alert and oriented to person, place, and time.   Psychiatric:         Mood and Affect: Mood normal.         Behavior: Behavior normal.            ASSESSMENT AND PLAN:   1. Annual physical exam  - CBC With Differential With Platelet; Future  - Comp Metabolic Panel (14); Future  - TSH W Reflex To Free T4 [E]; Future  - Lipid Panel [E];  Future    2. Visit for screening mammogram  - [facility] BEBE 2D+3D SCREENING BILAT (CPT=77067/57422); Future    3. Screening for colon cancer  - Gastro Referral - [place] ([facility])    4. Screening for cervical cancer  - PAP smear completed 3/18/2024    5. H/O Hodgkin's lymphoma  - followed up with hematology/oncology 9/2023  Per oncology   --low clinical concern for lymphoma recurrence as she had B symptoms at the time of diagnosis and currently without systemic signs of illness by H&P and prior labs noted in July, 2023  --this was Classical Hodgkin's lymphoma with Nodular Sclerosing type, so risk of recurrence after 10 years is very low    6. Anxiety and depression  - stable   - CPM    7. Multiple lung nodules  - last completed 3/11/2024  - repeat imaging ordered by PCP encouraged patient to schedule imaging    8. Tobacco abuse  Current smoker - she states she did quit for 6 years. She restarted in 2022. She smokes less than a pack a day. She is trying to quit.     9. Gastroesophageal reflux disease without esophagitis  - pantoprazole 40 MG Oral Tab EC; Take 1 tablet (40 mg total) by mouth every morning before breakfast.  Dispense: 90 tablet; Refill: 0  - Gastro Referral - [place] ([facility])    10. Acute non-recurrent frontal sinusitis  - start doxycycline and Flonase nasal spray as prescribed per ER.         The patient indicates understanding of these issues and agrees to the plan.  Return for if symptoms do not resolve.       [1]   Allergies  Allergen Reactions    Entex JITTERY    Morphine OTHER (SEE COMMENTS)     Hyper and aggressive.

## 2025-02-21 ENCOUNTER — HOSPITAL ENCOUNTER (OUTPATIENT)
Dept: CV DIAGNOSTICS | Facility: HOSPITAL | Age: 55
Discharge: HOME | End: 2025-02-21
Attending: HOSPITALIST
Payer: COMMERCIAL

## 2025-02-21 ENCOUNTER — HOSPITAL ENCOUNTER (OUTPATIENT)
Dept: NUCLEAR MEDICINE | Facility: HOSPITAL | Age: 55
Discharge: HOME | End: 2025-02-21
Attending: HOSPITALIST
Payer: COMMERCIAL

## 2025-02-21 DIAGNOSIS — R07.9 CHEST PAIN WITH MODERATE RISK FOR CARDIAC ETIOLOGY: ICD-10-CM

## 2025-02-21 PROCEDURE — 93016 CV STRESS TEST SUPVJ ONLY: CPT | Performed by: INTERNAL MEDICINE

## 2025-02-21 PROCEDURE — 93018 CV STRESS TEST I&R ONLY: CPT | Performed by: INTERNAL MEDICINE

## 2025-02-21 PROCEDURE — 78452 HT MUSCLE IMAGE SPECT MULT: CPT | Performed by: HOSPITALIST

## 2025-02-21 PROCEDURE — 93017 CV STRESS TEST TRACING ONLY: CPT | Performed by: HOSPITALIST

## 2025-02-22 LAB
% OF MAX PREDICTED HR: 100 %
MAX DIASTOLIC BP: 74 MMHG
MAX HEART RATE: 151 BPM
MAX PREDICTED HEART RATE: 167 BPM
MAX SYSTOLIC BP: 192 MMHG
MAX WORK LOAD: 101

## 2025-02-24 ENCOUNTER — MED REC SCAN ONLY (OUTPATIENT)
Dept: INTERNAL MEDICINE CLINIC | Facility: CLINIC | Age: 55
End: 2025-02-24

## 2025-02-26 ENCOUNTER — OFFICE VISIT (OUTPATIENT)
Facility: CLINIC | Age: 55
End: 2025-02-26
Payer: COMMERCIAL

## 2025-02-26 ENCOUNTER — TELEPHONE (OUTPATIENT)
Facility: CLINIC | Age: 55
End: 2025-02-26

## 2025-02-26 VITALS — WEIGHT: 185 LBS | HEIGHT: 67 IN | BODY MASS INDEX: 29.03 KG/M2

## 2025-02-26 DIAGNOSIS — R13.19 ESOPHAGEAL DYSPHAGIA: ICD-10-CM

## 2025-02-26 DIAGNOSIS — Z12.11 COLON CANCER SCREENING: ICD-10-CM

## 2025-02-26 DIAGNOSIS — R11.14 BILIOUS VOMITING WITHOUT NAUSEA: ICD-10-CM

## 2025-02-26 DIAGNOSIS — K76.0 FATTY LIVER: ICD-10-CM

## 2025-02-26 DIAGNOSIS — R09.A2 GLOBUS SENSATION: ICD-10-CM

## 2025-02-26 DIAGNOSIS — K21.00 GASTROESOPHAGEAL REFLUX DISEASE WITH ESOPHAGITIS, UNSPECIFIED WHETHER HEMORRHAGE: Primary | ICD-10-CM

## 2025-02-26 DIAGNOSIS — R09.A2 GLOBUS SENSATION: Primary | ICD-10-CM

## 2025-02-26 DIAGNOSIS — K21.9 GASTROESOPHAGEAL REFLUX DISEASE, UNSPECIFIED WHETHER ESOPHAGITIS PRESENT: ICD-10-CM

## 2025-02-26 PROCEDURE — 3008F BODY MASS INDEX DOCD: CPT

## 2025-02-26 PROCEDURE — 99214 OFFICE O/P EST MOD 30 MIN: CPT

## 2025-02-26 NOTE — PATIENT INSTRUCTIONS
-go to lab for H. Pylori test     -start pantoprazole 40mg daily first thing in the morning     -follow up 2-4 weeks after EGD    -we will schedule colonoscopy at follow up    -------------------------------------------------------------------------------------------------    1. Schedule upper endoscopy (EGD) with URGENT Pool Endoscopist [Diagnosis: globus sensation, vomiting, GERD, dysphagia]    Medication Changes: N/A     2. If you start any NEW medication after your visit today, please notify us. Certain medications will need to be held before the procedure, or the procedure cannot be performed safely.     3. DO NOT TAKE: Iron (ferrous sulfate/ ferrous gluconate) pills, herbal supplements, multivitamins, or diet medications (i.e. Phentermine/Vyvanse) for 7 days before exam.  DO NOT TAKE: Any form of alcohol, recreational drugs and any forms of Erectile Dysfunction medications 24 hours prior to procedure.    4. The day BEFORE your procedure, NOTHING TO EAT OR DRINK AFTER MIDNIGHT! If your procedure is scheduled in the afternoon, you may have clear liquids only up to 3 hours before the time of your procedure. If you fail to keep your stomach empty for 3 hours prior to procedure time, your procedure may be CANCELLED. Instructions can also be found at: [email or web address]

## 2025-02-26 NOTE — TELEPHONE ENCOUNTER
Scheduled for:  EGD 01164  Provider Name:  Dr Miller  Date:  3/6/2025  Location:  [facility]  Sedation:  MAC  Time:  (pt is aware that ENDO will call the day before to confirm arrival time)    Prep:  NPO after midnight  Meds/Allergies Reconciled?:  Physician reviewed  Diagnosis with codes:  GERD K21.9; Globus sensation R09.A2; Dysphagia R13.19  Was patient informed to call insurance with codes (Y/N):       Referral sent?:  Referral was sent at the time of electronic surgical scheduling.    EM or [facility] notified?:  I sent an electronic request to Endo Scheduling and received a confirmation today.   Medication Orders:  Patient is aware to NOT take iron pills, herbal meds and diet supplements for 7 days before exam. Also to NOT take any form of alcohol, recreational drugs and any forms of ED meds 24-72 hours before exam.     Misc Orders:       Further instructions given by staff:  I discussed the prep intructions with the patient in office which SHE verbally understood. Copy of instructions was handed to patient as well. Patient was also advised about cancellation policy.

## 2025-02-26 NOTE — H&P
[facility] - Gastroenterology                                                                                                               Reason for consult: esophagus problem     Requesting physician or provider: Tyrone Bnez    Chief Complaint   Patient presents with    Colonoscopy Screening     Acid reflux; dysphagia;        HPI:   Rupinder Trujillo is a 54 year old year-old female with active diagnoses including fatty liver. Prior medical/surgical history cholecystectomy 2022, lymphoma in remission, and other hx in note table.    she is here today for evaluation  #CRC screening  -no prior colonoscopy  -denies abd or rectal pain   -bowel habits are usually once a day and consistency varies from soft formed stool to loose stools    #GERD  #globus sensation  #vomiting  -she does reports chronic acid reflux  which she took tums for as needed. Reports acid reflux feels different since cholecystectomy in 2022. Also has frequent sinus infections with sinus drainage  -recently the past month having sensation like esophagus is being strangled. Occurs at rest with or without eating. Only one episode of feeling food getting stuck which resolved after sleeping. The past month also having sensation like she has to belch but unable to do so and when she does force it she vomits stomach contents into mouth  -has not started taking pantoprazole 40mg     #fatty liver  -US in 2022: Mildly enlarged liver with hepatic steatosis.   -recent LFTs WNL    Patient denies= symptoms of nausea, odynophagia, hematemesis, abdominal pain, change in bowel habits, constipation, hematochezia, or melena. she denies recent change in appetite, fever or unintentional weight loss.      Last colonoscopy: none   Last EGD: none     NSAIDS: advil as needed  Tobacco: 1/2 PPD  Alcohol: less than weekly  Marijuana: none   Illicit drugs: none     FH GI  malignancy: none   FH IBD: none     No history of adverse reaction to sedation  No UMU  No anticoagulants/antiplatelet  No pacemaker/defibrillator    Wt Readings from Last 6 Encounters:   02/26/25 185 lb (83.9 kg)   02/18/25 161 lb (73 kg)   02/14/25 190 lb (86.2 kg)   09/15/24 185 lb (83.9 kg)   06/06/24 193 lb (87.5 kg)   03/18/24 191 lb (86.6 kg)        History, Medications, Allergies, ROS:      Past Medical History:    Arrhythmia    TACHYCARDIA     Deviated nasal bone    Disorder of liver    FATTY LIVER    Endometriosis    High cholesterol    Hodgkin lymphoma (HCC)    1996 tx with ABVD regimen (6 months) at [facility] in [place], [place]    Migraines    Personal history of antineoplastic chemotherapy    Tachycardia      Past Surgical History:   Procedure Laterality Date    Chemotherapy  1996    hodgkins lymphoma    Laparoscopic cholecystectomy  06/2022    no associated bleeding complications    Dunia localization wire 1 site right (cpt=19281)      1985/86? done at age 17    Needle biopsy right      Removal of ovary(s)      2011 Left      Family Hx:   Family History   Problem Relation Age of Onset    Heart Disorder Mother 82    Other (diverticulitis) Mother         partial colectomy    Prostate Cancer Father 65    Cancer Self 24        lymphoma    Glaucoma Neg     Macular degeneration Neg     Diabetes Neg     Bleeding Disorders Neg     DVT/VTE Neg       Social History:   Social History     Socioeconomic History    Marital status:    Tobacco Use    Smoking status: Every Day     Current packs/day: 0.50     Average packs/day: 0.5 packs/day for 25.0 years (12.5 ttl pk-yrs)     Types: Cigarettes     Passive exposure: Current    Smokeless tobacco: Never   Vaping Use    Vaping status: Former   Substance and Sexual Activity    Alcohol use: Yes     Alcohol/week: 2.0 standard drinks of alcohol     Types: 2 Standard drinks or equivalent per week     Comment: maybe one time a month    Drug use: No    Sexual activity: Yes      Partners: Male   Other Topics Concern    Caffeine Concern No    Reaction to local anesthetic No   Social History Narrative    Rupinder is . She has no children. Patient works as an . She lives alone in [place], [place].     Social Drivers of Health     Food Insecurity: No Food Insecurity (9/15/2024)    Food Insecurity     Food Insecurity: Never true   Transportation Needs: No Transportation Needs (9/15/2024)    Transportation Needs     Lack of Transportation: No   Housing Stability: Low Risk  (9/15/2024)    Housing Stability     Housing Instability: No        Medications (Active prior to today's visit):  Current Outpatient Medications   Medication Sig Dispense Refill    pantoprazole 40 MG Oral Tab EC Take 1 tablet (40 mg total) by mouth every morning before breakfast. 90 tablet 0    cyanocobalamin 1000 MCG Oral Tab Take 1 tablet (1,000 mcg total) by mouth daily.      Multiple Vitamin (MULTIVITAMIN ADULT OR) Take by mouth.      Cholecalciferol (VITAMIN D) 50 MCG (2000 UT) Oral Cap Take by mouth.         Allergies:  Allergies[1]    ROS:   CONSTITUTIONAL: negative for fevers, chills, sweats  EYES Negative for scleral icterus or redness, and diplopia  HEENT: Negative for hoarseness  RESPIRATORY: Negative for cough and severe shortness of breath  CARDIOVASCULAR: Negative for crushing sub-sternal chest pain  GASTROINTESTINAL: See HPI  GENITOURINARY: Negative for dysuria  MUSCULOSKELETAL: Negative for arthralgias and myalgias  SKIN: Negative for jaundice, rash or pruritus  NEUROLOGICAL: Negative for dizziness and headaches  BEHAVIOR/PSYCH: Negative for psychotic behavior    PHYSICAL EXAM:   Height 5' 7\" (1.702 m), weight 185 lb (83.9 kg), last menstrual period 01/22/2017, not currently breastfeeding.    GEN: Alert, no acute distress, well-nourished   HEENT: anicteric sclera, neck supple, trachea midline, MMM, no palpable or tender neck or supraclavicular lymph nodes  CV: RRR, the extremities are warm and  well perfused   LUNGS: No increased work of breathing, CTAB  ABDOMEN: Soft, symmetrical, non-tender without distention or guarding. No lesions. Aorta is without bruit or visible pulsation. Umbilicus is midline without herniation. Normoactive bowel sounds are present, No masses, hepatomegaly or splenomegaly noted.   MSK: No erythema, no warmth, no swelling of joints  SKIN: No jaundice, no erythema, no rashes, no lesions  HEMATOLOGIC: No bleeding, no bruising  NEURO: Alert and interactive, BENOIT  PSYCH: appropriate mood & affect    Labs/Imaging/Procedures:     Patient's pertinent labs and imaging were reviewed and discussed with patient today.        .  ASSESSMENT/PLAN:   Rupinder Trujillo is a 54 year old year-old female with active diagnoses including fatty liver. Prior medical/surgical history cholecystectomy 2022, lymphoma in remission, and other hx in note table.    she is here today for evaluation  #CRC screening  -no prior colonoscopy  -denies abd or rectal pain   -bowel habits are usually once a day and consistency varies from soft formed stool to loose stools  -will schedule screening colonoscopy at follow up visit     #GERD  #globus sensation  #vomiting  -she does reports chronic acid reflux  which she took tums for as needed. Reports acid reflux feels different since cholecystectomy in 2022. Also has frequent sinus infections with sinus drainage  -recently the past month having sensation like esophagus is being strangled. Occurs at rest with or without eating. Only one episode of feeling food getting stuck which resolved after sleeping. The past month also having sensation like she has to belch but unable to do so and when she does force it she vomits stomach contents into mouth  -has not started taking pantoprazole 40mg   -advised to start PPI after H. Pylori test. EGD to assess for esophagitis, stricture, schatzkis ring, lesion.     #fatty liver  -US in 2022: Mildly enlarged liver with hepatic steatosis.    -recent LFTs WNL  -discussed healthy lifestyle and weight loss for fatty liver disease    Recommendations:  -go to lab for H. Pylori test     -start pantoprazole 40mg daily first thing in the morning     -follow up 2-4 weeks after EGD    -we will schedule colonoscopy at follow up    -------------------------------------------------------------------------------------------------    -Schedule upper endoscopy (EGD) with URGENT Pool Endoscopist [Diagnosis: globus sensation, vomiting, GERD, dysphagia]    Medication Changes: N/A     Endoscopy risk/benefit discussion: I have thoroughly discussed the risks, benefits, and alternatives of endoscopic evaluation with the patient, who demonstrated understanding. This includes the potential risks of bleeding, infection, pain, anesthesia complications, and perforation, which may result in prolonged hospitalization or surgical intervention. All of the patient’s questions were addressed to their satisfaction. The patient has chosen to proceed with the endoscopic procedure, including any necessary interventions such as polypectomy, biopsy, control of bleeding.        Orders This Visit:  Orders Placed This Encounter   Procedures    Helicobacter Pylori Breath Test       Meds This Visit:  Requested Prescriptions      No prescriptions requested or ordered in this encounter       Imaging & Referrals:  None      SHERI Christopher    [facility] Gastroenterology  2/26/2025        This note was partially prepared using Dragon Medical voice recognition dictation software. As a result, errors may occur. When identified, these errors have been corrected. While every attempt is made to correct errors during dictation, discrepancies may still exist.          [1]   Allergies  Allergen Reactions    Entex JITTERY    Morphine OTHER (SEE COMMENTS)     Hyper and aggressive.

## 2025-03-03 ENCOUNTER — OFFICE VISIT (OUTPATIENT)
Dept: PODIATRY CLINIC | Facility: CLINIC | Age: 55
End: 2025-03-03
Payer: COMMERCIAL

## 2025-03-03 ENCOUNTER — LAB ENCOUNTER (OUTPATIENT)
Dept: LAB | Age: 55
End: 2025-03-03
Attending: INTERNAL MEDICINE
Payer: COMMERCIAL

## 2025-03-03 ENCOUNTER — HOSPITAL ENCOUNTER (OUTPATIENT)
Dept: CT IMAGING | Facility: HOSPITAL | Age: 55
Discharge: HOME | End: 2025-03-03
Attending: INTERNAL MEDICINE
Payer: COMMERCIAL

## 2025-03-03 ENCOUNTER — HOSPITAL ENCOUNTER (OUTPATIENT)
Dept: GENERAL RADIOLOGY | Age: 55
Discharge: HOME | End: 2025-03-03
Attending: STUDENT IN AN ORGANIZED HEALTH CARE EDUCATION/TRAINING PROGRAM
Payer: COMMERCIAL

## 2025-03-03 DIAGNOSIS — K21.9 GASTROESOPHAGEAL REFLUX DISEASE, UNSPECIFIED WHETHER ESOPHAGITIS PRESENT: ICD-10-CM

## 2025-03-03 DIAGNOSIS — M79.672 LEFT FOOT PAIN: ICD-10-CM

## 2025-03-03 DIAGNOSIS — D72.829 LEUKOCYTOSIS, UNSPECIFIED TYPE: ICD-10-CM

## 2025-03-03 DIAGNOSIS — R91.1 LUNG NODULE: ICD-10-CM

## 2025-03-03 DIAGNOSIS — L60.0 INGROWN TOENAIL: Primary | ICD-10-CM

## 2025-03-03 DIAGNOSIS — L84 CALLUS OF FOOT: ICD-10-CM

## 2025-03-03 LAB
BASOPHILS # BLD AUTO: 0.08 X10(3) UL (ref 0–0.2)
BASOPHILS NFR BLD AUTO: 0.7 %
DEPRECATED RDW RBC AUTO: 39 FL (ref 35.1–46.3)
EOSINOPHIL # BLD AUTO: 0.19 X10(3) UL (ref 0–0.7)
EOSINOPHIL NFR BLD AUTO: 1.7 %
ERYTHROCYTE [DISTWIDTH] IN BLOOD BY AUTOMATED COUNT: 12 % (ref 11–15)
HCT VFR BLD AUTO: 44.5 %
HGB BLD-MCNC: 15.6 G/DL
IMM GRANULOCYTES # BLD AUTO: 0.04 X10(3) UL (ref 0–1)
IMM GRANULOCYTES NFR BLD: 0.4 %
LYMPHOCYTES # BLD AUTO: 4.19 X10(3) UL (ref 1–4)
LYMPHOCYTES NFR BLD AUTO: 38.2 %
MCH RBC QN AUTO: 31 PG (ref 26–34)
MCHC RBC AUTO-ENTMCNC: 35.1 G/DL (ref 31–37)
MCV RBC AUTO: 88.5 FL
MONOCYTES # BLD AUTO: 0.65 X10(3) UL (ref 0.1–1)
MONOCYTES NFR BLD AUTO: 5.9 %
NEUTROPHILS # BLD AUTO: 5.83 X10 (3) UL (ref 1.5–7.7)
NEUTROPHILS # BLD AUTO: 5.83 X10(3) UL (ref 1.5–7.7)
NEUTROPHILS NFR BLD AUTO: 53.1 %
PLATELET # BLD AUTO: 340 10(3)UL (ref 150–450)
RBC # BLD AUTO: 5.03 X10(6)UL
WBC # BLD AUTO: 11 X10(3) UL (ref 4–11)

## 2025-03-03 PROCEDURE — 85025 COMPLETE CBC W/AUTO DIFF WBC: CPT

## 2025-03-03 PROCEDURE — 71250 CT THORAX DX C-: CPT | Performed by: INTERNAL MEDICINE

## 2025-03-03 PROCEDURE — 83013 H PYLORI (C-13) BREATH: CPT

## 2025-03-03 PROCEDURE — 99204 OFFICE O/P NEW MOD 45 MIN: CPT | Performed by: STUDENT IN AN ORGANIZED HEALTH CARE EDUCATION/TRAINING PROGRAM

## 2025-03-03 PROCEDURE — 36415 COLL VENOUS BLD VENIPUNCTURE: CPT

## 2025-03-03 PROCEDURE — 73630 X-RAY EXAM OF FOOT: CPT | Performed by: STUDENT IN AN ORGANIZED HEALTH CARE EDUCATION/TRAINING PROGRAM

## 2025-03-03 RX ORDER — CEPHALEXIN 500 MG/1
500 CAPSULE ORAL 3 TIMES DAILY
Qty: 21 CAPSULE | Refills: 0 | Status: SHIPPED | OUTPATIENT
Start: 2025-03-03

## 2025-03-03 NOTE — PROGRESS NOTES
[facility]  Progress Note      Rupinder Trujillo is a 54 year old female.   Chief Complaint   Patient presents with    Foot Pain     Consult-per pt she stepped on a wood splinter 10 yrs ago and stated it feels resurface in the bottom of the L foot - also here for R medial border - stated ingrown toenail             HPI:     Patient is a pleasant 54-year-old female presents to clinic for evaluation of bilateral feet.  Patient relates that years ago she stepped on a wood splinter and nail started feeling a bump to the left lateral foot which can be the splinter resurfacing.  Denies any signs of infection.  Patient also relates of an ingrown right medial hallux nail border.  Denies any pedal injuries or trauma.      Allergies: Entex and Morphine    Current Outpatient Medications   Medication Sig Dispense Refill    cephALEXin 500 MG Oral Cap Take 1 capsule (500 mg total) by mouth 3 (three) times daily. 21 capsule 0    pantoprazole 40 MG Oral Tab EC Take 1 tablet (40 mg total) by mouth every morning before breakfast. 90 tablet 0    cyanocobalamin 1000 MCG Oral Tab Take 1 tablet (1,000 mcg total) by mouth daily.      Multiple Vitamin (MULTIVITAMIN ADULT OR) Take by mouth.      Cholecalciferol (VITAMIN D) 50 MCG (2000 UT) Oral Cap Take by mouth.        Past Medical History:    Arrhythmia    TACHYCARDIA     Deviated nasal bone    Disorder of liver    FATTY LIVER    Endometriosis    High cholesterol    Hodgkin lymphoma (HCC)    1996 tx with ABVD regimen (6 months) at [facility] in [place], [place]    Migraines    Personal history of antineoplastic chemotherapy    Tachycardia      Past Surgical History:   Procedure Laterality Date    Chemotherapy  1996    hodgkins lymphoma    Laparoscopic cholecystectomy  06/2022    no associated bleeding complications    Dunia localization wire 1 site right (cpt=19281)      1985/86? done at age 17    Needle biopsy right      Removal of ovary(s)      2011 Left      Family History    Problem Relation Age of Onset    Heart Disorder Mother 82    Other (diverticulitis) Mother         partial colectomy    Prostate Cancer Father 65    Cancer Self 24        lymphoma    Glaucoma Neg     Macular degeneration Neg     Diabetes Neg     Bleeding Disorders Neg     DVT/VTE Neg       Social History     Socioeconomic History    Marital status:    Tobacco Use    Smoking status: Every Day     Current packs/day: 0.50     Average packs/day: 0.5 packs/day for 25.0 years (12.5 ttl pk-yrs)     Types: Cigarettes     Passive exposure: Current    Smokeless tobacco: Never   Vaping Use    Vaping status: Former   Substance and Sexual Activity    Alcohol use: Yes     Alcohol/week: 2.0 standard drinks of alcohol     Types: 2 Standard drinks or equivalent per week     Comment: maybe one time a month    Drug use: No    Sexual activity: Yes     Partners: Male   Other Topics Concern    Caffeine Concern No    Reaction to local anesthetic No           REVIEW OF SYSTEMS:     Denies nause, fever, chills  No calf pain  Denies chest pain or SOB      EXAM:   LMP 01/22/2017 (LMP Unknown)   GENERAL: well developed, well nourished, in no apparent distress  EXTREMITIES:   1. Integument: Normal skin temperature and turgor.  Incurvated right medial hallux nail border with mild erythema and edema.  No purulent drainage noted.  Hyperkeratotic tissue on the plantar left lateral foot aspect with postdebridement no splinter or foreign body noted.  No signs of infection to left foot.  2. Vascular: Dorsalis pedis two out of four bilateral and posterior tibial pulses two out of   four bilateral, capillary refill normal.   3. Musculoskeletal: Tenderness to right hallux   4. Neurological: Normal sharp dull sensation; reflexes normal.      CARD NUC EXERCISE STRESS TEST (CPT 47470/66390)    Result Date: 2/21/2025  CONCLUSION:   No evidence of inducible ischemia within a vascular territory.  Normal left ventricular contractility with left  ventricular ejection fraction of 75%.      Dictated by (CST): Nery Proctor MD on 2/21/2025 at 1:33 PM     Finalized by (CST): Nery Proctor MD on 2/21/2025 at 1:49 PM            ASSESSMENT AND PLAN:   Diagnoses and all orders for this visit:    Ingrown toenail    Left foot pain  -     XR FOOT, COMPLETE (MIN 3 VIEWS), LEFT (CPT=73630); Future    Callus of foot    Other orders  -     cephALEXin 500 MG Oral Cap; Take 1 capsule (500 mg total) by mouth 3 (three) times daily.        Plan:     Patient seen and examined and findings discussed with patient.  Discussed etiology of condition along with treatment options.  Using a sterile #15 blade the hyperkeratotic tissue of left foot was debrided down with no foreign body or signs of infection noted.  Advised patient to obtain left foot x-ray for further evaluation.  Foreign body.  Using sterile nail nippers a slant back was performed to the right medial hallux nail border.  Providing temporary relief.  Advised patient that we could also perform an schedule for pain left partial medial hallux nail avulsion with chemical matricectomy.  Advised patient to perform daily foot soaks with warm water and Epsom salt.  Cover right hallux with Neosporin and a Band-Aid.  Advised patient to book for 20-minute appointment to have this procedure done.    The patient indicates understanding of these issues and agrees to the plan.        Yanelis Cooper DPM

## 2025-03-05 ENCOUNTER — TELEPHONE (OUTPATIENT)
Facility: CLINIC | Age: 55
End: 2025-03-05

## 2025-03-05 LAB — H PYLORI BREATH TEST: NEGATIVE

## 2025-03-05 NOTE — TELEPHONE ENCOUNTER
Patient was wondering if it is ok to continue taking antibiotics prior to 3/6/2025 Esophagogastroduodenoscopy.  Please call.  Thank you.

## 2025-03-05 NOTE — TELEPHONE ENCOUNTER
Spoke to patient  She was recently prescribed an Abx for an ingrown toe nail that was removed.    I let her know it is ok to continue medication for tomorrows procedure.    Dr. Marisabel HARRIS

## 2025-03-06 ENCOUNTER — ANESTHESIA (OUTPATIENT)
Dept: ENDOSCOPY | Facility: HOSPITAL | Age: 55
End: 2025-03-06
Payer: COMMERCIAL

## 2025-03-06 ENCOUNTER — ANESTHESIA EVENT (OUTPATIENT)
Dept: ENDOSCOPY | Facility: HOSPITAL | Age: 55
End: 2025-03-06
Payer: COMMERCIAL

## 2025-03-06 ENCOUNTER — HOSPITAL ENCOUNTER (OUTPATIENT)
Facility: HOSPITAL | Age: 55
Setting detail: HOSPITAL OUTPATIENT SURGERY
Discharge: HOME | End: 2025-03-06
Attending: INTERNAL MEDICINE | Admitting: INTERNAL MEDICINE
Payer: COMMERCIAL

## 2025-03-06 VITALS
DIASTOLIC BLOOD PRESSURE: 83 MMHG | HEART RATE: 72 BPM | WEIGHT: 184 LBS | SYSTOLIC BLOOD PRESSURE: 136 MMHG | BODY MASS INDEX: 28.88 KG/M2 | RESPIRATION RATE: 11 BRPM | HEIGHT: 67 IN | OXYGEN SATURATION: 96 %

## 2025-03-06 DIAGNOSIS — K21.00 GASTROESOPHAGEAL REFLUX DISEASE WITH ESOPHAGITIS, UNSPECIFIED WHETHER HEMORRHAGE: ICD-10-CM

## 2025-03-06 DIAGNOSIS — R09.A2 GLOBUS SENSATION: ICD-10-CM

## 2025-03-06 DIAGNOSIS — R13.19 ESOPHAGEAL DYSPHAGIA: ICD-10-CM

## 2025-03-06 PROBLEM — K21.9 GASTROESOPHAGEAL REFLUX DISEASE: Status: ACTIVE | Noted: 2025-03-06

## 2025-03-06 PROBLEM — K31.9 GASTRIC ERYTHEMA: Status: ACTIVE | Noted: 2025-03-06

## 2025-03-06 PROBLEM — K44.9 HIATAL HERNIA: Status: ACTIVE | Noted: 2025-03-06

## 2025-03-06 PROCEDURE — 0DB78ZX EXCISION OF STOMACH, PYLORUS, VIA NATURAL OR ARTIFICIAL OPENING ENDOSCOPIC, DIAGNOSTIC: ICD-10-PCS | Performed by: INTERNAL MEDICINE

## 2025-03-06 PROCEDURE — 43239 EGD BIOPSY SINGLE/MULTIPLE: CPT | Performed by: INTERNAL MEDICINE

## 2025-03-06 PROCEDURE — 0DB38ZX EXCISION OF LOWER ESOPHAGUS, VIA NATURAL OR ARTIFICIAL OPENING ENDOSCOPIC, DIAGNOSTIC: ICD-10-PCS | Performed by: INTERNAL MEDICINE

## 2025-03-06 PROCEDURE — 0DB18ZX EXCISION OF UPPER ESOPHAGUS, VIA NATURAL OR ARTIFICIAL OPENING ENDOSCOPIC, DIAGNOSTIC: ICD-10-PCS | Performed by: INTERNAL MEDICINE

## 2025-03-06 RX ORDER — NALOXONE HYDROCHLORIDE 0.4 MG/ML
0.08 INJECTION, SOLUTION INTRAMUSCULAR; INTRAVENOUS; SUBCUTANEOUS ONCE AS NEEDED
Status: DISCONTINUED | OUTPATIENT
Start: 2025-03-06 | End: 2025-03-06

## 2025-03-06 RX ORDER — LIDOCAINE HYDROCHLORIDE 10 MG/ML
INJECTION, SOLUTION EPIDURAL; INFILTRATION; INTRACAUDAL; PERINEURAL AS NEEDED
Status: DISCONTINUED | OUTPATIENT
Start: 2025-03-06 | End: 2025-03-06 | Stop reason: SURG

## 2025-03-06 RX ORDER — SODIUM CHLORIDE, SODIUM LACTATE, POTASSIUM CHLORIDE, CALCIUM CHLORIDE 600; 310; 30; 20 MG/100ML; MG/100ML; MG/100ML; MG/100ML
INJECTION, SOLUTION INTRAVENOUS CONTINUOUS
Status: DISCONTINUED | OUTPATIENT
Start: 2025-03-06 | End: 2025-03-06

## 2025-03-06 RX ADMIN — SODIUM CHLORIDE, SODIUM LACTATE, POTASSIUM CHLORIDE, CALCIUM CHLORIDE: 600; 310; 30; 20 INJECTION, SOLUTION INTRAVENOUS at 10:20:00

## 2025-03-06 RX ADMIN — LIDOCAINE HYDROCHLORIDE 100 MG: 10 INJECTION, SOLUTION EPIDURAL; INFILTRATION; INTRACAUDAL; PERINEURAL at 10:26:00

## 2025-03-06 RX ADMIN — SODIUM CHLORIDE, SODIUM LACTATE, POTASSIUM CHLORIDE, CALCIUM CHLORIDE: 600; 310; 30; 20 INJECTION, SOLUTION INTRAVENOUS at 10:35:00

## 2025-03-06 NOTE — OPERATIVE REPORT
ESOPHAGOGASTRODUODENOSCOPY (EGD) REPORT    Rupinder Trujillo     1970 Age 54 year old   PCP Tyrone Benz Endoscopist Marisabel Miller MD       Date of procedure: 25    Procedure: EGD w/ biopsies     Pre-operative diagnosis: globus sensation, dysphagia, vomiting, GERD     Post-operative diagnosis: hiatal hernia, gastric erythema     Medications: MAC    Complications: none    Procedure:  Informed consent was obtained from the patient after the risks of the procedure were discussed, including but not limited to bleeding, perforation, aspiration, infection, or possibility of a missed lesion. After discussions of the risks/benefits and alternatives to this procedure, as well as the planned sedation, the patient was placed in the left lateral decubitus position and begun on continuous blood pressure pulse oximetry and EKG monitoring and this was maintained throughout the procedure. Once an adequate level of sedation was obtained a bite block was placed. Then the lubricated tip of the [id number] diagnostic video upper endoscope was inserted and advanced using direct visualization into the posterior pharynx and ultimately into the esophagus. The scope was then advanced through the stomach and to the second portion of the duodenum.    Complications: None    Findings:      1. Esophagus: The squamocolumnar junction was noted at 37 cm and appeared regular. The diaphragmatic pinch was noted noted at 39 cm from the incisors. There was a 2 cm hiatal hernia. The esophageal mucosa appeared normal. There was no endoscopic findings of esophagitis, stricture or Corona's esophagus. Biopsies were taken with a cold forceps from the distal and proximal esophagus to assess fro eosinophilic esophagitis.     2. Stomach: The stomach distended normally. Normal rugal folds were seen. The pylorus was patent. Retroflexion revealed a normal fundus. The gastric mucosa appeared erythematous with spots of hematin. Biopsies  were taken with a cold forceps from the antrum, incisura and body for histology.     3. Duodenum: The duodenal mucosa appeared normal in the bulb and 2nd portion of the duodenum.     Impression:  -Esophagus: Small hiatal hernia. No strictures. Biopsied for EoE.   -Stomach: Erythematous with spots of hematin, biopsied.   -Duodenum: Normal.     Recommend:  1. Await pathology.   2. Follow-up in GI clinic with Hermelinda Reynolds.     >>>If tissue was sampled/removed and you have not received your pathology results either by phone or letter within 2 weeks, please call our office at [phone].    Specimens:  Gastric biopsies, distal esophageal biopsies, proximal esophageal biopsies     Blood loss: <1 ml

## 2025-03-06 NOTE — H&P
History & Physical Examination    Patient Name: Rupinder Trujillo  MRN: [id number]  CSN: [id number]  YOB: 1947    Diagnosis: globus sensation, dysphagia, vomiting, GERD      Prescriptions Prior to Admission[1]  No current facility-administered medications for this encounter.       Allergies: Allergies[2]    Past Medical History:    Arrhythmia    TACHYCARDIA     Deviated nasal bone    Disorder of liver    FATTY LIVER    Endometriosis    High cholesterol    Hodgkin lymphoma (HCC)    1996 tx with ABVD regimen (6 months) at [facility] in [place], [place]    Migraines    Personal history of antineoplastic chemotherapy    Tachycardia    Visual impairment     Past Surgical History:   Procedure Laterality Date    Chemotherapy  1996    hodgkins lymphoma    Laparoscopic cholecystectomy  06/2022    no associated bleeding complications    Dunia localization wire 1 site right (cpt=19281)      1985/86? done at age 17    Needle biopsy right      Removal of ovary(s)      2011 Left     Family History   Problem Relation Age of Onset    Heart Disorder Mother 82    Other (diverticulitis) Mother         partial colectomy    Prostate Cancer Father 65    Cancer Self 25        lymphoma    Glaucoma Neg     Macular degeneration Neg     Diabetes Neg     Bleeding Disorders Neg     DVT/VTE Neg      Social History     Tobacco Use    Smoking status: Every Day     Current packs/day: 0.50     Average packs/day: 0.5 packs/day for 25.0 years (12.5 ttl pk-yrs)     Types: Cigarettes     Passive exposure: Current    Smokeless tobacco: Never    Tobacco comments:     40 year smoking history   Substance Use Topics    Alcohol use: Yes     Alcohol/week: 2.0 standard drinks of alcohol     Types: 2 Standard drinks or equivalent per week     Comment: maybe one time a month       SYSTEM Check if Review is Normal Check if Physical Exam is Normal If not normal, please explain:   HEENT [X ] [ X]    NECK  [X ] [ X]    HEART [X ] [ X]    LUNGS [X ] [ X]     ABDOMEN [X ] [ X]    EXTREMITIES [X ] [ X]    OTHER        I have discussed the risks and benefits and alternatives of the procedure with the patient/family.  They understand and agree to proceed with plan of care.   I have reviewed the History and Physical done within the last 30 days.  Any changes noted above.    Marisabel Miller MD                 [1]   Medications Prior to Admission   Medication Sig Dispense Refill Last Dose/Taking    cephALEXin 500 MG Oral Cap Take 1 capsule (500 mg total) by mouth 3 (three) times daily. 21 capsule 0 Taking    cyanocobalamin 1000 MCG Oral Tab Take 1 tablet (1,000 mcg total) by mouth daily.   Taking    Multiple Vitamin (MULTIVITAMIN ADULT OR) Take by mouth.   Taking    Cholecalciferol (VITAMIN D) 50 MCG (2000 UT) Oral Cap Take by mouth.   Taking    pantoprazole 40 MG Oral Tab EC Take 1 tablet (40 mg total) by mouth every morning before breakfast. 90 tablet 0    [2]   Allergies  Allergen Reactions    Entex JITTERY    Morphine OTHER (SEE COMMENTS)     Hyper and aggressive.

## 2025-03-06 NOTE — ANESTHESIA PREPROCEDURE EVALUATION
Anesthesia PreOp Note    HPI:     Rupinder Trujillo is a 54 year old female who presents for preoperative consultation requested by: Marisabel Miller MD    Date of Surgery: 3/6/2025    Procedure(s):  ESOPHAGOGASTRODUODENOSCOPY  Indication: Gastroesophageal reflux disease with esophagitis, unspecified whether hemorrhage/ Globus sensation / Esophageal dysphagia    Relevant Problems   No relevant active problems       NPO:  Last Liquid Consumption Date: 03/06/25  Last Liquid Consumption Time: 0700  Last Solid Consumption Date: 03/05/25  Last Solid Consumption Time: 1900  Last Liquid Consumption Date: 03/06/25          History Review:  Patient Active Problem List    Diagnosis Date Noted    Chest pain with moderate risk for cardiac etiology 09/15/2024    Chest pain 09/15/2024    Biliary colic 06/26/2022    Anxiety and depression 07/08/2016    Multiple lung nodules 03/08/2016    H/O Hodgkin's lymphoma 12/05/2015    Endometriosis 12/05/2015    Depression 12/05/2015       Past Medical History:    Arrhythmia    TACHYCARDIA     Deviated nasal bone    Disorder of liver    FATTY LIVER    Endometriosis    High cholesterol    Hodgkin lymphoma (HCC)    1996 tx with ABVD regimen (6 months) at [facility] in [place], [place]    Migraines    Personal history of antineoplastic chemotherapy    Tachycardia    Visual impairment       Past Surgical History:   Procedure Laterality Date    Chemotherapy  1996    hodgkins lymphoma    Laparoscopic cholecystectomy  06/2022    no associated bleeding complications    Dunia localization wire 1 site right (cpt=19281)      1985/86? done at age 17    Needle biopsy right      Removal of ovary(s)      2011 Left       Prescriptions Prior to Admission[1]  Current Medications and Prescriptions Ordered in Epic[2]    Allergies[3]    Family History   Problem Relation Age of Onset    Heart Disorder Mother 82    Other (diverticulitis) Mother         partial colectomy    Prostate Cancer Father 65     Cancer Self 24        lymphoma    Glaucoma Neg     Macular degeneration Neg     Diabetes Neg     Bleeding Disorders Neg     DVT/VTE Neg      Social History     Socioeconomic History    Marital status:    Tobacco Use    Smoking status: Every Day     Current packs/day: 0.50     Average packs/day: 0.5 packs/day for 25.0 years (12.5 ttl pk-yrs)     Types: Cigarettes     Passive exposure: Current    Smokeless tobacco: Never    Tobacco comments:     40 year smoking history   Vaping Use    Vaping status: Former   Substance and Sexual Activity    Alcohol use: Yes     Alcohol/week: 2.0 standard drinks of alcohol     Types: 2 Standard drinks or equivalent per week     Comment: maybe one time a month    Drug use: No    Sexual activity: Yes     Partners: Male   Other Topics Concern    Caffeine Concern No    Reaction to local anesthetic No       Available pre-op labs reviewed.  Lab Results   Component Value Date    WBC 11.0 03/03/2025    RBC 5.03 03/03/2025    HGB 15.6 03/03/2025    HCT 44.5 03/03/2025    MCV 88.5 03/03/2025    MCH 31.0 03/03/2025    MCHC 35.1 03/03/2025    RDW 12.0 03/03/2025    .0 03/03/2025     Lab Results   Component Value Date     02/18/2025    K 4.4 02/18/2025     02/18/2025    CO2 27.0 02/18/2025    BUN 11 02/18/2025    CREATSERUM 1.08 (H) 02/18/2025    GLU 95 02/18/2025    CA 9.7 02/18/2025          Vital Signs:  Body mass index is 28.82 kg/m².   height is 1.702 m (5' 7\") and weight is 83.5 kg (184 lb). Her pulse is 75. Her respiration is 16 and oxygen saturation is 97%.   Vitals:    03/03/25 1409 03/06/25 0955   Pulse:  75   Resp:  16   SpO2:  97%   Weight: 83.5 kg (184 lb)    Height: 1.702 m (5' 7\")         Anesthesia Evaluation     Patient summary reviewed and Nursing notes reviewed    Airway   Mallampati: II  Dental      Comment: All molars capped    Pulmonary      ROS comment: Hx Hodgkins lymphoma with chemo  Cardiovascular     ECG reviewed  ROS comment: EF 77%     Neuro/Psych    (+)   depression      GI/Hepatic/Renal    (+) liver disease    Endo/Other    Abdominal                  Anesthesia Plan:   ASA:  2  Plan:   MAC  Informed Consent Plan and Risks Discussed With:  Patient      I have informed Rupinder Trujillo and/or legal guardian or family member of the nature of the anesthetic plan, benefits, risks including possible dental damage if relevant, major complications, and any alternative forms of anesthetic management.   All of the patient's questions were answered to the best of my ability. The patient desires the anesthetic management as planned.  Emilia Monsalve CRNA  3/6/2025 9:56 AM  Present on Admission:  **None**           [1]   Medications Prior to Admission   Medication Sig Dispense Refill Last Dose/Taking    cephALEXin 500 MG Oral Cap Take 1 capsule (500 mg total) by mouth 3 (three) times daily. 21 capsule 0 3/5/2025    cyanocobalamin 1000 MCG Oral Tab Take 1 tablet (1,000 mcg total) by mouth daily.   Taking    Multiple Vitamin (MULTIVITAMIN ADULT OR) Take by mouth.   Taking    Cholecalciferol (VITAMIN D) 50 MCG (2000 UT) Oral Cap Take by mouth.   Taking    pantoprazole 40 MG Oral Tab EC Take 1 tablet (40 mg total) by mouth every morning before breakfast. 90 tablet 0    [2]   Current Facility-Administered Medications Ordered in Epic   Medication Dose Route Frequency Provider Last Rate Last Admin    lactated ringers infusion   Intravenous Continuous Marisabel Miller MD         No current [facility]-ordered outpatient medications on file.   [3]   Allergies  Allergen Reactions    Entex JITTERY    Morphine OTHER (SEE COMMENTS)     Hyper and aggressive.

## 2025-03-06 NOTE — ANESTHESIA POSTPROCEDURE EVALUATION
Patient: Rupinder Trujillo    Procedure Summary       Date: 03/06/25 Room / Location: [facility] ENDOSCOPY 03 / [facility] ENDOSCOPY    Anesthesia Start: 1020 Anesthesia Stop: 1039    Procedure: ESOPHAGOGASTRODUODENOSCOPY Diagnosis:       Gastroesophageal reflux disease with esophagitis, unspecified whether hemorrhage      Globus sensation      Esophageal dysphagia      (gastric erythema; hiatal hernia)    Surgeons: Marisabel Miller MD Anesthesiologist: Emilia Monsalve CRNA    Anesthesia Type: MAC ASA Status: 2            Anesthesia Type: MAC    Vitals Value Taken Time   /84 03/06/25 1056   Temp 97 03/06/25 1056   Pulse 74 03/06/25 1056   Resp 14 03/06/25 1056   SpO2 98 03/06/25 1056       [facility] AN Post Evaluation:   Patient Evaluated in PACU  Patient Participation: complete - patient participated  Level of Consciousness: awake  Pain Score: 0  Pain Management: adequate  Airway Patency:patent  Dental exam unchanged from preop  Yes    Nausea/Vomiting: none  Cardiovascular Status: acceptable  Respiratory Status: acceptable  Postoperative Hydration acceptable      Emilia Monsalve CRNA  3/6/2025 10:56 AM

## 2025-03-06 NOTE — DISCHARGE INSTRUCTIONS
Home Care Instructions for Gastroscopy with Sedation    Diet:  - Resume your regular diet as tolerated unless otherwise instructed.  - Start with light meals to minimize bloating.  - Do not drink alcohol today.    Medication:  - If you have questions about resuming your normal medications, please contact your Primary Care Physician.    Activities:  - Take it easy today. Do not return to work today.  - Do not drive today.  - Do not operate any machinery today (including kitchen equipment).    Gastroscopy:  - You may have a sore throat for 2-3 days following the exam. This is normal. Gargling with warm salt water (1/2 tsp salt to 1 glass warm water) or using throat lozenges will help.  - If you experience any sharp pain in your neck, abdomen or chest, vomiting of blood, oral temperature over 100 degrees Fahrenheit, light-headedness or dizziness, or any other problems, contact your doctor.    **If unable to reach your doctor, please go to the [facility]**    - Your referring physician will receive a full report of your examination.  - If you do not hear from your doctor's office within two weeks of your biopsy, please call them for your results.    You may be able to see your laboratory results in [employer] between 4 and 7 business days.  In some cases, your physician may not have viewed the results before they are released to [employer].  If you have questions regarding your results contact the physician who ordered the test/exam by phone or via [employer] by choosing \"Ask a Medical Question.\"

## 2025-03-07 ENCOUNTER — TELEMEDICINE (OUTPATIENT)
Dept: INTERNAL MEDICINE CLINIC | Facility: CLINIC | Age: 55
End: 2025-03-07
Payer: COMMERCIAL

## 2025-03-07 ENCOUNTER — NURSE TRIAGE (OUTPATIENT)
Dept: INTERNAL MEDICINE CLINIC | Facility: CLINIC | Age: 55
End: 2025-03-07

## 2025-03-07 DIAGNOSIS — J01.10 ACUTE FRONTAL SINUSITIS, RECURRENCE NOT SPECIFIED: Primary | ICD-10-CM

## 2025-03-07 PROCEDURE — 98013 SYNCH AUDIO-ONLY EST LOW 20: CPT | Performed by: INTERNAL MEDICINE

## 2025-03-07 NOTE — PROGRESS NOTES
STEVEN Shen - could consider fluoroesophagram w/ timed barium tablet swallow and ENT eval if she does not already follow with them.

## 2025-03-07 NOTE — TELEPHONE ENCOUNTER
Action Requested: Summary for Provider     []  Critical Lab, Recommendations Needed  [] Need Additional Advice  []   FYI    []   Need Orders  [] Need Medications Sent to Pharmacy  []  Other     SUMMARY: Nasal congestion and pressure for 2 weeks. Symptoms returned after the EGD yesterday . No fever. Very congested. Over-the-counter remedies didn't help.Negative COVID home test this morning . She requested for a video.         Future Appointments   Date Time Provider Department Center   3/7/2025  1:15 PM Jojo Britton MD [facility] [place]     Reason for call: Sinusitis  Onset: 2-3 weeks         Patient scheduled for a video visit.  Patient advised to complete the E-check in [employer], if active.  Understands to follow the prompts and links to complete the visit.    Patient advised that there may be a co-pay involved with this type of visit.     Patient agreed to proceed, they understand the provider may be calling from a blocked, or unknown phone number on their caller ID and they know to answer the phone.    Best call back:  [phone]            Reason for Disposition   SEVERE sinus pain    Protocols used: Sinus Pain or Congestion-A-[place]

## 2025-03-07 NOTE — PROGRESS NOTES
Subjective:     Patient ID: Rupinder Trujillo is a 54 year old female.    HPI    History/Other:   She is scheduled to have a video visit today due to sinus pressure pain.  According to the patient her symptoms are ongoing for few weeks however progressively got worse now she is having severe sinus pain and pressure jih. she denies any fever or chills.  She denies any sore throat  Review of Systems   Constitutional: Negative.    HENT:  Positive for congestion, sinus pressure and sinus pain.    Eyes: Negative.    Respiratory: Negative.     Cardiovascular: Negative.    Gastrointestinal: Negative.    Genitourinary: Negative.    Musculoskeletal: Negative.    Skin: Negative.    Neurological: Negative.    Hematological: Negative.    Psychiatric/Behavioral: Negative.       Current Outpatient Medications   Medication Sig Dispense Refill    amoxicillin clavulanate 875-125 MG Oral Tab Take 1 tablet by mouth 2 (two) times daily. 14 tablet 0    cephALEXin 500 MG Oral Cap Take 1 capsule (500 mg total) by mouth 3 (three) times daily. 21 capsule 0    pantoprazole 40 MG Oral Tab EC Take 1 tablet (40 mg total) by mouth every morning before breakfast. 90 tablet 0    cyanocobalamin 1000 MCG Oral Tab Take 1 tablet (1,000 mcg total) by mouth daily.      Multiple Vitamin (MULTIVITAMIN ADULT OR) Take by mouth.      Cholecalciferol (VITAMIN D) 50 MCG (2000 UT) Oral Cap Take by mouth.       Allergies:Allergies[1]    Past Medical History:    Arrhythmia    TACHYCARDIA     Deviated nasal bone    Disorder of liver    FATTY LIVER    Endometriosis    High cholesterol    Hodgkin lymphoma (HCC)    1996 tx with ABVD regimen (6 months) at [facility] in [place], [place]    Migraines    Personal history of antineoplastic chemotherapy    Tachycardia    Visual impairment      Past Surgical History:   Procedure Laterality Date    Chemotherapy  1996    hodgkins lymphoma    Laparoscopic cholecystectomy  06/2022    no associated bleeding complications    Dunia  localization wire 1 site right (cpt=19281)      1985/86? done at age 17    Needle biopsy right      Removal of ovary(s)      2011 Left      Family History   Problem Relation Age of Onset    Heart Disorder Mother 82    Other (diverticulitis) Mother         partial colectomy    Prostate Cancer Father 65    Cancer Self 25        lymphoma    Glaucoma Neg     Macular degeneration Neg     Diabetes Neg     Bleeding Disorders Neg     DVT/VTE Neg       Social History:   Social History     Socioeconomic History    Marital status:    Tobacco Use    Smoking status: Every Day     Current packs/day: 0.50     Average packs/day: 0.5 packs/day for 25.0 years (12.5 ttl pk-yrs)     Types: Cigarettes     Passive exposure: Current    Smokeless tobacco: Never    Tobacco comments:     40 year smoking history   Vaping Use    Vaping status: Former   Substance and Sexual Activity    Alcohol use: Yes     Alcohol/week: 2.0 standard drinks of alcohol     Types: 2 Standard drinks or equivalent per week     Comment: maybe one time a month    Drug use: No    Sexual activity: Yes     Partners: Male   Other Topics Concern    Caffeine Concern No    Reaction to local anesthetic No   Social History Narrative    Rupinder is . She has no children. Patient works as an . She lives alone in [place], [place].     Social Drivers of Health     Food Insecurity: No Food Insecurity (9/15/2024)    Food Insecurity     Food Insecurity: Never true   Transportation Needs: No Transportation Needs (9/15/2024)    Transportation Needs     Lack of Transportation: No   Housing Stability: Low Risk  (9/15/2024)    Housing Stability     Housing Instability: No        Objective:   Physical Exam  Vitals and nursing note reviewed.   Constitutional:       Appearance: Normal appearance.   HENT:      Head: Normocephalic and atraumatic.   Cardiovascular:      Rate and Rhythm: Normal rate and regular rhythm.      Pulses: Normal pulses.      Heart sounds: Normal heart  sounds.   Pulmonary:      Breath sounds: Normal breath sounds.   Musculoskeletal:         General: Normal range of motion.      Cervical back: Normal range of motion and neck supple.   Skin:     General: Skin is warm.   Neurological:      Mental Status: She is alert. Mental status is at baseline.         Assessment & Plan:   1. Acute frontal sinusitis, recurrence not specified    Will start antibiotic, advised to take with food, steam can help with congestion, Tylenol alternate with ibuprofen as needed for fever chills or body ache, Tylenol tonight with ibuprofen as needed for fever chills pain    No orders of the defined types were placed in this encounter.      Meds This Visit:  Requested Prescriptions     Signed Prescriptions Disp Refills    amoxicillin clavulanate 875-125 MG Oral Tab 14 tablet 0     Sig: Take 1 tablet by mouth 2 (two) times daily.       Imaging & Referrals:  None            [1]   Allergies  Allergen Reactions    Entex JITTERY    Morphine OTHER (SEE COMMENTS)     Hyper and aggressive.

## 2025-03-11 ENCOUNTER — TELEPHONE (OUTPATIENT)
Dept: INTERNAL MEDICINE CLINIC | Facility: CLINIC | Age: 55
End: 2025-03-11

## 2025-03-11 NOTE — TELEPHONE ENCOUNTER
patient call our office as she did receive your [employer] message on her Ct of the chest. Patient will like for you to explain her test results. She is concern with number 5. What does it mean. Please advise             Send letter and copy of test result.  ONCLUSION:  1. Stable pulmonary nodules and micronodules. A follow-up study can be obtained in 1 year to document a 2-year pattern of stability for the left lower lobe pulmonary nodule.       2. Left adrenal adenoma is unchanged.       3. Hepatic steatosis.     4. Status post cholecystectomy.     5. Sequela of remote granulomatous disease.     6. Lesser incidental findings as above.   Written by Tyrone Benz MD on 3/11/2025 12:50 PM CDT  Seen by patient Rupinder Le Trujillo on 3/11/2025  1:46 PM

## 2025-03-11 NOTE — TELEPHONE ENCOUNTER
Results discussed CT lung sttable  Nodules adenoma    Pt still smoking  smoking cessation adivsed  Fatty liver she lost 14 lbs  Sinusitis   cont augmentin take mucienex DM   Again smoking cessation adivsed  If not better call back

## 2025-03-12 NOTE — TELEPHONE ENCOUNTER
Patient states provider called last night. Recommendations reviewed. No further questions at this time.

## 2025-03-16 ENCOUNTER — HOSPITAL ENCOUNTER (OUTPATIENT)
Age: 55
Discharge: HOME | End: 2025-03-16
Payer: COMMERCIAL

## 2025-03-16 VITALS
HEART RATE: 75 BPM | DIASTOLIC BLOOD PRESSURE: 87 MMHG | RESPIRATION RATE: 18 BRPM | OXYGEN SATURATION: 99 % | TEMPERATURE: 99 F | SYSTOLIC BLOOD PRESSURE: 137 MMHG

## 2025-03-16 DIAGNOSIS — R09.81 SINUS CONGESTION: Primary | ICD-10-CM

## 2025-03-16 PROCEDURE — 99213 OFFICE O/P EST LOW 20 MIN: CPT | Performed by: PHYSICIAN ASSISTANT

## 2025-03-16 RX ORDER — BENZONATATE 100 MG/1
100 CAPSULE ORAL 3 TIMES DAILY PRN
Qty: 21 CAPSULE | Refills: 0 | Status: SHIPPED | OUTPATIENT
Start: 2025-03-16 | End: 2025-03-23

## 2025-03-16 NOTE — ED INITIAL ASSESSMENT (HPI)
Pt dx with sinus infection  3/7 and took augmentin since last Friday. Patient reports worsening cough, hoarse voice. Attempted mucinex with no relief. Tested negative for flu.

## 2025-03-16 NOTE — ED PROVIDER NOTES
Patient Seen in: [facility]      History     Chief Complaint   Patient presents with    Cough     Stated Complaint: Sinus issue    Subjective:   HPI    53 yo female here with complaint of sinus congestion, facial pressure and post nasal drip.  Patient reports the symptoms have been going on for approximately 10 to 14 days.  She did have a telehealth visit 9 days ago and was prescribed Augmentin which she completed 2 days ago.  She denies any new fevers, dizziness or nausea.  She does have a cough and some voice hoarseness.  She has been taking Mucinex for the symptoms.  Home COVID and flu test are negative    Objective:     Past Medical History:    Arrhythmia    TACHYCARDIA     Deviated nasal bone    Disorder of liver    FATTY LIVER    Endometriosis    High cholesterol    Hodgkin lymphoma (HCC)    1996 tx with ABVD regimen (6 months) at [facility] in [place], [place]    Migraines    Personal history of antineoplastic chemotherapy    Tachycardia    Visual impairment              Past Surgical History:   Procedure Laterality Date    Chemotherapy  1996    hodgkins lymphoma    Laparoscopic cholecystectomy  06/2022    no associated bleeding complications    Udnia localization wire 1 site right (cpt=19281)      1985/86? done at age 17    Needle biopsy right      Removal of ovary(s)      2011 Left                Social History     Socioeconomic History    Marital status:    Tobacco Use    Smoking status: Every Day     Current packs/day: 0.50     Average packs/day: 0.5 packs/day for 25.0 years (12.5 ttl pk-yrs)     Types: Cigarettes     Passive exposure: Current    Smokeless tobacco: Never    Tobacco comments:     40 year smoking history   Vaping Use    Vaping status: Former   Substance and Sexual Activity    Alcohol use: Yes     Alcohol/week: 2.0 standard drinks of alcohol     Types: 2 Standard drinks or equivalent per week     Comment: maybe one time a month    Drug use: No    Sexual activity: Yes      Partners: Male   Other Topics Concern    Caffeine Concern No    Reaction to local anesthetic No   Social History Narrative    Rupinder is . She has no children. Patient works as an . She lives alone in [place], [place].     Social Drivers of Health     Food Insecurity: No Food Insecurity (9/15/2024)    Food Insecurity     Food Insecurity: Never true   Transportation Needs: No Transportation Needs (9/15/2024)    Transportation Needs     Lack of Transportation: No   Housing Stability: Low Risk  (9/15/2024)    Housing Stability     Housing Instability: No              Review of Systems    Positive for stated complaint: Sinus issue  Other systems are as noted in HPI.  Constitutional and vital signs reviewed.      All other systems reviewed and negative except as noted above.    Physical Exam     ED Triage Vitals [03/16/25 1025]   /87   Pulse 75   Resp 18   Temp 98.5 °F (36.9 °C)   Temp src Oral   SpO2 99 %   O2 Device None (Room air)       Current Vitals:   Vital Signs  BP: 137/87  Pulse: 75  Resp: 18  Temp: 98.5 °F (36.9 °C)  Temp src: Oral    Oxygen Therapy  SpO2: 99 %  O2 Device: None (Room air)        Physical Exam  Vitals and nursing note reviewed.   Constitutional:       General: She is not in acute distress.  HENT:      Head: Normocephalic and atraumatic.      Right Ear: Tympanic membrane and external ear normal.      Left Ear: Tympanic membrane and external ear normal.      Nose: Nose normal.      Mouth/Throat:      Mouth: Mucous membranes are moist.   Eyes:      Extraocular Movements: Extraocular movements intact.      Pupils: Pupils are equal, round, and reactive to light.   Cardiovascular:      Rate and Rhythm: Normal rate.   Pulmonary:      Effort: Pulmonary effort is normal.      Breath sounds: No wheezing or rhonchi.   Abdominal:      General: Abdomen is flat.   Musculoskeletal:         General: Normal range of motion.      Cervical back: Normal range of motion.   Skin:     General: Skin  is warm.   Neurological:      General: No focal deficit present.      Mental Status: She is alert and oriented to person, place, and time.   Psychiatric:         Mood and Affect: Mood normal.         Behavior: Behavior normal.             ED Course   Labs Reviewed - No data to display     54-year-old female presents for evaluation of sinus congestion, cough, voice hoarseness.  Completed a 10-day course of Augmentin 2 days ago.  She is afebrile and very well-appearing.  There is no hypoxia, no tachycardia    Ddx-viral URI, sinusitis, allergic rhinitis    Discussed that sinusitis treatment gold standard is Augmentin and would not recommend additional course of antibiotics.  She is agreeable to a trial of Allegra, Flonase as I think there may be an allergic rhinitis component to the patient's symptoms. ENT follow-up recommended.           MDM              Medical Decision Making      Disposition and Plan     Clinical Impression:  1. Sinus congestion         Disposition:  Discharge  3/16/2025 11:06 am    Follow-up:  Tyrone Benz  [address] [place] [id number]  [phone]                Medications Prescribed:  Discharge Medication List as of 3/16/2025 11:11 AM        START taking these medications    Details   benzonatate 100 MG Oral Cap Take 1 capsule (100 mg total) by mouth 3 (three) times daily as needed for cough., Normal, Disp-21 capsule, R-0                 Supplementary Documentation:

## 2025-03-17 ENCOUNTER — OFFICE VISIT (OUTPATIENT)
Dept: INTERNAL MEDICINE CLINIC | Facility: CLINIC | Age: 55
End: 2025-03-17
Payer: COMMERCIAL

## 2025-03-17 ENCOUNTER — TELEPHONE (OUTPATIENT)
Dept: INTERNAL MEDICINE CLINIC | Facility: CLINIC | Age: 55
End: 2025-03-17

## 2025-03-17 VITALS
HEIGHT: 67 IN | DIASTOLIC BLOOD PRESSURE: 77 MMHG | HEART RATE: 81 BPM | TEMPERATURE: 97 F | WEIGHT: 185 LBS | SYSTOLIC BLOOD PRESSURE: 133 MMHG | RESPIRATION RATE: 16 BRPM | BODY MASS INDEX: 29.03 KG/M2

## 2025-03-17 DIAGNOSIS — R04.2 COUGHING UP BLOOD: ICD-10-CM

## 2025-03-17 DIAGNOSIS — J01.01 ACUTE RECURRENT MAXILLARY SINUSITIS: Primary | ICD-10-CM

## 2025-03-17 DIAGNOSIS — H66.91 OTITIS OF RIGHT EAR: ICD-10-CM

## 2025-03-17 PROBLEM — H66.90 OTITIS: Status: ACTIVE | Noted: 2025-03-17

## 2025-03-17 PROCEDURE — 99215 OFFICE O/P EST HI 40 MIN: CPT | Performed by: NURSE PRACTITIONER

## 2025-03-17 PROCEDURE — 3075F SYST BP GE 130 - 139MM HG: CPT | Performed by: NURSE PRACTITIONER

## 2025-03-17 PROCEDURE — 3008F BODY MASS INDEX DOCD: CPT | Performed by: NURSE PRACTITIONER

## 2025-03-17 PROCEDURE — 3078F DIAST BP <80 MM HG: CPT | Performed by: NURSE PRACTITIONER

## 2025-03-17 RX ORDER — DOXYCYCLINE 100 MG/1
100 CAPSULE ORAL 2 TIMES DAILY
Qty: 14 CAPSULE | Refills: 0 | Status: SHIPPED | OUTPATIENT
Start: 2025-03-17 | End: 2025-03-17

## 2025-03-17 NOTE — PATIENT INSTRUCTIONS
Drink Water with Lemon    Allergy Treatment Plan  -Take otc allergy medications as directed (over the counter, generic Claritin, Zyrtec, Xyzal or Allegra)     -use of steroidal nasal spray as advised (Flonase, Rhinocort)-this is now available as a generic, over the counter spray if your insurance doesn't cover it      used every morning faithfully each morning during the allergy season is the BEST treatment; they are very safe for use over a period of 6-7 months (April - October)     -over the counter allergy eye drops-Zaditor (ketotifen) 1 drop twice a day works very well for eye symptoms     -keep windows closed at night     -do not allow pets to sleep in room     -use allergen covers on pillows, mattress and box spring to reduce exposure to dust mites     -use of Simply Saline Mist     -shower after outdoor activities, especially when allergy counts are high     -Use of allergen filters for furnace; consider air purifier if allergies are significant at night     -Call  If symptoms do not improve, worsen or with other questions or concerns

## 2025-03-17 NOTE — TELEPHONE ENCOUNTER
Verified name and .    Patient was seen in the urgent care yesterday 3/16/25- states that she has been coughing up some bloody mucus- same as when she was seen in the urgent care.    She denies any difficulty breathing or chest pain at this time.    Urgent care follow up scheduled:  Future Appointments   Date Time Provider Department Center   3/17/2025  4:20 PM Paz Catherine APRN [facility]   2025 12:10 PM Yanelis Cooper DPM [facility]   [facility]

## 2025-03-17 NOTE — PROGRESS NOTES
HPI:    Patient ID: Rupinder Trujillo is a 54 year old female.  No Children    HPI UC follow up/ Right Ear Pain.  54 year old female who was treated for sinusitis.  I am meeting her for the first time.  HX of Hodgkins Lymphoma- Chemotherapy in 1996.  It started in January with sinus congestion.  She was tested for SARS and it was negative.  2-- ED with severe sinus pain and pressure, neck pain and intermittent dizziness.  She was given Fluticasone and doxycycline.  3- Went to a UC-She was given Augmentin on a telemedicine visit 9 days prior but continues with sinus congestion, facial pressure and post nasal drip.    Today  Continues with sinus pain, pressure, nasal congestion,change in voice and right ear pain. Home COVID-19 and Flu test are negative.  Patient was coughing up blood.     Smokes 1/2 pack per day    Immunization History   Administered Date(s) Administered    Covid-19 Vaccine Moderna 100 mcg/0.5 ml 01/15/2022    TDAP 07/01/2011, 01/31/2024   Deferred Date(s) Deferred    Zoster Vaccine Recombinant Adjuvanted (Shingrix) 07/25/2023       Past Medical History:    Arrhythmia    TACHYCARDIA     Deviated nasal bone    Disorder of liver    FATTY LIVER    Endometriosis    High cholesterol    Hodgkin lymphoma (HCC)    1996 tx with ABVD regimen (6 months) at [facility] in [place], [place]    Migraines    Personal history of antineoplastic chemotherapy    Tachycardia    Visual impairment      Past Surgical History:   Procedure Laterality Date    Chemotherapy  1996    hodgkins lymphoma    Laparoscopic cholecystectomy  06/2022    no associated bleeding complications    Dunia localization wire 1 site right (cpt=19281)      1985/86? done at age 17    Needle biopsy right      Removal of ovary(s)      2011 Left      Social History     Socioeconomic History    Marital status:    Tobacco Use    Smoking status: Every Day     Current packs/day: 0.50     Average packs/day: 0.5 packs/day for 25.0 years (12.5  ttl pk-yrs)     Types: Cigarettes     Passive exposure: Current    Smokeless tobacco: Never    Tobacco comments:     40 year smoking history   Vaping Use    Vaping status: Former   Substance and Sexual Activity    Alcohol use: Yes     Alcohol/week: 2.0 standard drinks of alcohol     Types: 2 Standard drinks or equivalent per week     Comment: maybe one time a month    Drug use: No    Sexual activity: Yes     Partners: Male   Other Topics Concern    Caffeine Concern No    Reaction to local anesthetic No          Review of Systems   Constitutional:  Negative for chills, fatigue and fever.   HENT:  Positive for congestion, sinus pressure and sinus pain. Negative for ear discharge, ear pain, facial swelling, hearing loss, postnasal drip, sore throat and trouble swallowing.    Eyes:  Negative for pain, discharge, redness and visual disturbance.   Respiratory:  Positive for cough. Negative for chest tightness, shortness of breath and wheezing.    Cardiovascular:  Negative for chest pain, palpitations and leg swelling.   Gastrointestinal:  Negative for abdominal distention, abdominal pain, constipation, diarrhea, nausea and vomiting.   Endocrine: Negative for cold intolerance, heat intolerance, polydipsia, polyphagia and polyuria.   Genitourinary:  Negative for difficulty urinating, dysuria, pelvic pain and vaginal bleeding.   Musculoskeletal:  Negative for back pain, gait problem, neck pain and neck stiffness.   Skin:  Negative for color change and rash.   Neurological:  Negative for dizziness, seizures, weakness and headaches.   Psychiatric/Behavioral:  Negative for agitation and sleep disturbance. The patient is not nervous/anxious.               Current Outpatient Medications   Medication Sig Dispense Refill    amoxicillin clavulanate 875-125 MG Oral Tab Take 1 tablet by mouth 2 (two) times daily for 10 days. 20 tablet 0    benzonatate 100 MG Oral Cap Take 1 capsule (100 mg total) by mouth 3 (three) times daily as  needed for cough. (Patient not taking: Reported on 3/17/2025) 21 capsule 0    cephALEXin 500 MG Oral Cap Take 1 capsule (500 mg total) by mouth 3 (three) times daily. (Patient not taking: Reported on 3/17/2025) 21 capsule 0    pantoprazole 40 MG Oral Tab EC Take 1 tablet (40 mg total) by mouth every morning before breakfast. (Patient not taking: Reported on 3/17/2025) 90 tablet 0    cyanocobalamin 1000 MCG Oral Tab Take 1 tablet (1,000 mcg total) by mouth daily. (Patient not taking: Reported on 3/17/2025)      Multiple Vitamin (MULTIVITAMIN ADULT OR) Take by mouth. (Patient not taking: Reported on 3/17/2025)      Cholecalciferol (VITAMIN D) 50 MCG (2000 UT) Oral Cap Take by mouth. (Patient not taking: Reported on 3/17/2025)       Allergies:Allergies[1]   PHYSICAL EXAM:   Physical Exam  Constitutional:       Appearance: Normal appearance. She is well-developed.   HENT:      Head: Normocephalic.      Right Ear: Tympanic membrane normal.      Left Ear: Tympanic membrane normal.      Ears:      Comments: Acute otitis media of right ear.  Right ear with bulging, opaque, erythematous, and a yellowish hue.  The TM is immobile.             Nose: Nose normal.      Mouth/Throat:      Mouth: Mucous membranes are moist.      Pharynx: No oropharyngeal exudate or posterior oropharyngeal erythema.   Eyes:      General:         Right eye: No discharge.         Left eye: No discharge.      Pupils: Pupils are equal, round, and reactive to light.   Cardiovascular:      Rate and Rhythm: Normal rate and regular rhythm.      Heart sounds: Normal heart sounds. No murmur heard.     No friction rub. No gallop.   Pulmonary:      Effort: Pulmonary effort is normal. No respiratory distress.      Breath sounds: Normal breath sounds. No wheezing, rhonchi or rales.   Abdominal:      General: Bowel sounds are normal. There is no distension.      Palpations: Abdomen is soft. There is no mass.      Tenderness: There is no abdominal tenderness. There  is no right CVA tenderness, left CVA tenderness or guarding.   Musculoskeletal:         General: No tenderness.      Cervical back: Normal range of motion and neck supple. No tenderness.      Right lower leg: No edema.      Left lower leg: No edema.   Lymphadenopathy:      Cervical: No cervical adenopathy.   Skin:     General: Skin is warm and dry.      Findings: No rash.   Neurological:      Mental Status: She is alert and oriented to person, place, and time.      Coordination: Coordination normal.      Gait: Gait normal.   Psychiatric:         Mood and Affect: Mood normal.         Behavior: Behavior normal.         Thought Content: Thought content normal.         Judgment: Judgment normal.       /77 (BP Location: Left arm, Patient Position: Sitting, Cuff Size: adult)   Pulse 81   Temp 97 °F (36.1 °C) (Temporal)   Resp 16   Ht 5' 7\" (1.702 m)   Wt 185 lb (83.9 kg)   LMP 01/22/2017 (LMP Unknown)   BMI 28.98 kg/m²   Wt Readings from Last 2 Encounters:   03/17/25 185 lb (83.9 kg)   03/03/25 184 lb (83.5 kg)     Body mass index is 28.98 kg/m².(2)  Lab Results   Component Value Date    WBC 11.0 03/03/2025    RBC 5.03 03/03/2025    HGB 15.6 03/03/2025    HCT 44.5 03/03/2025    MCV 88.5 03/03/2025    MCH 31.0 03/03/2025    MCHC 35.1 03/03/2025    RDW 12.0 03/03/2025    .0 03/03/2025    MPV 7.4 05/14/2018      Lab Results   Component Value Date    GLU 95 02/18/2025    BUN 11 02/18/2025    BUNCREA 10.2 02/18/2025    CREATSERUM 1.08 (H) 02/18/2025    ANIONGAP 8 02/18/2025    GFR 54 (L) 06/29/2016    GFRNAA 56 (L) 07/11/2022    GFRAA 65 07/11/2022    CA 9.7 02/18/2025    OSMOCALC 287 02/18/2025    ALKPHO 76 02/18/2025    AST 33 02/18/2025    ALT 42 02/18/2025    ALKPHOS 60 09/19/2016    BILT 0.5 02/18/2025    TP 7.7 02/18/2025    ALB 4.8 02/18/2025    GLOBULIN 2.9 02/18/2025    AGRATIO 1.2 09/19/2016     02/18/2025    K 4.4 02/18/2025     02/18/2025    CO2 27.0 02/18/2025      Lab Results    Component Value Date     01/31/2024    A1C 5.5 01/31/2024      Lab Results   Component Value Date    CHOLEST 206 (H) 02/18/2025    TRIG 351 (H) 02/18/2025    HDL 34 (L) 02/18/2025     (H) 02/18/2025    VLDL 62 (H) 02/18/2025    NONHDLC 172 (H) 02/18/2025    CALCNONHDL 158 (H) 09/19/2016      Lab Results   Component Value Date    T4F 1.0 01/31/2024    TSH 1.182 02/18/2025                ASSESSMENT/PLAN:     Problem List Items Addressed This Visit       Acute recurrent maxillary sinusitis - Primary     Patient has had upper respiratory symptoms for the last several months and not improving with over-the-counter medications and course of Augmentin     Plan   Will re-treat with Augmentin twice daily for 10 days with meals.    Flonase 2 sprays in each nostril once daily.    Increase fluid intake to stay well-hydrated.    Continue with Benzonatate cough perles  Contact the office if symptoms worsen or do not improve.          Relevant Medications    amoxicillin clavulanate 875-125 MG Oral Tab    Coughing up blood     Patient stated she was coughing up blood    Plan  Recent CT of chest  Impression   CONCLUSION:  1. Stable pulmonary nodules and micronodules. A follow-up study can be obtained in 1 year to document a 2-year pattern of stability for the left lower lobe pulmonary nodule.       2. Left adrenal adenoma is unchanged.       3. Hepatic steatosis.     4. Status post cholecystectomy.       Follow up with pulmonologist         Otitis     Acute otitis media of right ear.  Right  ear with bulging, opaque, erythematous, and a yellowish hue.       Plan  amoxicillin clavulanate 875-125 MG Oral Tab Take 1 tablet by mouth 2 (two) times daily for 10 days. 20 tablet   Apply warm compresses to infected ear                No orders of the defined types were placed in this encounter.      Meds This Visit:  Requested Prescriptions     Signed Prescriptions Disp Refills    amoxicillin clavulanate 875-125 MG Oral Tab  20 tablet 0     Sig: Take 1 tablet by mouth 2 (two) times daily for 10 days.       Imaging & Referrals:  None         SHERI Hensley          [1]   Allergies  Allergen Reactions    Entex JITTERY    Morphine OTHER (SEE COMMENTS)     Hyper and aggressive.

## 2025-03-18 NOTE — ASSESSMENT & PLAN NOTE
Patient has had upper respiratory symptoms for the last several months and not improving with over-the-counter medications and course of Augmentin     Plan   Will re-treat with Augmentin twice daily for 10 days with meals.    Flonase 2 sprays in each nostril once daily.    Increase fluid intake to stay well-hydrated.    Continue with Benzonatate cough perles  Contact the office if symptoms worsen or do not improve.

## 2025-03-18 NOTE — ASSESSMENT & PLAN NOTE
Patient stated she was coughing up blood    Plan  Recent CT of chest  Impression   CONCLUSION:  1. Stable pulmonary nodules and micronodules. A follow-up study can be obtained in 1 year to document a 2-year pattern of stability for the left lower lobe pulmonary nodule.       2. Left adrenal adenoma is unchanged.       3. Hepatic steatosis.     4. Status post cholecystectomy.       Follow up with pulmonologist

## 2025-03-18 NOTE — ASSESSMENT & PLAN NOTE
Acute otitis media of right ear.  Right  ear with bulging, opaque, erythematous, and a yellowish hue.       Plan  amoxicillin clavulanate 875-125 MG Oral Tab Take 1 tablet by mouth 2 (two) times daily for 10 days. 20 tablet   Apply warm compresses to infected ear

## 2025-04-15 ENCOUNTER — HOSPITAL ENCOUNTER (OUTPATIENT)
Dept: MAMMOGRAPHY | Facility: HOSPITAL | Age: 55
Discharge: HOME | End: 2025-04-15
Attending: NURSE PRACTITIONER
Payer: COMMERCIAL

## 2025-04-15 DIAGNOSIS — Z12.31 VISIT FOR SCREENING MAMMOGRAM: ICD-10-CM

## 2025-04-15 PROCEDURE — 77063 BREAST TOMOSYNTHESIS BI: CPT | Performed by: NURSE PRACTITIONER

## 2025-04-15 PROCEDURE — 77067 SCR MAMMO BI INCL CAD: CPT | Performed by: NURSE PRACTITIONER

## 2025-05-03 ENCOUNTER — HOSPITAL ENCOUNTER (EMERGENCY)
Facility: HOSPITAL | Age: 55
Discharge: HOME | End: 2025-05-03
Attending: EMERGENCY MEDICINE
Payer: COMMERCIAL

## 2025-05-03 ENCOUNTER — APPOINTMENT (OUTPATIENT)
Dept: GENERAL RADIOLOGY | Facility: HOSPITAL | Age: 55
End: 2025-05-03
Payer: COMMERCIAL

## 2025-05-03 VITALS
SYSTOLIC BLOOD PRESSURE: 153 MMHG | TEMPERATURE: 98 F | DIASTOLIC BLOOD PRESSURE: 89 MMHG | HEIGHT: 66 IN | BODY MASS INDEX: 30.22 KG/M2 | RESPIRATION RATE: 19 BRPM | HEART RATE: 91 BPM | WEIGHT: 188 LBS | OXYGEN SATURATION: 98 %

## 2025-05-03 DIAGNOSIS — J40 BRONCHITIS: Primary | ICD-10-CM

## 2025-05-03 LAB
ALBUMIN SERPL-MCNC: 4.3 G/DL (ref 3.2–4.8)
ALBUMIN/GLOB SERPL: 1.5 {RATIO} (ref 1–2)
ALP LIVER SERPL-CCNC: 62 U/L (ref 41–108)
ALT SERPL-CCNC: 30 U/L (ref 10–49)
ANION GAP SERPL CALC-SCNC: 11 MMOL/L (ref 0–18)
AST SERPL-CCNC: 37 U/L (ref ?–34)
BASOPHILS # BLD AUTO: 0.08 X10(3) UL (ref 0–0.2)
BASOPHILS NFR BLD AUTO: 0.7 %
BILIRUB SERPL-MCNC: 0.3 MG/DL (ref 0.3–1.2)
BUN BLD-MCNC: 10 MG/DL (ref 9–23)
BUN/CREAT SERPL: 9 (ref 10–20)
CALCIUM BLD-MCNC: 9.1 MG/DL (ref 8.7–10.4)
CHLORIDE SERPL-SCNC: 108 MMOL/L (ref 98–112)
CO2 SERPL-SCNC: 22 MMOL/L (ref 21–32)
CREAT BLD-MCNC: 1.11 MG/DL (ref 0.55–1.02)
D DIMER PPP FEU-MCNC: <0.27 UG/ML FEU (ref ?–0.53)
DEPRECATED RDW RBC AUTO: 41.8 FL (ref 35.1–46.3)
EGFRCR SERPLBLD CKD-EPI 2021: 59 ML/MIN/1.73M2 (ref 60–?)
EOSINOPHIL # BLD AUTO: 0.13 X10(3) UL (ref 0–0.7)
EOSINOPHIL NFR BLD AUTO: 1.2 %
ERYTHROCYTE [DISTWIDTH] IN BLOOD BY AUTOMATED COUNT: 12.1 % (ref 11–15)
GLOBULIN PLAS-MCNC: 2.9 G/DL (ref 2–3.5)
GLUCOSE BLD-MCNC: 103 MG/DL (ref 70–99)
HCT VFR BLD AUTO: 44.6 % (ref 35–48)
HGB BLD-MCNC: 14.7 G/DL (ref 12–16)
IMM GRANULOCYTES # BLD AUTO: 0.03 X10(3) UL (ref 0–1)
IMM GRANULOCYTES NFR BLD: 0.3 %
LYMPHOCYTES # BLD AUTO: 3.45 X10(3) UL (ref 1–4)
LYMPHOCYTES NFR BLD AUTO: 32 %
MCH RBC QN AUTO: 30.6 PG (ref 26–34)
MCHC RBC AUTO-ENTMCNC: 33 G/DL (ref 31–37)
MCV RBC AUTO: 92.9 FL (ref 80–100)
MONOCYTES # BLD AUTO: 0.67 X10(3) UL (ref 0.1–1)
MONOCYTES NFR BLD AUTO: 6.2 %
NEUTROPHILS # BLD AUTO: 6.43 X10 (3) UL (ref 1.5–7.7)
NEUTROPHILS # BLD AUTO: 6.43 X10(3) UL (ref 1.5–7.7)
NEUTROPHILS NFR BLD AUTO: 59.6 %
OSMOLALITY SERPL CALC.SUM OF ELEC: 291 MOSM/KG (ref 275–295)
PLATELET # BLD AUTO: 314 10(3)UL (ref 150–450)
POTASSIUM SERPL-SCNC: 4.7 MMOL/L (ref 3.5–5.1)
PROT SERPL-MCNC: 7.2 G/DL (ref 5.7–8.2)
RBC # BLD AUTO: 4.8 X10(6)UL (ref 3.8–5.3)
SODIUM SERPL-SCNC: 141 MMOL/L (ref 136–145)
TROPONIN I SERPL HS-MCNC: <3 NG/L (ref ?–34)
WBC # BLD AUTO: 10.8 X10(3) UL (ref 4–11)

## 2025-05-03 PROCEDURE — 99285 EMERGENCY DEPT VISIT HI MDM: CPT

## 2025-05-03 PROCEDURE — 80053 COMPREHEN METABOLIC PANEL: CPT | Performed by: EMERGENCY MEDICINE

## 2025-05-03 PROCEDURE — 36415 COLL VENOUS BLD VENIPUNCTURE: CPT

## 2025-05-03 PROCEDURE — 85379 FIBRIN DEGRADATION QUANT: CPT | Performed by: EMERGENCY MEDICINE

## 2025-05-03 PROCEDURE — 94640 AIRWAY INHALATION TREATMENT: CPT

## 2025-05-03 PROCEDURE — 85025 COMPLETE CBC W/AUTO DIFF WBC: CPT | Performed by: EMERGENCY MEDICINE

## 2025-05-03 PROCEDURE — 71045 X-RAY EXAM CHEST 1 VIEW: CPT | Performed by: EMERGENCY MEDICINE

## 2025-05-03 PROCEDURE — 93010 ELECTROCARDIOGRAM REPORT: CPT

## 2025-05-03 PROCEDURE — 99284 EMERGENCY DEPT VISIT MOD MDM: CPT

## 2025-05-03 PROCEDURE — 84484 ASSAY OF TROPONIN QUANT: CPT | Performed by: EMERGENCY MEDICINE

## 2025-05-03 PROCEDURE — 93005 ELECTROCARDIOGRAM TRACING: CPT

## 2025-05-03 RX ORDER — IPRATROPIUM BROMIDE AND ALBUTEROL SULFATE 2.5; .5 MG/3ML; MG/3ML
3 SOLUTION RESPIRATORY (INHALATION) ONCE
Status: COMPLETED | OUTPATIENT
Start: 2025-05-03 | End: 2025-05-03

## 2025-05-03 RX ORDER — ALBUTEROL SULFATE 90 UG/1
2 INHALANT RESPIRATORY (INHALATION) EVERY 4 HOURS PRN
Qty: 1 EACH | Refills: 0 | Status: SHIPPED | OUTPATIENT
Start: 2025-05-03 | End: 2025-06-02

## 2025-05-03 NOTE — ED INITIAL ASSESSMENT (HPI)
Pt presents for c/o SOB, fatigue, difficulty sleeping for the last few days. Pt reports cough for the last few months. Pt reports coughing up small pools of bright red blood five times today. Pt denies fevers

## 2025-05-03 NOTE — ED PROVIDER NOTES
Patient Seen in: [facility] Emergency Department      History     Chief Complaint   Patient presents with    Chest Pain Angina    Cough/URI     Stated Complaint: Cough, chest pain    Subjective:   HPI    54-year-old female with history of hypercholesterolemia and Hodgkin's lymphoma in remission since 1996 presents with complaints of cough, anterior chest pain, and hemoptysis.  The patient reports cough and anterior chest pain over the past 3 days.  Today she began noting some blood-tinged sputum.  No known fevers.  She denies any leg pain or swelling.  She denies recent travel or other immobilization.  She denies history of DVT or PE.  No known prior cardiac issues.  History of Present Illness               Objective:     Past Medical History:    Arrhythmia    TACHYCARDIA     Deviated nasal bone    Disorder of liver    FATTY LIVER    Endometriosis    High cholesterol    Hodgkin lymphoma (HCC)    1996 tx with ABVD regimen (6 months) at [facility] in [place], [place]    Migraines    Personal history of antineoplastic chemotherapy    Tachycardia    Visual impairment              Past Surgical History:   Procedure Laterality Date    Chemotherapy  1996    hodgkins lymphoma    Laparoscopic cholecystectomy  06/2022    no associated bleeding complications    Dunia localization wire 1 site right (cpt=19281)      1985/86? done at age 17    Needle biopsy right      Removal of ovary(s)      2011 Left                Social History     Socioeconomic History    Marital status:    Tobacco Use    Smoking status: Every Day     Current packs/day: 0.50     Average packs/day: 0.5 packs/day for 25.0 years (12.5 ttl pk-yrs)     Types: Cigarettes     Passive exposure: Current    Smokeless tobacco: Never    Tobacco comments:     40 year smoking history   Vaping Use    Vaping status: Former   Substance and Sexual Activity    Alcohol use: Yes     Alcohol/week: 2.0 standard drinks of alcohol     Types: 2 Standard drinks or  equivalent per week     Comment: maybe one time a month    Drug use: No    Sexual activity: Yes     Partners: Male   Other Topics Concern    Caffeine Concern No    Reaction to local anesthetic No   Social History Narrative    Rupinder is . She has no children. Patient works as an . She lives alone in [place], [place].     Social Drivers of Health     Food Insecurity: No Food Insecurity (9/15/2024)    Food Insecurity     Food Insecurity: Never true   Transportation Needs: No Transportation Needs (9/15/2024)    Transportation Needs     Lack of Transportation: No   Housing Stability: Low Risk  (9/15/2024)    Housing Stability     Housing Instability: No                                Physical Exam     ED Triage Vitals [05/03/25 1541]   /89   Pulse 91   Resp 19   Temp 98.1 °F (36.7 °C)   Temp src Oral   SpO2 98 %   O2 Device None (Room air)       Current Vitals:   Vital Signs  BP: 153/89  Pulse: 91  Resp: 19  Temp: 98.1 °F (36.7 °C)  Temp src: Oral    Oxygen Therapy  SpO2: 98 %  O2 Device: None (Room air)        Physical Exam    General Appearance: alert, no distress  Eyes: pupils equal and round no pallor or injection  ENT, Mouth: mucous membranes moist  Respiratory: there are no retractions, lungs are clear to auscultation  Cardiovascular: regular rate and rhythm  Gastrointestinal:  abdomen is soft and non tender, no masses, bowel sounds normal  Neurological: Speech normal.  Moving extremities x 4.  Skin: warm and dry, no rashes.  Musculoskeletal: neck is supple non tender        Extremities are symmetrical, full range of motion no leg edema or tenderness noted.  Psychiatric: patient is oriented X 3, there is no agitation    DIFFERENTIAL DIAGNOSIS: After history and physical exam differential diagnosis was considered for bronchitis, pneumonia, pulmonary embolism, or other      Physical Exam                ED Course     Labs Reviewed   COMP METABOLIC PANEL (14) - Abnormal; Notable for the following  components:       Result Value    Glucose 103 (*)     Creatinine 1.11 (*)     BUN/CREA Ratio 9.0 (*)     eGFR-Cr 59 (*)     AST 37 (*)     All other components within normal limits   TROPONIN I HIGH SENSITIVITY - Normal   D-DIMER - Normal   CBC WITH DIFFERENTIAL WITH PLATELET   RAINBOW DRAW BLUE     EKG    Rate, intervals and axes as noted on EKG Report.  Rate: 87  Rhythm: Sinus Rhythm  Axis: Normal  Reading: Normal EKG              Results                         MDM      I reveiewed the chest x-ray and agree with the radiologist report that showed no infiltrate or acute changes.  Lab and EKG results noted.  After x-ray resulted the patient was given a nebulizer treatment and reports some improvement in symptoms.  Suspect bronchitis.  Will discharge home with albuterol MDI and recommendations to follow-up with her primary physician.  She is advised to return if worsening symptoms develop.        Medical Decision Making      Disposition and Plan     Clinical Impression:  1. Bronchitis         Disposition:  Discharge  5/3/2025  6:15 pm    Follow-up:  Tyrone Benz  [address] [place] [id number]  [phone]    Follow up            Medications Prescribed:  Current Discharge Medication List        START taking these medications    Details   albuterol 108 (90 Base) MCG/ACT Inhalation Aero Soln Inhale 2 puffs into the lungs every 4 (four) hours as needed for Wheezing.  Qty: 1 each, Refills: 0    Comments: With spacer             Supplementary Documentation:

## 2025-05-03 NOTE — DISCHARGE INSTRUCTIONS
Use inhaler up to every 4 hours as needed for cough or wheezing.  Follow-up with your primary physician for reevaluation.  Return to the emergency department if increased difficulty breathing, chest pain, or other new symptoms develop.   133

## 2025-05-04 LAB
ATRIAL RATE: 87 BPM
P AXIS: 71 DEGREES
P-R INTERVAL: 140 MS
Q-T INTERVAL: 376 MS
QRS DURATION: 82 MS
QTC CALCULATION (BEZET): 452 MS
R AXIS: 77 DEGREES
T AXIS: 63 DEGREES
VENTRICULAR RATE: 87 BPM

## 2025-05-05 ENCOUNTER — LAB ENCOUNTER (OUTPATIENT)
Dept: LAB | Age: 55
End: 2025-05-05
Attending: NURSE PRACTITIONER
Payer: COMMERCIAL

## 2025-05-05 ENCOUNTER — OFFICE VISIT (OUTPATIENT)
Dept: INTERNAL MEDICINE CLINIC | Facility: CLINIC | Age: 55
End: 2025-05-05
Payer: COMMERCIAL

## 2025-05-05 VITALS
HEART RATE: 80 BPM | BODY MASS INDEX: 30.02 KG/M2 | TEMPERATURE: 98 F | DIASTOLIC BLOOD PRESSURE: 80 MMHG | SYSTOLIC BLOOD PRESSURE: 116 MMHG | WEIGHT: 189 LBS | OXYGEN SATURATION: 96 % | HEIGHT: 66.5 IN

## 2025-05-05 DIAGNOSIS — Z09 HOSPITAL DISCHARGE FOLLOW-UP: Primary | ICD-10-CM

## 2025-05-05 DIAGNOSIS — R53.83 FATIGUE, UNSPECIFIED TYPE: ICD-10-CM

## 2025-05-05 DIAGNOSIS — E53.8 VITAMIN B12 DEFICIENCY: ICD-10-CM

## 2025-05-05 DIAGNOSIS — J40 BRONCHITIS: ICD-10-CM

## 2025-05-05 DIAGNOSIS — R04.2 HEMOPTYSIS: ICD-10-CM

## 2025-05-05 LAB — VIT B12 SERPL-MCNC: 376 PG/ML (ref 211–911)

## 2025-05-05 PROCEDURE — 86480 TB TEST CELL IMMUN MEASURE: CPT

## 2025-05-05 PROCEDURE — 36415 COLL VENOUS BLD VENIPUNCTURE: CPT

## 2025-05-05 PROCEDURE — 3008F BODY MASS INDEX DOCD: CPT | Performed by: NURSE PRACTITIONER

## 2025-05-05 PROCEDURE — 82607 VITAMIN B-12: CPT

## 2025-05-05 PROCEDURE — 3079F DIAST BP 80-89 MM HG: CPT | Performed by: NURSE PRACTITIONER

## 2025-05-05 PROCEDURE — 99214 OFFICE O/P EST MOD 30 MIN: CPT | Performed by: NURSE PRACTITIONER

## 2025-05-05 PROCEDURE — 3074F SYST BP LT 130 MM HG: CPT | Performed by: NURSE PRACTITIONER

## 2025-05-05 RX ORDER — METHYLPREDNISOLONE 4 MG/1
TABLET ORAL
Qty: 21 TABLET | Refills: 0 | Status: SHIPPED | OUTPATIENT
Start: 2025-05-05

## 2025-05-05 NOTE — PROGRESS NOTES
Rupinder Trujillo is a 54 year old female.  Chief Complaint   Patient presents with    ER F/U     ER f/u Bronchitis      HPI:   Patient presents for follow up.  She went to the emergency room on 5/3 due to having a cough, chest pain and hemoptysis.    EKG showed normal sinus rhythm with a heart rate of 87  CBC hemoglobin 14.7  D-dimer negative  CMP GFR 59  Troponin negative  Chest x-ray showed no focal opacity, pleural effusion or pneumothorax.    She was given a breathing treatment in the emergency room and reported improvement of symptoms.  It was suspected that she had bronchitis and she was discharged home with albuterol inhaler.    She is taking the albuterol inhaler as needed.      She states she has been having a cough for awhile. The episodes of hemoptysis started on Saturday. She continues to have hemoptysis.     She is also having fatigue. She would like to have her B12 level checked.   Current Medications[1]   Past Medical History[2]   Past Surgical History[3]   Social History:  Short Social Hx on File[4]   Family History[5]   Allergies[6]     REVIEW OF SYSTEMS:     Review of Systems   Constitutional:  Positive for fatigue. Negative for fever.   HENT:  Positive for congestion and postnasal drip. Negative for ear pain, rhinorrhea and sore throat.    Respiratory:  Positive for cough, shortness of breath (with exertion) and wheezing.    Cardiovascular:  Negative for chest pain.   Gastrointestinal: Negative.    Genitourinary: Negative.    Musculoskeletal: Negative.    Skin: Negative.    Neurological: Negative.    Psychiatric/Behavioral: Negative.        Wt Readings from Last 5 Encounters:   05/05/25 189 lb (85.7 kg)   05/03/25 188 lb (85.3 kg)   03/17/25 185 lb (83.9 kg)   03/03/25 184 lb (83.5 kg)   02/26/25 185 lb (83.9 kg)     Body mass index is 30.05 kg/m².      EXAM:   /80 (BP Location: Right arm, Patient Position: Sitting, Cuff Size: adult)   Pulse 80   Temp 98 °F (36.7 °C) (Temporal)   Ht  5' 6.5\" (1.689 m)   Wt 189 lb (85.7 kg)   LMP 01/22/2017 (LMP Unknown)   SpO2 96%   BMI 30.05 kg/m²     Physical Exam  Vitals reviewed.   Constitutional:       Appearance: Normal appearance.   HENT:      Head: Normocephalic.   Cardiovascular:      Rate and Rhythm: Normal rate and regular rhythm.      Pulses: Normal pulses.   Pulmonary:      Breath sounds: Normal breath sounds. No wheezing.   Musculoskeletal:         General: No swelling. Normal range of motion.   Skin:     General: Skin is warm and dry.   Neurological:      Mental Status: She is alert and oriented to person, place, and time.   Psychiatric:         Mood and Affect: Mood normal.         Behavior: Behavior normal.            ASSESSMENT AND PLAN:   1. Hospital discharge follow-up  - hospital notes, imaging and labs reviewed     2. Hemoptysis  - follow up with pulmonary as scheduled   - CT CHEST (CPT=71250); Future  - Quantiferon TB Plus; Future    3. Bronchitis  - methylPREDNISolone (MEDROL) 4 MG Oral Tablet Therapy Pack; As directed.  Dispense: 21 tablet; Refill: 0    4. Vitamin B12 deficiency  - Vitamin B12 [E]; Future    5. Fatigue, unspecified type  - check vitamin B12 level   - recent CBC WNL  - TSH WNL on 2/18/2025      The patient indicates understanding of these issues and agrees to the plan.  Return for if symptoms do not resolve.         [1]   Current Outpatient Medications   Medication Sig Dispense Refill    methylPREDNISolone (MEDROL) 4 MG Oral Tablet Therapy Pack As directed. 21 tablet 0    albuterol 108 (90 Base) MCG/ACT Inhalation Aero Soln Inhale 2 puffs into the lungs every 4 (four) hours as needed for Wheezing. 1 each 0   [2]   Past Medical History:   Arrhythmia    TACHYCARDIA     Deviated nasal bone    Disorder of liver    FATTY LIVER    Endometriosis    High cholesterol    Hodgkin lymphoma (HCC)    1996 tx with ABVD regimen (6 months) at [facility] in [place], [place]    Migraines    Personal history of antineoplastic chemotherapy     Tachycardia    Visual impairment   [3]   Past Surgical History:  Procedure Laterality Date    Chemotherapy  1996    hodgkins lymphoma    Laparoscopic cholecystectomy  06/2022    no associated bleeding complications    Dunia localization wire 1 site right (cpt=19281)      1985/86? done at age 17    Needle biopsy right      Removal of ovary(s)      2011 Left   [4]   Social History  Socioeconomic History    Marital status:    Tobacco Use    Smoking status: Every Day     Current packs/day: 0.50     Average packs/day: 0.5 packs/day for 25.0 years (12.5 ttl pk-yrs)     Types: Cigarettes     Passive exposure: Current    Smokeless tobacco: Never    Tobacco comments:     40 year smoking history   Vaping Use    Vaping status: Former   Substance and Sexual Activity    Alcohol use: Yes     Alcohol/week: 2.0 standard drinks of alcohol     Types: 2 Standard drinks or equivalent per week     Comment: maybe one time a month    Drug use: No    Sexual activity: Yes     Partners: Male   Other Topics Concern    Caffeine Concern No    Reaction to local anesthetic No   Social History Narrative    Rupinder is . She has no children. Patient works as an . She lives alone in [place], [place].     Social Drivers of Health     Food Insecurity: No Food Insecurity (5/5/2025)    NCSS - Food Insecurity     Worried About Running Out of Food in the Last Year: No     Ran Out of Food in the Last Year: No   Transportation Needs: No Transportation Needs (5/5/2025)    NCSS - Transportation     Lack of Transportation: No   Housing Stability: Not At Risk (5/5/2025)    NCSS - Housing/Utilities     Has Housing: Yes     Worried About Losing Housing: No     Unable to Get Utilities: No   [5]   Family History  Problem Relation Age of Onset    Heart Disorder Mother 82    Other (diverticulitis) Mother         partial colectomy    Prostate Cancer Father 65    Cancer Self 24        lymphoma    Glaucoma Neg     Macular degeneration Neg     Diabetes  Neg     Bleeding Disorders Neg     DVT/VTE Neg    [6]   Allergies  Allergen Reactions    Entex JITTERY    Morphine OTHER (SEE COMMENTS)     Hyper and aggressive.

## 2025-05-07 LAB
M TB IFN-G CD4+ T-CELLS BLD-ACNC: 0.03 IU/ML
M TB TUBERC IFN-G BLD QL: NEGATIVE
M TB TUBERC IGNF/MITOGEN IGNF CONTROL: >10 IU/ML
QFT TB1 AG MINUS NIL: 0.01 IU/ML
QFT TB2 AG MINUS NIL: 0.02 IU/ML

## 2025-05-09 ENCOUNTER — HOSPITAL ENCOUNTER (OUTPATIENT)
Dept: CT IMAGING | Facility: HOSPITAL | Age: 55
Discharge: HOME | End: 2025-05-09
Attending: NURSE PRACTITIONER
Payer: COMMERCIAL

## 2025-05-09 DIAGNOSIS — R04.2 HEMOPTYSIS: ICD-10-CM

## 2025-05-09 PROCEDURE — 71250 CT THORAX DX C-: CPT | Performed by: NURSE PRACTITIONER

## 2025-06-10 ENCOUNTER — OFFICE VISIT (OUTPATIENT)
Dept: PULMONOLOGY | Facility: CLINIC | Age: 55
End: 2025-06-10
Payer: COMMERCIAL

## 2025-06-10 VITALS
HEART RATE: 87 BPM | SYSTOLIC BLOOD PRESSURE: 140 MMHG | HEIGHT: 66.5 IN | DIASTOLIC BLOOD PRESSURE: 83 MMHG | BODY MASS INDEX: 31.79 KG/M2 | OXYGEN SATURATION: 97 % | WEIGHT: 200.19 LBS | RESPIRATION RATE: 12 BRPM

## 2025-06-10 DIAGNOSIS — R03.0 ELEVATED BLOOD PRESSURE READING IN OFFICE WITHOUT DIAGNOSIS OF HYPERTENSION: ICD-10-CM

## 2025-06-10 DIAGNOSIS — R06.09 DYSPNEA ON EXERTION: Primary | ICD-10-CM

## 2025-06-10 DIAGNOSIS — R06.2 WHEEZING: ICD-10-CM

## 2025-06-10 DIAGNOSIS — J32.9 RECURRENT SINUSITIS: ICD-10-CM

## 2025-06-10 DIAGNOSIS — Z71.6 ENCOUNTER FOR TOBACCO USE CESSATION COUNSELING: ICD-10-CM

## 2025-06-10 DIAGNOSIS — R91.8 PULMONARY NODULES: ICD-10-CM

## 2025-06-10 DIAGNOSIS — R05.3 CHRONIC COUGH: ICD-10-CM

## 2025-06-10 PROCEDURE — 99406 BEHAV CHNG SMOKING 3-10 MIN: CPT | Performed by: PHYSICIAN ASSISTANT

## 2025-06-10 PROCEDURE — 3079F DIAST BP 80-89 MM HG: CPT | Performed by: PHYSICIAN ASSISTANT

## 2025-06-10 PROCEDURE — 3008F BODY MASS INDEX DOCD: CPT | Performed by: PHYSICIAN ASSISTANT

## 2025-06-10 PROCEDURE — 3077F SYST BP >= 140 MM HG: CPT | Performed by: PHYSICIAN ASSISTANT

## 2025-06-10 PROCEDURE — 99204 OFFICE O/P NEW MOD 45 MIN: CPT | Performed by: PHYSICIAN ASSISTANT

## 2025-06-10 RX ORDER — ALBUTEROL SULFATE 90 UG/1
INHALANT RESPIRATORY (INHALATION) EVERY 6 HOURS PRN
COMMUNITY

## 2025-06-10 NOTE — PATIENT INSTRUCTIONS
Call to schedule pulmonary function testing - [phone].   You will be due to CT scan of the chest in May 2026 to ensure pulmonary nodules are stable.  Recommend you see ENT physician for recurrent sinusitis.  Work on quitting smoking. Let me know if there is anything I can do to help you with this or if you change your mind on medications.    Quitting smoking is one of the most important things you can do for your health. The sooner you quit smoking, the greater the benefits. It is never too late to quit smoking.    Strategies for quitting smoking:  Set a quit date. If you have difficulty with \"all-or-nothing\"/cold turkey quitting, you can gradually cut down in anticipation of quit date further in the future.  Tell family, friends, and co-workers about your plan to quit and request support. Request individuals who also smoke to not smoke around you.  Remove tobacco and vaping products from your environment.  Total abstinence is essential. Not even a single puff after the quit date is important.  Anticipate potential withdrawal symptoms so you can have a plan in place to address symptoms if needed. Symptoms can include: cravings, depressed mood, anxiety, irritability/anger, restlessness, sleep disturbances, difficulty concentrating, and increased appetite.    Resources:  [place] Quit Line  Call 1-866-QUIT-Yes  http://quityes.org/    National [employer] [place] - many good resources  6-276-94W-QUIT  http://smokefree.gov/    SmokefreeTXT - get text reminders and tips and encouragment  http://smokefree.gov/smokefreetxt    eKn Cruz's Easy Way to Stop Smoking - book    Risks of smoking:  -Doubles a person's risk of developing coronary artery disease, a condition that can lead to heart attack. One year after stopping smoking, the risk of dying from coronary artery disease is reduced by approximately one-half and continues to decline over time.  -Increases risk of stroke.  -Increases risk of lung disease including  chronic obstructive pulmonary disease. Much of the lung damage that occurs from smoking is irreversible but quitting smoking can reduce further lung damage.  -Cigarette smoking is responsible for nearly 90% of cases of lung cancer.  -Increases risk of other types of cancer, including cancers of the head and neck, esophagus, pancreas, and bladder.  -Increases risk of osteoporosis.  -Increases risk of peptic ulcer disease.  -Contributes to erectile dysfunction.  -Secondhand smoke exposure increases risk of lung cancer, coronary artery disease, and stroke.    Recommend nicotine replacement therapy. You can try nicotine lozenge or gum for cravings. Recommend finding something to assist with hand to mouth sensation. For example, you could try sugar free suckers or lollipops.   No

## 2025-06-10 NOTE — PROGRESS NOTES
Pulmonary Consult Note    History of Present Illness:  Rupinder Trujillo is a 54 year old female presenting to pulmonary clinic today for dyspnea, cough, chest tightness, and wheezing, referred by Paz WADE. She is known patient to Dr. Bajwa, last seen in 2021 for pulmonary nodules.    Symptoms started in February 2025. She has history of recurrent sinusitis and states symptoms were initially similar to prior episodes of sinusitis with sinus tenderness, congestion, and postnasal drainage. Symptoms then progressed to dyspnea, wheezing, chest tightness, and one episode of hemoptysis. She coughed up about dime size amount of blood 1 month ago. This has not recurred. No fever, chills, change in appetite, or weight loss. She has hot flashes and attributes this to menopause. She was seen in ER, IC, and PCP office multiple times for these symptoms. Chest x-ray and subsequent CT chest were unremarkable. She received doxycycline, two courses of Augmentin, and medrol dosepak. The steroids helped initially but then symptoms recurred. She uses albuterol rarely for chest tightness which helps. She has dyspnea on exertion with stairs and this is new x 4 months. She currently has cough with clear sputum x 4 months. She still has postnasal drainage and sinus congestion. No shortness of breath at rest. No known history of lung disease or VTE. She has history of pulmonary nodules which have remained stable on serial CT scans of the chest. She smoked 0.5 ppd. She quit smoking for 6 years and then resumed in 2022 when her  got sick. She quit smoking in the past by vaping nicotine. She no longer vapes.     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 No weight loss or weight gain.  HEENT: +Nasal congestion and postnasal drainage. No vision changes.   Cardio: No chest pain or palpitations.  Respiratory: See HPI.  GI: +Acid reflux (controlled with diet).  Extremities: No lower extremity swelling or pain.   Neurologic: No headache.  Skin: No rash.  Psych: +Anxiety and depression.     Physical Exam:  /83   Pulse 87   Resp 12   Ht 5' 6.5\" (1.689 m)   Wt 200 lb 3.2 oz (90.8 kg)   LMP 01/22/2017 (LMP Unknown)   SpO2 97%   BMI 31.83 kg/m²    Constitutional: Obese. No acute distress.  HEENT: Head NC/AT. PEERL. Turbinate hypertrophy. Deviated septum. No tonsillar or uvula enlargement. Mallampati class 1.  Cardio: Regular rate and rhythm. Normal S1 and S2. No murmurs.   Respiratory: Thorax symmetrical with no labored breathing. Clear to ausculation bilaterally with symmetrical breath sounds. No wheezing, rhonchi, or crackles.   Extremities: No clubbing or cyanosis. No LE edema. No calf tenderness.  Neurologic: A&Ox3. No gross motor deficits.  Skin: Warm, dry.  Lymphatic: No cervical or supraclavicular lymphadenopathy.  Psych: Calm, cooperative. Pleasant affect.    Results:  -CT Chest 5/2025: Unremarkable    -CT Chest 3/2025:   1. Stable pulmonary nodules and micronodules. A follow-up study can be obtained in 1 year to document a 2-year pattern of stability for the left lower lobe pulmonary nodule.    2. Left adrenal adenoma is unchanged.    3. Hepatic steatosis.   4. Status post cholecystectomy.    5. Sequela of remote granulomatous disease.   6. Lesser incidental findings as above.     Assessment/Plan:  Dyspnea, wheezing, chest tightness  Current smoker. Improves with albuterol. Will evaluate for obstructive lung disease. She had 1 day of small amount of hemoptysis and this is resolved x 1 month. Possibly related to sinusitis or bronchitis.  Plan:  -PFTs  -Albuterol MDI PRN  -Call ASAP if hemoptysis recurs    Chronic cough  Postnasal drainage contributing. Has recurrent sinusitis. May have obstructive lung disease.  Plan:  -PFTs  -Referral to ENT    Tobacco use  Current smoker, 0.5 ppd with 15 pack-year history. Tobacco cessation counseling provided. Recommend nicotine replacement therapy. She is not interested in nicotine patch but agreeable to trying gum/lozenge for cravings. We discussed hand to mouth sensation and strategies to help with this.  Plan:  -Encourage tobacco cessation  -Nicotine replacement therapy    Pulmonary nodules  Stable.  Plan:  -12 month f/u CT chest (due 5/2026)    Elevated BP  Plan:  -Recheck BP now    Jensen Gomez PA-C  Pulmonary Medicine  6/10/2025

## 2025-06-11 ENCOUNTER — TELEPHONE (OUTPATIENT)
Dept: CASE MANAGEMENT | Age: 55
End: 2025-06-11

## 2025-06-11 DIAGNOSIS — R09.81 SINUS CONGESTION: Primary | ICD-10-CM

## 2025-06-11 DIAGNOSIS — J34.2 DEVIATED SEPTUM: ICD-10-CM

## 2025-06-11 NOTE — TELEPHONE ENCOUNTER
Jovanna Calvillo,     Patient requesting referral to an ENT for deviated septum and sinus issues.     Please provide the name of the specialist you recommend and the DX on referral.     Pended referral please review diagnosis and sign off if you agree.    Thank you.  Norma Escobedo  [facility]

## 2025-06-17 ENCOUNTER — APPOINTMENT (OUTPATIENT)
Dept: GENERAL RADIOLOGY | Age: 55
End: 2025-06-17
Attending: PHYSICIAN ASSISTANT
Payer: COMMERCIAL

## 2025-06-17 ENCOUNTER — HOSPITAL ENCOUNTER (OUTPATIENT)
Age: 55
Discharge: HOME | End: 2025-06-17
Payer: COMMERCIAL

## 2025-06-17 VITALS
DIASTOLIC BLOOD PRESSURE: 89 MMHG | SYSTOLIC BLOOD PRESSURE: 164 MMHG | HEART RATE: 100 BPM | TEMPERATURE: 98 F | RESPIRATION RATE: 18 BRPM | OXYGEN SATURATION: 96 %

## 2025-06-17 DIAGNOSIS — M54.50 LOW BACK PAIN AT MULTIPLE SITES: Primary | ICD-10-CM

## 2025-06-17 PROCEDURE — 99214 OFFICE O/P EST MOD 30 MIN: CPT | Performed by: PHYSICIAN ASSISTANT

## 2025-06-17 PROCEDURE — 72100 X-RAY EXAM L-S SPINE 2/3 VWS: CPT | Performed by: PHYSICIAN ASSISTANT

## 2025-06-17 RX ORDER — CYCLOBENZAPRINE HCL 10 MG
10 TABLET ORAL 3 TIMES DAILY PRN
Qty: 20 TABLET | Refills: 0 | Status: SHIPPED | OUTPATIENT
Start: 2025-06-17 | End: 2025-06-24

## 2025-06-17 RX ORDER — IBUPROFEN 600 MG/1
600 TABLET, FILM COATED ORAL EVERY 8 HOURS PRN
Qty: 30 TABLET | Refills: 0 | Status: SHIPPED | OUTPATIENT
Start: 2025-06-17 | End: 2025-06-24

## 2025-06-17 NOTE — ED PROVIDER NOTES
Patient Seen in: [facility]        History  Chief Complaint   Patient presents with    Pain     Stated Complaint: Back Pain - My head hurts and my lower back is shooting pair everywhere    Subjective:   HPI            Patient is a 54-year-old female who presents to immediate care due to lower back pain over the past 4 days.  Patient states over the weekend she was lifting heavy boxes and lifting them over her head.  States the day after she had mild soreness of her lower back.  States over the past few days she has had increased pain radiating up to her neck and down her legs bilaterally.  Denies bowel or bladder incontinence, saddle anesthesia, dysuria hematuria abdominal pain.  Has been taking ibuprofen with moderate relief.      Objective:     No pertinent past medical history.            No pertinent past surgical history.              No pertinent social history.            Review of Systems    Positive for stated complaint: Back Pain - My head hurts and my lower back is shooting pair everywhere  Other systems are as noted in HPI.  Constitutional and vital signs reviewed.      All other systems reviewed and negative except as noted above.                  Physical Exam    ED Triage Vitals [06/17/25 1546]   BP (!) 164/89   Pulse 100   Resp 18   Temp 98.4 °F (36.9 °C)   Temp src Oral   SpO2 96 %   O2 Device None (Room air)       Current Vitals:   Vital Signs  BP: (!) 164/89  Pulse: 100  Resp: 18  Temp: 98.4 °F (36.9 °C)  Temp src: Oral    Oxygen Therapy  SpO2: 96 %  O2 Device: None (Room air)            Physical Exam  Vital signs reviewed. Nursing note reviewed.  Constitutional: Well-developed. Well-nourished. Lying in bed, in no acute distress  HENT: Mucous membranes moist.   EYES: No scleral icterus or conjunctival injection.  NECK: Full ROM. Supple.   CARDIAC: Normal rate. Normal S1/ S2. No murmurs. 2+ distal pulses. No edema  PULM/CHEST: Clear to auscultation bilaterally. No wheezes  ABD: Soft,  non-tender, non-distended. +BS. No guarding, no rebound.  BACK: No T or L spine tenderness  RECTAL: deferred  Extremities: Full ROM  NEURO: Awake, alert, 5/5 strength in hip flexors, knee flexion/extension, plantar/dorsiflexion bilaterally. Equal sensation in both legs. No saddle anesthesia  SKIN: Warm and dry. No rash or lesions.  PSYCH: Normal judgment. Normal affect.                    MDM     Patient is a 54-year-old female that presents to immediate care due to low back pain x 4 days.  Patient arrives with stable vitals sitting comfortably in no acute distress.  Physical exam showing point tenderness to palpation of b/l lumbar paraspinal muscles.  Most likely musculoskeletal pain, acute on chronic lower back pain, lower back strain.  lumbar x-ray images ordered, personally reviewed by me showing no compression fracture.  Less likely cord compression or cauda equina with no concerning presenting symptoms and reassuring physical exam.  Unlikely epidural abscess with no infectious symptoms.  Will treat with flexeril and ibuprofen.   Return precautions discussed including worsening pain, lower extremity weakness, paresthesias, bowel or bladder incontinence or fever.  Patient expressed understanding all questions answered.  History given by patient.          Medical Decision Making      Disposition and Plan     Clinical Impression:  1. Low back pain at multiple sites         Disposition:  Discharge  6/17/2025  4:41 pm    Follow-up:  Tyrone Benz  [address] [place] [address]  [phone]    Schedule an appointment as soon as possible for a visit             Medications Prescribed:  Discharge Medication List as of 6/17/2025  4:44 PM        START taking these medications    Details   ibuprofen 600 MG Oral Tab Take 1 tablet (600 mg total) by mouth every 8 (eight) hours as needed for Pain or Fever., Normal, Disp-30 tablet, R-0      cyclobenzaprine 10 MG Oral Tab Take 1 tablet (10 mg total) by mouth 3 (three)  times daily as needed for Muscle spasms., Normal, Disp-20 tablet, R-0                   Supplementary Documentation:

## 2025-06-17 NOTE — DISCHARGE INSTRUCTIONS
Today you had reassuring x-rays of your lower back.  Please follow-up with your primary care doctor if symptoms persist.  You may take Tylenol ibuprofen and Flexeril for pain.  You may take 1000 mg of Tylenol every 6 hours for pain.  Please go to ER if:  1. Loss of stool or urine  2. Groin numbness  3. Fever  4. Leg weakness/falls  5. Any other concerns

## 2025-06-17 NOTE — ED INITIAL ASSESSMENT (HPI)
Pt c/o lower back pain that radiates to her neck and to different areas of her body x 3 days denies injury, denies light sensitivity denies fever

## 2025-08-14 ENCOUNTER — OFFICE VISIT (OUTPATIENT)
Dept: INTERNAL MEDICINE CLINIC | Facility: CLINIC | Age: 55
End: 2025-08-14

## 2025-08-14 ENCOUNTER — NURSE TRIAGE (OUTPATIENT)
Dept: INTERNAL MEDICINE CLINIC | Facility: CLINIC | Age: 55
End: 2025-08-14

## 2025-08-14 VITALS
HEART RATE: 92 BPM | OXYGEN SATURATION: 98 % | HEIGHT: 66.5 IN | RESPIRATION RATE: 16 BRPM | TEMPERATURE: 99 F | WEIGHT: 195 LBS | SYSTOLIC BLOOD PRESSURE: 130 MMHG | BODY MASS INDEX: 30.97 KG/M2 | DIASTOLIC BLOOD PRESSURE: 80 MMHG

## 2025-08-14 DIAGNOSIS — N89.8 VAGINAL DISCHARGE: ICD-10-CM

## 2025-08-14 DIAGNOSIS — N95.0 POSTMENOPAUSAL BLEEDING: Primary | ICD-10-CM

## 2025-08-14 PROCEDURE — 3079F DIAST BP 80-89 MM HG: CPT | Performed by: INTERNAL MEDICINE

## 2025-08-14 PROCEDURE — 3008F BODY MASS INDEX DOCD: CPT | Performed by: INTERNAL MEDICINE

## 2025-08-14 PROCEDURE — 3075F SYST BP GE 130 - 139MM HG: CPT | Performed by: INTERNAL MEDICINE

## 2025-08-14 PROCEDURE — 99214 OFFICE O/P EST MOD 30 MIN: CPT | Performed by: INTERNAL MEDICINE

## 2025-08-15 ENCOUNTER — HOSPITAL ENCOUNTER (OUTPATIENT)
Dept: ULTRASOUND IMAGING | Facility: HOSPITAL | Age: 55
Discharge: HOME | End: 2025-08-15
Attending: INTERNAL MEDICINE

## 2025-08-15 DIAGNOSIS — N95.0 POSTMENOPAUSAL BLEEDING: ICD-10-CM

## 2025-08-15 LAB
BV BACTERIA DNA VAG QL NAA+PROBE: POSITIVE
C GLABRATA DNA VAG QL NAA+PROBE: NEGATIVE
C KRUSEI DNA VAG QL NAA+PROBE: NEGATIVE
CANDIDA DNA VAG QL NAA+PROBE: NEGATIVE
T VAGINALIS DNA VAG QL NAA+PROBE: NEGATIVE

## 2025-08-15 PROCEDURE — 76856 US EXAM PELVIC COMPLETE: CPT | Performed by: INTERNAL MEDICINE

## 2025-08-15 PROCEDURE — 76830 TRANSVAGINAL US NON-OB: CPT | Performed by: INTERNAL MEDICINE

## (undated) DEVICE — CONMED SCOPE SAVER BITE BLOCK, 20X27 MM: Brand: SCOPE SAVER

## (undated) DEVICE — UNDYED BRAIDED (POLYGLACTIN 910), SYNTHETIC ABSORBABLE SUTURE: Brand: COATED VICRYL

## (undated) DEVICE — TISSUE RETRIEVAL SYSTEM: Brand: INZII RETRIEVAL SYSTEM

## (undated) DEVICE — YANKAUER,BULB TIP,W/O VENT,RIGID,STERILE: Brand: MEDLINE

## (undated) DEVICE — MEDI-VAC NON-CONDUCTIVE SUCTION TUBING: Brand: CARDINAL HEALTH

## (undated) DEVICE — 60 ML SYRINGE REGULAR TIP: Brand: MONOJECT

## (undated) DEVICE — KIT CLEAN ENDOKIT 1.1OZ GOWNX2

## (undated) DEVICE — GIJAW SINGLE-USE BIOPSY FORCEPS WITH NEEDLE: Brand: GIJAW

## (undated) DEVICE — LAP CHOLE: Brand: MEDLINE INDUSTRIES, INC.

## (undated) DEVICE — GAMMEX® PI HYBRID SIZE 7.5, STERILE POWDER-FREE SURGICAL GLOVE, POLYISOPRENE AND NEOPRENE BLEND: Brand: GAMMEX

## (undated) DEVICE — CLOSURE EXOFIN 1.0ML

## (undated) DEVICE — TROCAR: Brand: KII® SLEEVE

## (undated) DEVICE — WATER STERILE AQUALITE 1000ML

## (undated) DEVICE — ABDOMINAL BINDER: Brand: DEROYAL

## (undated) DEVICE — KIT ENDO ORCAPOD 160/180/190

## (undated) DEVICE — SOL  .9 1000ML BAG

## (undated) DEVICE — [HIGH FLOW INSUFFLATOR,  DO NOT USE IF PACKAGE IS DAMAGED,  KEEP DRY,  KEEP AWAY FROM SUNLIGHT,  PROTECT FROM HEAT AND RADIOACTIVE SOURCES.]: Brand: PNEUMOSURE

## (undated) DEVICE — TROCAR: Brand: KII FIOS FIRST ENTRY

## (undated) DEVICE — Device

## (undated) DEVICE — SOLUTION  .9 1000ML BTL

## (undated) DEVICE — SUT VICRYL 0 UR-6 J603H

## (undated) DEVICE — LIGAMAX 5 MM ENDOSCOPIC MULTIPLE CLIP APPLIER: Brand: LIGAMAX

## (undated) DEVICE — TROCARS: Brand: KII® BALLOON BLUNT TIP SYSTEM

## (undated) DEVICE — MEGADYNE E-Z CLEAN BLADE 2.75"

## (undated) DEVICE — MEDI-VAC NON-CONDUCTIVE SUCTION TUBING 6MM X 1.8M (6FT.) L: Brand: CARDINAL HEALTH

## (undated) DEVICE — V2 SPECIMEN COLLECTION MANIFOLD KIT: Brand: NEPTUNE

## (undated) NOTE — ED AVS SNAPSHOT
[facility] in [facility].  [address] [facility]    Phone:  [phone]    Fax:  Jose Gonsales   MRN: [id number]    Department:  [facility] in [address] [place] [address]   Date of Visi Disclosure     Insurance plans vary and the physician(s) referred by the Immediate Care may not be covered by your plan. It is possible that the physician may not participate in your health insurance plan.   This may result in a lower benefit level being a CARE PHYSICIAN AT ONCE OR GO TO THE EMERGENCY DEPARTMENT. If you have been prescribed any medication(s), please fill your prescription right away and begin taking the medication(s) as directed.   If you believe that any of the medications or instructions - If you have concerns related to behavioral health issues or thoughts of harming yourself, contact [address] [place] [address] at [phone].     - If you don’t have insurance, Lilibeth Zelaya has partnered with Patient [facility]

## (undated) NOTE — LETTER
12/30/20        [address] [address]      Dear [facility],    Our records indicate that you have outstanding lab work and or testing that was ordered for you and has not yet been completed:  Orders Placed This Encounter

## (undated) NOTE — LETTER
[address], [place]  Authorization for Invasive Procedures  1. I hereby authorize Dr. Juan Manuel Glasgow, my physician and whomever may be designated as the doctor's assistant, to perform the following operation and/or procedure:  Laparoscopic cholecystectomy on Gurwinder Tru at [facility].    2. My physician has explained to me the nature and purpose of the operation or other procedure, possible alternative methods of treatment, the risks involved and the possibility of complications to me. I understand the probable consequences of declining the recommended procedure and the alternative methods of treatment. I acknowledge that no guarantee has been made as to the result that may be obtained. 3. I recognize that during the course of this operation or other procedure, unforeseen conditions may necessitate additional or different procedures than those listed above. I, therefore, further authorize and request that the above-named physician, his/her physician assistants, or designees perform such procedures as are, in his/her professional opinion, necessary and desirable. If I have a Do Not Attempt Resuscitation (DNAR) order in place, that status will be suspended while in the operating room, procedural suite, and during the recovery period unless otherwise explicitly stated by me (or a person authorized to consent on my behalf). The surgeon or my attending physician will determine when the applicable recovery period ends for purposes of reinstating the DNAR order. 4. Should the need arise during my operation or immediate post-operative period; I also consent to the administration of blood and/or blood products.  Further, I understand that despite careful testing and screening of blood and blood products, I may still be subject to ill effects as a result of recieving a blood transfusion an/or blood producst. The following are some, but not all, of the potential risks that can occur: fever and allergic reactions, hemolytic reactions, transmission of disease such as hepatitis, AIDS, cytomegalovirus (CMV), and flluid overload. In the event that I wish to have autologous transfusions of my own blood, or a directed donor transfusion, I will discuss this with my physician. 5. I consent to the photographing of the operations or procedures to be performed for the purposes of advancing medicine, science, and/or education, provided my identity is not revealed. If the procedure has been videotaped, the physician/surgeon will obtain the original videotape. The [facility] will not be responsible for storage or maintenance of this tape. 6. I consent to the presence of a  or observer as deemed necessary by my physician or his designee. 7. Any tissues or organs removed in the operation or other procedure may be disposed of by and at the discretion of [facility].    8. I understand that the physician and his/her physician assistants may not be employees or agents of [facility], [facility], nor [address] [address], but are independent medical practitioners who have been permitted to use its facilities for the care and treatment of their patients. 9. Patients having a sterilization procedure: I understand that if the procedure is successful the results will be permanent and it will therefore be impossible for me to inseminate, conceive or bear children. I also understand that the procedure is intended to result in sterility, although the result has not been guaranteed. 10. I CERTIFY THAT I HAVE READ AND FULLY UNDERSTAND THE ABOVE CONSENT TO OPERATION and/or OTHER PROCEDURE. 11. I acknowledge that my physician has explained sedation/analgesia administration to me including the risks and benefits. I consent to the administration of sedation/analgesia as may be necessary or desirable in the judgment of my physician. Signature of Patient:  ________________________________________________ Date: _________Time: _________    Responsible person in case of minor or unconscious: _____________________________Relationship: ____________     Witness Signature: ____________________________________________ Date: __________ Time: ___________    Statement of Physician  My signature below affirms that prior to the time of the procedure, I have explained to the patient and/or her legal representative, the risks and benefits involved in the proposed treatment and any reasonable alternative to the proposed treatment. I have also explained the risks and benefits involved in the refusal of the proposed treatment and have answered the patient's questions. If I have a significant financial interest in this procedure/surgery, I have disclosed this and had a discussion with my patient.     Signature of Physician:   ________________________________________Date: _________Time:_______ Patient Name: Baylee Nazario  : 1970   Printed: 2022    Medical Record #: [id number]

## (undated) NOTE — LETTER
SEBASTIAN ANDRE  [address] [place] [address]      4/2/2018    Dear Zonia Vitale,    It has come to our attention that you are due for: CT in April. This test was ordered by Dr. Aquilino Cerda. Please call for an appointment at [phone].     If

## (undated) NOTE — LETTER
12/04/19        [address] [address]      Dear Matty Real,    Our records indicate that you have outstanding lab work and or testing that was ordered for you and has not yet been completed:    H. Pylori, Stool    To provide y

## (undated) NOTE — LETTER
Date & Time: 5/10/2018, 7:22 PM  Patient: Jaclyn Lennon  Encounter Provider(s):    Akhil Restrepo MD       To Whom It May Concern:    Simone Madden was seen and treated in our department on 5/10/2018.  She should not return to work until 05/14/2018

## (undated) NOTE — LETTER
12/26/17        [address] [address]      Dear Jenna Hardin,    Our records indicate that you have outstanding lab work and or testing that was ordered for you and has not yet been completed:          Vitamin B12 [E]  To provid

## (undated) NOTE — ED AVS SNAPSHOT
[facility] in [facility].  [address] [facility]    Phone:  [phone]    Fax:  Jose Gonsales   MRN: [id number]    Department:  [facility] in [address] [place] [address]   Date of Visi under your health insurance plan. Please contact your insurance company and physician's office to determine coverage and benefits available for follow-up care and referrals.      It is our goal to assure that you are completely satisfied with every aspect doctor until you can check with your doctor. Please bring the medication list to your next doctor's appointment. Any imaging studies and labs completed today can be reviewed in your [employer] account.   You may have had testing done that requires us to co Medicaid plans. To get signed up and covered, please call [phone] and ask to get set up for an insurance coverage that is in-network with [facility]. Fer     Sign up for [employer], your secure online medical record.   [employer]

## (undated) NOTE — LETTER
[place] [address]    Consent for Diagnostic Services    Your physician has ordered one of the following tests to determine the function of your heart:  Nuclear stress test . The information obtained will aid your physician in detecting the possible presence of heart disease, to determine why you may have such symptoms such as chest pain or shortness of breath and to determine an appropriate plan of medical management.    The risks associated with any exercise test or pharmacological stress test are similar to the risks associated with exercise, including an abnormal blood pressure, dizziness, fainting, abnormal heart rate and in very rear instances heart attach, stroke, or death. During the test you must report any unusual feeling or symptoms you experience during the test. Every effort will be made to minimize such risks by careful observation during the test. Emergency equipment and trained personnel are available to deal with unusual situations, which may arise and these personnel have permission to treat you.     During the treadmill or nuclear stress test, the exercise intensity begins at a low level and advances in stages depending on your fitness level. We may stop the test at any time because of signs of fatigue or changes in your heart rate, electrocardiogram, or blood pressure, or other symptoms you may experience.     If your doctor has ordered a pharmacological stress test, you may experience a headache, shortness of breath, flushing, warmth, rapid heart rate, or a bitter taste in your mouth. Sometimes you may not experience any symptoms. Your prompt reporting of any symptoms is very important.     During a Regadenoson stress test, you are given an injection of Regadenoson. Immediately after the Regadenoson is given, you will be injected with a radioactive isotope. During a Dobutamine stress test, Dobutamine infuses over approximately fifteen minutes. A radioactive isotope is  injected during the infusion. After either pharmacological stress test, you will have either a nuclear scan or an echocardiogram.    The information that is obtained during your testing will be treated as privileged and confidential. The information obtained will be given to your doctor and may be used for insurance purposes or to track and trend data as part of  with your privacy retained.    I have read and understand the above information. I voluntarily agree and consent to the above ordered test. Furthermore, no guarantees or assurances have been given by anyone as to the results that may be obtained from this procedure. Any questions that may have occurred to me have been answered to my satisfaction.     Signature of Patient:   ____________________________________________________________    Signature of Witness: _______________________________Date: ___________Time: ________

## (undated) NOTE — LETTER
July 19, 2018    MD [address]ERIA [address] [address]     Patient: Farrah Ware   YOB: 1970   Date of Visit: 7/19/2018       Dear Dr. Sigrid Simpson MD:    Thank you for referring Darien Zafar to me for evaluati ROS:     ROS     Positive for: Eyes    Negative for: Constitutional, Gastrointestinal, Neurological, Skin, Genitourinary, Musculoskeletal, HENT, Endocrine, Cardiovascular, Respiratory, Psychiatric, Allergic/Imm, Heme/Lymph    Last edited by Irma Carter on to following Rupinder along with you.     Sincerely,        Mary Rocha MD        CC: No Recipients    Document electronically generated by: Mary Rocha

## (undated) NOTE — ED AVS SNAPSHOT
Logan Clifton   MRN: [id number]    Department:  [facility]   Date of Visit:  7/18/2018           Disclosure     Insurance plans vary and the physician(s) referred by the ER may not be covered by your plan.  Please conta CARE PHYSICIAN AT ONCE OR RETURN IMMEDIATELY TO THE EMERGENCY DEPARTMENT. If you have been prescribed any medication(s), please fill your prescription right away and begin taking the medication(s) as directed.   If you believe that any of the medications

## (undated) NOTE — MR AVS SNAPSHOT
[facility] SPECIALTY [facility] [place] [address] [address] [id number] [phone]               Thank you for choosing us for your health care visit with Gómez Lilly MD.  We are glad to serve you and happy to provide you with this summary of Assoc Dx:  Routine general medical examination at a health care facility [Z00.00]           Vitamin B12 [E]    Complete by:  Jun 20, 2017 (Approximate)    Assoc Dx:  Routine general medical examination at a health care facility [Z00.00]           EKG 12 L Facility, call Central Scheduling at [phone], Monday through Friday between 7:30am to 6pm and on Saturday between 8am and 1pm. Evening and weekend appointments for your exam are available.      [facility]  D authorization, such as [place], please feel free to schedule your appointment immediately. However, if you are unsure about the requirements for authorization, please wait 5-7 days and then contact your physician's   office.  At that time, you raz not sign up before the expiration date, you must request a new code. Your unique [employer] Access Code: [email or web address]  Expires: 7/15/2017  2:39 PM    If you have questions, you can call [phone] to talk to our [facility] Staff.  Remember, [employer]

## (undated) NOTE — LETTER
[address]  [facility] [address]  Dept: [phone]  Dept Fax: [phone]  Loc: [phone]      May 16, 2017    Patient: Gera Brantley   Date of Visit: 5/16/2017       To Whom It May Concern:    [place]

## (undated) NOTE — Clinical Note
2/20/2017    Dear Kalia Ennis,    It has come to our attention that the enclosed required test is due in March. This test was ordered by Dr. Lilton Simmonds. Please call for an appointment at [phone].     If you are allergic to iodine or have any qu

## (undated) NOTE — LETTER
[facility] [address], [place], [place]    Authorization for Surgical Operation and Procedure                               I hereby authorize Marisabel Miller MD, my physician and his/her assistants (if applicable), which may include medical students, residents, and/or fellows, to perform the following surgical operation/ procedure and administer such anesthesia as may be determined necessary by my physician: Operation/Procedure name (s) ESOPHAGOGASTRODUODENOSCOPY on Rupinder Trujillo   2.   I recognize that during the surgical operation/procedure, unforeseen conditions may necessitate additional or different procedures than those listed above.  I, therefore, further authorize and request that the above-named surgeon, assistants, or designees perform such procedures as are, in their judgment, necessary and desirable.    3.   My surgeon/physician has discussed prior to my surgery the potential benefits, risks and side effects of this procedure; the likelihood of achieving goals; and potential problems that might occur during recuperation.  They also discussed reasonable alternatives to the procedure, including risks, benefits, and side effects related to the alternatives and risks related to not receiving this procedure.  I have had all my questions answered and I acknowledge that no guarantee has been made as to the result that may be obtained.    4.   Should the need arise during my operation/procedure, which includes change of level of care prior to discharge, I also consent to the administration of blood and/or blood products.  Further, I understand that despite careful testing and screening of blood or blood products by collecting agencies, I may still be subject to ill effects as a result of receiving a blood transfusion and/or blood products.  The following are some, but not all, of the potential risks that can occur: fever and allergic reactions, hemolytic reactions,  transmission of diseases such as Hepatitis, AIDS and Cytomegalovirus (CMV) and fluid overload.  In the event that I wish to have an autologous transfusion of my own blood, or a directed donor transfusion, I will discuss this with my physician.  Check only if Refusing Blood or Blood Products  I understand refusal of blood or blood products as deemed necessary by my physician may have serious consequences to my condition to include possible death. I hereby assume responsibility for my refusal and release the hospital, its personnel, and my physicians from any responsibility for the consequences of my refusal.    o  Refuse   5.   I authorize the use of any specimen, organs, tissues, body parts or foreign objects that may be removed from my body during the operation/procedure for diagnosis, research or teaching purposes and their subsequent disposal by hospital authorities.  I also authorize the release of specimen test results and/or written reports to my treating physician on the hospital medical staff or other referring or consulting physicians involved in my care, at the discretion of the Pathologist or my treating physician.    6.   I consent to the photographing or videotaping of the operations or procedures to be performed, including appropriate portions of my body for medical, scientific, or educational purposes, provided my identity is not revealed by the pictures or by descriptive texts accompanying them.  If the procedure has been photographed/videotaped, the surgeon will obtain the original picture, image, videotape or CD.  The hospital will not be responsible for storage, release or maintenance of the picture, image, tape or CD.    7.   I consent to the presence of a  or observers in the operating room as deemed necessary by my physician or their designees.    8.   I recognize that in the event my procedure results in extended X-Ray/fluoroscopy time, I may develop a skin reaction.    9. If  I have a Do Not Attempt Resuscitation (DNAR) order in place, that status will be suspended while in the operating room, procedural suite, and during the recovery period unless otherwise explicitly stated by me (or a person authorized to consent on my behalf). The surgeon or my attending physician will determine when the applicable recovery period ends for purposes of reinstating the DNAR order.  10. Patients having a sterilization procedure: I understand that if the procedure is successful the results will be permanent and it will therefore be impossible for me to inseminate, conceive, or bear children.  I also understand that the procedure is intended to result in sterility, although the result has not been guaranteed.   11. I acknowledge that my physician has explained sedation/analgesia administration to me including the risk and benefits I consent to the administration of sedation/analgesia as may be necessary or desirable in the judgment of my physician.    I CERTIFY THAT I HAVE READ AND FULLY UNDERSTAND THE ABOVE CONSENT TO OPERATION and/or OTHER PROCEDURE.     ____________________________________  _________________________________        ______________________________  Signature of Patient    Signature of Responsible Person                Printed Name of Responsible Person                                      ____________________________________  _____________________________                ________________________________  Signature of Witness        Date  Time         Relationship to Patient    STATEMENT OF PHYSICIAN My signature below affirms that prior to the time of the procedure; I have explained to the patient and/or his/her legal representative, the risks and benefits involved in the proposed treatment and any reasonable alternative to the proposed treatment. I have also explained the risks and benefits involved in refusal of the proposed treatment and alternatives to the proposed treatment and have  answered the patient's questions. If I have a significant financial interest in a co-management agreement or a significant financial interest in any product or implant, or other significant relationship used in this procedure/surgery, I have disclosed this and had a discussion with my patient.     _____________________________________________________              _____________________________  (Signature of Physician)                                                                                         (Date)                                   (Time)  Patient Name: Rupinder Truijllo      : 1970      Printed: 3/3/2025     Medical Record #: [id number]                                      Page 1 of 1

## (undated) NOTE — LETTER
05/15/20        [address] [address]      Dear Armando Dixon,    Our records indicate that you have outstanding lab work and or testing that was ordered for you and has not yet been completed:  Orders Placed This Encounter

## (undated) NOTE — LETTER
October 30, 2020     [address] [address]      Dear Kalia Ennis:    Below are the results from your recent visit:    hpv  negative        Resulted Orders   HPV HIGH RISK , THIN PREP COLLECTION   Result Value Ref Range    HPV

## (undated) NOTE — LETTER
[facility]  Administration of Anesthesia  Rupinder HENSON agree to be cared for by a physician anesthesiologist alone and/or with a nurse anesthetist, who is specially trained to monitor me and give me medicine to put me to sleep or keep me comfortable during my procedure    I understand that my anesthesiologist and/or anesthetist is not an employee or agent of [facility] or [employer]. He or she works for [facility]    As the patient asking for anesthesia services, I agree to:  Allow the anesthesiologist (anesthesia doctor) to give me medicine and do additional procedures as necessary. Some examples are: Starting or using an “IV” to give me medicine, fluids or blood during my procedure, and having a breathing tube placed to help me breathe when I’m asleep (intubation). In the event that my heart stops working properly, I understand that my anesthesiologist will make every effort to sustain my life, unless otherwise directed by [facility] Do Not Resuscitate documents.  Tell my anesthesia doctor before my procedure:  If I am pregnant.  The last time that I ate or drank.  iii. All of the medicines I take (including prescriptions, herbal supplements, and pills I can buy without a prescription (including street drugs/illegal medications). Failure to inform my anesthesiologist about these medicines may increase my risk of anesthetic complications.  iv.If I am allergic to anything or have had a reaction to anesthesia before.  I understand how the anesthesia medicine will help me (benefits).  I understand that with any type of anesthesia medicine there are risks:  The most common risks are: nausea, vomiting, sore throat, muscle soreness, damage to my eyes, mouth, or teeth (from breathing tube placement).  Rare risks include: remembering what happened during my procedure, allergic reactions to medications, injury to my airway, heart, lungs, vision, nerves, or  muscles and in extremely rare instances death.  My doctor has explained to me other choices available to me for my care (alternatives).  Pregnant Patients (“epidural”):  I understand that the risks of having an epidural (medicine given into my back to help control pain during labor), include itching, low blood pressure, difficulty urinating, headache or slowing of the baby’s heart. Very rare risks include infection, bleeding, seizure, irregular heart rhythms and nerve injury.  Regional Anesthesia (“spinal”, “epidural”, & “nerve blocks”):  I understand that rare but potential complications include headache, bleeding, infection, seizure, irregular heart rhythms, and nerve injury.    _____________________________________________________________________________  Patient (or Representative) Signature/Relationship to Patient  Date   Time    _____________________________________________________________________________   Name (if used)    Language/Organization   Time    _____________________________________________________________________________  Nurse Anesthetist Signature     Date   Time  _____________________________________________________________________________  Anesthesiologist Signature     Date   Time  I have discussed the procedure and information above with the patient (or patient’s representative) and answered their questions. The patient or their representative has agreed to have anesthesia services.    _____________________________________________________________________________  Witness        Date   Time  I have verified that the signature is that of the patient or patient’s representative, and that it was signed before the procedure  Patient Name: Rupinder Trujillo     : 1970                 Printed: 3/3/2025 at 11:12 AM    Medical Record #: [id number]                                            Page 1 of 1  ----------ANESTHESIA CONSENT----------

## (undated) NOTE — LETTER
May 17, 2018         Roman Heath MD  [address] [facility]      Patient: Supriya Clark   YOB: 1970   Date of Visit: 5/17/2018       Dear Dr. Sha Braga MD,    I saw your patient, Supriya Clark, on 5/17/2018

## (undated) NOTE — LETTER
10/21/19        [address] [address]      Dear Nataliia Skinner,    Our records indicate that you have outstanding lab work and or testing that was ordered for you and has not yet been completed:  Orders Placed This Encounter